# Patient Record
Sex: MALE | Race: WHITE | NOT HISPANIC OR LATINO | Employment: PART TIME | URBAN - METROPOLITAN AREA
[De-identification: names, ages, dates, MRNs, and addresses within clinical notes are randomized per-mention and may not be internally consistent; named-entity substitution may affect disease eponyms.]

---

## 2017-08-14 ENCOUNTER — OFFICE VISIT (OUTPATIENT)
Dept: FAMILY MEDICINE | Facility: CLINIC | Age: 62
End: 2017-08-14

## 2017-08-14 VITALS
DIASTOLIC BLOOD PRESSURE: 90 MMHG | SYSTOLIC BLOOD PRESSURE: 130 MMHG | TEMPERATURE: 98.2 F | HEART RATE: 90 BPM | BODY MASS INDEX: 27.79 KG/M2 | OXYGEN SATURATION: 97 % | WEIGHT: 182.8 LBS

## 2017-08-14 DIAGNOSIS — H92.22 BLOOD IN LEFT EAR CANAL: Primary | ICD-10-CM

## 2017-08-14 PROCEDURE — 99213 OFFICE O/P EST LOW 20 MIN: CPT | Performed by: PHYSICIAN ASSISTANT

## 2017-08-14 NOTE — PROGRESS NOTES
CC: Left ear symptoms    History:  Lennox is here today with complaints of left ear symptoms. This has been ongoing happening once every 1-2 months for the past 1-2 years. Symptoms resolve within 2 days. Woke up this morning and felt liquid in left ear. Used q-tips to get bloody substance out. Slight pain in left ear as well. History of surgery x 2 with inner implants as recently as 2 years ago.  No tinnitus, dizziness. Hearing still normal. Has not found for sure trigger, but did notice that when it happened back in March/April he was doing a lot of lifting.     Sees Dr. Garcia at [facility]. 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    Lennox feels well otherwise. No fevers, sweats, chills.    PMH, MEDICATIONS, ALLERGIES, SOCIAL AND FAMILY HISTORY in [facility] and reviewed by me personally.    ROS negative other than the symptoms noted above in the HPI.        Examination   /90 (BP Location: Right arm, Patient Position: Chair, Cuff Size: Adult Large)  Pulse 90  Temp 98.2  F (36.8  C) (Oral)  Wt 82.9 kg (182 lb 12.8 oz)  SpO2 97%  BMI 27.79 kg/m2       Constitutional: Sitting comfortably, in no acute distress. Vital signs noted  Eyes: pupils equal round reactive to light and accomodation, extra ocular movements intact  Ears: right: external canal and TM free of abnormalities left: TM appears normal for history of surgeries- no sign of infection. Blood pooled in mid external canal. No clear skin lesion or foreign body.  Cardiovascular:  regular rate and rhythm, no murmurs, clicks, or gallops  Respiratory:  normal respiratory rate and rhythm, lungs clear to auscultation  Neuro: hearing intact bilaterally.  Psychiatric: mentation appears normal and affect normal/bright      A/P    ICD-10-CM    1. Blood in left ear canal H92.22        DISCUSSION: Discussed this case with Dr. Crawford, who also examined Lennox's ear. Given his complicated history, and no  obvious abnormality of left TM other than the pooled blood in canal, we both recommended Lennox contact his ENT, Dr. Garcia. Given the cyclical nature, this is something that should be evaluated more thoroughly in case his TM is leaking. Decided not to do a flush today to better visualize blood pooling area, as it may damage possibly fragile TM/inner ear.    Asked Lennox to contact me if his insurance requires an ENT referral to be seen by Dr. Garcia. Should be okay thought since he is already a patient there. He should keep a journal of his symptoms and any potential triggers.    follow up visit: As needed    Maria Luisa Peralta PA-C  [facility]

## 2017-08-14 NOTE — NURSING NOTE
Lennox is here for ear pain and bleeding    Pre-Visit Screening :  Immunizations : up to date  Colon Screening : is up to date  Asthma Action Test/Plan : NA  PHQ9/GAD7 :  NA  Pulse - regular  My Chart - declines    CLASSIFICATION OF OVERWEIGHT AND OBESITY BY BMI                         Obesity Class           BMI(kg/m2)  Underweight                                    < 18.5  Normal                                         18.5-24.9  Overweight                                     25.0-29.9  OBESITY                     I                  30.0-34.9                              II                 35.0-39.9  EXTREME OBESITY             III                >40                             Patient's  BMI Body mass index is 27.79 kg/(m^2).  http://hin.nhlbi.nih.gov/menuplanner/menu.cgi  Questioned patient about current smoking habits.  Pt. has never smoked.

## 2017-08-14 NOTE — MR AVS SNAPSHOT
"              After Visit Summary   2017    Lennox Acuna    MRN: [id number]           Patient Information     Date Of Birth          1955        Visit Information        Provider Department      2017 4:00 PM Maria Luisa Peralta PA-C [facility]        Today's Diagnoses     Blood in left ear canal    -  1       Follow-ups after your visit        Follow-up notes from your care team     Return if symptoms worsen or fail to improve.      Who to contact     If you have questions or need follow up information about today's clinic visit or your schedule please contact [facility] directly at [phone].  Normal or non-critical lab and imaging results will be communicated to you by MyChart, letter or phone within 4 business days after the clinic has received the results. If you do not hear from us within 7 days, please contact the clinic through MyChart or phone. If you have a critical or abnormal lab result, we will notify you by phone as soon as possible.  Submit refill requests through [employer] or call your pharmacy and they will forward the refill request to us. Please allow 3 business days for your refill to be completed.          Additional Information About Your Visit        MyChart Information     [employer] lets you send messages to your doctor, view your test results, renew your prescriptions, schedule appointments and more. To sign up, go to [email or web address] . Click on \"Log in\" on the left side of the screen, which will take you to the Welcome page. Then click on \"Sign up Now\" on the right side of the page.     You will be asked to enter the access code listed below, as well as some personal information. Please follow the directions to create your username and password.     Your access code is: [email or web address]  Expires: 2017  5:03 PM     Your access code will  in 90 days. If you need help or a new code, please call your [place] " clinic or [phone].        Care EveryWhere ID     This is your Care EveryWhere ID. This could be used by other organizations to access your [place] medical records  [id number]        Your Vitals Were     Pulse Temperature Pulse Oximetry BMI (Body Mass Index)          90 98.2  F (36.8  C) (Oral) 97% 27.79 kg/m2         Blood Pressure from Last 3 Encounters:   08/14/17 130/90   10/02/16 152/82   09/21/15 136/74    Weight from Last 3 Encounters:   08/14/17 82.9 kg (182 lb 12.8 oz)   10/02/16 86.7 kg (191 lb 2.2 oz)   09/21/15 79.8 kg (176 lb)              Today, you had the following     No orders found for display       Primary Care Provider Office Phone # Fax #    Maria Luisa Peralta PA-C [phone]       [facility] [address] [facility] [address]  [facility] [address]        Equal Access to Services     CESAR REYES : Hadii aad ku hadasho Soomaali, waaxda luqadaha, qaybta kaalmada adeegyada, waxay idiin hayaan philleg harisharamame gilliam . So [facility] [phone].    ATENCIÓN: Si habla español, tiene a gee disposición servicios gratuitos de asistencia lingüística. [facility] [phone].    We comply with applicable federal civil rights laws and [place] laws. We do not discriminate on the basis of race, color, national origin, age, disability sex, sexual orientation or gender identity.            Thank you!     Thank you for choosing [facility]  for your care. Our goal is always to provide you with excellent care. Hearing back from our patients is one way we can continue to improve our services. Please take a few minutes to complete the written survey that you may receive in the mail after your visit with us. Thank you!             Your Updated Medication List - Protect others around you: Learn how to safely use, store and throw away your medicines at www.disposemymeds.org.          This list is accurate as of: 8/14/17  5:03 PM.  Always use your most recent med  list.                   Brand Name Dispense Instructions for use Diagnosis    ACETAMINOPHEN PO      Take 500 mg by mouth once        IBUPROFEN PO      Take by mouth daily

## 2017-10-03 ENCOUNTER — OFFICE VISIT (OUTPATIENT)
Dept: FAMILY MEDICINE | Facility: CLINIC | Age: 62
End: 2017-10-03

## 2017-10-03 VITALS
HEART RATE: 73 BPM | TEMPERATURE: 98.5 F | HEIGHT: 66 IN | SYSTOLIC BLOOD PRESSURE: 138 MMHG | BODY MASS INDEX: 29.77 KG/M2 | WEIGHT: 185.2 LBS | DIASTOLIC BLOOD PRESSURE: 80 MMHG | OXYGEN SATURATION: 95 %

## 2017-10-03 DIAGNOSIS — Z01.818 PRE-OP EXAM: Primary | ICD-10-CM

## 2017-10-03 LAB
ERYTHROCYTE [DISTWIDTH] IN BLOOD BY AUTOMATED COUNT: 12.1 %
HBA1C MFR BLD: 5.8 % (ref 4–7)
HCT VFR BLD AUTO: 46 % (ref 40–53)
HEMOGLOBIN: 14.6 G/DL (ref 13.3–17.7)
MCH RBC QN AUTO: 30.1 PG (ref 26–33)
MCHC RBC AUTO-ENTMCNC: 31.7 G/DL (ref 31–36)
MCV RBC AUTO: 94.9 FL (ref 78–100)
PLATELET COUNT - QUEST: 305 10^9/L (ref 150–375)
RBC # BLD AUTO: 4.85 10*12/L (ref 4.4–5.9)
WBC # BLD AUTO: 9.4 10*9/L (ref 4–11)

## 2017-10-03 PROCEDURE — 99214 OFFICE O/P EST MOD 30 MIN: CPT | Performed by: PHYSICIAN ASSISTANT

## 2017-10-03 PROCEDURE — 85027 COMPLETE CBC AUTOMATED: CPT | Performed by: PHYSICIAN ASSISTANT

## 2017-10-03 PROCEDURE — 93000 ELECTROCARDIOGRAM COMPLETE: CPT | Performed by: PHYSICIAN ASSISTANT

## 2017-10-03 PROCEDURE — 36415 COLL VENOUS BLD VENIPUNCTURE: CPT | Performed by: PHYSICIAN ASSISTANT

## 2017-10-03 PROCEDURE — 83036 HEMOGLOBIN GLYCOSYLATED A1C: CPT | Performed by: PHYSICIAN ASSISTANT

## 2017-10-03 NOTE — PROGRESS NOTES
[facility]  [address]  [address]  [facility] [address]  [phone]  Dept: [phone]    PRE-OP EVALUATION:  Today's date: 10/3/2017    Lennox Acuna (: 1955) presents for pre-operative evaluation assessment as requested by Dr. Christianson requires evaluation and anesthesia risk assessment prior to undergoing surgery/procedure for treatment of Rotator  cuff surgery on Right side .  Proposed procedure: Rotator cuff surgery on right side    Date of Surgery/ Procedure: 10/10/17  Time of Surgery/ Procedure: [facility]/Surgical Facility: [facility]  Fax number for surgical facility: [phone]  Primary Physician: Maria Luisa Peralta  Type of Anesthesia Anticipated: to be determined    Patient has a Health Care Directive or Living Will:  NO    1. NO - Do you have a history of heart attack, stroke, stent, bypass or surgery on an artery in the head, neck, heart or legs?  2. NO - Do you ever have any pain or discomfort in your chest?  3. NO - Do you have a history of  Heart Failure?  4. NO - Are you troubled by shortness of breath when: walking on the level, up a slight hill or at night?  5. NO - Do you currently have a cold, bronchitis or other respiratory infection?  6. NO - Do you have a cough, shortness of breath or wheezing?  7. NO - Do you sometimes get pains in the calves of your legs when you walk?  8. NO - Do you or anyone in your family have previous history of blood clots?  9. NO - Do you or does anyone in your family have a serious bleeding problem such as prolonged bleeding following surgeries or cuts?  10. NO - Have you ever had problems with anemia or been told to take iron pills?  11. NO - Have you had any abnormal blood loss such as black, tarry or bloody stools, or abnormal vaginal bleeding?  12. NO - Have you ever had a blood transfusion?  13. YES - Have you or any of your relatives ever had problems with anesthesia? PATIENT  HAS HARD TIME WAKING UP AFTER ANESTHESIA  14. NO - Do you have sleep apnea, excessive snoring or daytime drowsiness?  15. NO - Do you have any prosthetic heart valves?  16. NO - Do you have prosthetic joints?  17. NO - Is there any chance that you may be pregnant?        HPI:                                                      Brief HPI related to upcoming procedure: Lennox started having symptoms in right shoulder 1 year ago. No initial injury, but thought be due repetitive lifting, or degenerative changes    See problem list for active medical problems.  Problems all longstanding and stable, except as noted/documented.  See ROS for pertinent symptoms related to these conditions.                                                                                                  .    MEDICAL HISTORY:                                                    Patient Active Problem List    Diagnosis Date Noted     Wrist sprain, right, initial encounter 08/24/2015     Priority: Medium     Health Care Home (V65.8) 07/16/2014     Priority: Medium     ACP (advance care planning) 07/16/2014     Priority: Medium     Advance Care Planning 8/24/2015: ACP Review and Resources Provided:  Reviewed chart for advance care plan.  Lennox Acuna has no plan or code status on file. Discussed available resources and provided with information. Confirmed code status reflects current choices pending further ACP discussions.  Confirmed/documented legally designated decision maker(s). Added by Erica Ta               Internal derangement of knee 07/16/2014     Priority: Medium     History of gastric ulcer 04/15/2013     Priority: Medium     Conductive hearing loss in left ear 10/07/2011     Priority: Medium     Cholesteatoma 10/07/2011     Priority: Medium      Past Medical History:   Diagnosis Date     HL (hearing loss)      Past Surgical History:   Procedure Laterality Date     COLONOSCOPY  2010    every 5 years/ DR Daugherty     HAND SURGERY   "2/2012    thumb     HERNIA REPAIR  1990's    left     LASER DIODE STAPEDOTOMY Left 1/12/2015    Procedure: LASER DIODE STAPEDOTOMY;  Surgeon: Tasha Garcia MD;  Location: [facility] OR     STRESS TEST  age 48    WNL     TONSILLECTOMY  age 8     TYMPANOMASTOIDECTOMY  2010/2011    facial nerve tumors     TYMPANOMASTOIDECTOMY WITH FACIAL MONITORING  12/19/2011    Procedure:TYMPANOMASTOIDECTOMY WITH FACIAL MONITORING; Left Revision Tympanomastoidectomy with Partial  Ossicular Chain Reconstruction, using Facial Nerve Monitoring **latex safe room; Surgeon:TASHA GARCIA; Location:[facility] OR     Current Outpatient Prescriptions   Medication Sig Dispense Refill     ACETAMINOPHEN PO Take 500 mg by mouth once       IBUPROFEN PO Take by mouth daily       OTC products: Took ibuprofen and Tylenol this morning. Surgical team told him no ASA, but unsure about ibuprofen. Will stop today unless the surgical team tells him otherwise.     Allergies   Allergen Reactions     Nkda [No Known Drug Allergies]       Latex Allergy: NO    Social History   Substance Use Topics     Smoking status: Never Smoker     Smokeless tobacco: Never Used     Alcohol use No      Comment: 2-3 drinks per week     History   Drug Use No       REVIEW OF SYSTEMS:                                                    Constitutional, neuro, ENT, endocrine, pulmonary, cardiac, gastrointestinal, genitourinary, musculoskeletal, integument and psychiatric systems are negative, except as otherwise noted.      EXAM:                                                    /80 (BP Location: Left arm, Patient Position: Chair, Cuff Size: Adult Regular)  Pulse 73  Temp 98.5  F (36.9  C) (Oral)  Ht 1.676 m (5' 6\")  Wt 84 kg (185 lb 3.2 oz)  SpO2 95%  BMI 29.89 kg/m2    GENERAL APPEARANCE: healthy, alert and no distress     EYES: EOMI,  PERRL     HENT: ear canals and TM's normal and nose and mouth without ulcers or lesions     NECK: no adenopathy, no asymmetry, masses, or scars " and thyroid normal to palpation     RESP: lungs clear to auscultation - no rales, rhonchi or wheezes     CV: regular rates and rhythm, normal S1 S2, no S3 or S4 and no murmur, click or rub     ABDOMEN:  soft, nontender, no HSM or masses and bowel sounds normal     MS: extremities normal- no gross deformities noted, no evidence of inflammation in joints, FROM in all extremities.     SKIN: no suspicious lesions or rashes     NEURO: Normal strength and tone, sensory exam grossly normal, mentation intact and speech normal     PSYCH: mentation appears normal. and affect normal/bright     LYMPHATICS: No axillary, cervical, or supraclavicular nodes    DIAGNOSTICS:                                                    EKG: appears normal, NSR, normal axis, normal intervals, no acute ST/T changes c/w ischemia, no LVH by voltage criteria, unchanged from previous tracings  Hemoglobin (indicated for history of anemia or procedure with significant blood loss such as tonsillectomy, major intraperitoneal surgery, vascular surgery, major spine surgery, total joint replacement)      Office Visit on 10/03/2017   Component Date Value Ref Range Status     WBC 10/03/2017 9.4  4.0 - 11 10*9/L Final     RBC Count 10/03/2017 4.85  4.4 - 5.9 10*12/L Final     Hemoglobin 10/03/2017 14.6  13.3 - 17.7 g/dL Final     Hematocrit 10/03/2017 46.0  40.0 - 53.0 % Final     MCV 10/03/2017 94.9  78 - 100 fL Final     MCH 10/03/2017 30.1  26 - 33 pg Final     MCHC 10/03/2017 31.7  31 - 36 g/dL Final     RDW 10/03/2017 12.1  % Final     Platelet Count 10/03/2017 305  150 - 375 10^9/L Final     Hemoglobin A1C 10/03/2017 5.8  4.0 - 7.0 % Final     No concerning findings on labs. BMP/CMP/INR not indicated based on problem list or medications, and no paperwork supplied from surgical team requiring.    IMPRESSION:                                                    Reason for surgery/procedure: Right rotator cuff injury   Diagnosis/reason for consult:  Pre-operative    The proposed surgical procedure is considered INTERMEDIATE risk.    REVISED CARDIAC RISK INDEX  The patient has the following serious cardiovascular risks for perioperative complications such as (MI, PE, VFib and 3  AV Block):  No serious cardiac risks  INTERPRETATION: 1 risks: Class II (low risk - 0.9% complication rate)    The patient has the following additional risks for perioperative complications:  No identified additional risks      ICD-10-CM    1. Pre-op exam Z01.818 VENOUS COLLECTION     HEMOGRAM/PLATELET (BFP)     Hemoglobin A1c (BFP)     EKG 12-lead complete w/read - Clinics       RECOMMENDATIONS:                                                      --Patient is to take all scheduled medications on the day of surgery EXCEPT for modifications listed below.    APPROVAL GIVEN to proceed with proposed procedure, without further diagnostic evaluation    1. Seven days before surgery do not take Aspirin or any over-the-counter pain medications other than Tylenol.  TYLENOL is the safest pain pill to use before surgery because it does not affect your bleeding time. If tylenol is not sufficient for pain control talk to me or the surgeon and we will decide what is safe to use.    2. Do not eat anything after midnight  (hubert of the surgery) and nothing the morning of the surgery.    3. Medications: Does not take any prescription medications. Will take exclusively Tylenol (stop ibuprofen) until day prior to surgery and hold on day of surgery.     4. Follow all instructions given by the surgery team. They usually give out a packet. Read it and please follow it precisely. This helps surgical experience and outcomes.    5. If you have any questions do not hesitate to call me or the surgeon/surgical team.      Maria Luisa Peralta PA-C  [facility]           Signed Electronically by: Maria Luisa Peralta PA-C    Copy of this evaluation report is provided to requesting physician.    Eric  Preop Guidelines

## 2017-10-03 NOTE — MR AVS SNAPSHOT
"              After Visit Summary   10/3/2017    Lennox Acuna    MRN: [id number]           Patient Information     Date Of Birth          1955        Visit Information        Provider Department      10/3/2017 5:00 PM Maria Luisa Peralta PA-C [facility]        Today's Diagnoses     Pre-op exam    -  1       Follow-ups after your visit        Follow-up notes from your care team     Return if symptoms worsen or fail to improve.      Who to contact     If you have questions or need follow up information about today's clinic visit or your schedule please contact [facility] directly at [phone].  Normal or non-critical lab and imaging results will be communicated to you by MyChart, letter or phone within 4 business days after the clinic has received the results. If you do not hear from us within 7 days, please contact the clinic through MyChart or phone. If you have a critical or abnormal lab result, we will notify you by phone as soon as possible.  Submit refill requests through [employer] or call your pharmacy and they will forward the refill request to us. Please allow 3 business days for your refill to be completed.          Additional Information About Your Visit        MyChart Information     [employer] lets you send messages to your doctor, view your test results, renew your prescriptions, schedule appointments and more. To sign up, go to [email or web address] . Click on \"Log in\" on the left side of the screen, which will take you to the Welcome page. Then click on \"Sign up Now\" on the right side of the page.     You will be asked to enter the access code listed below, as well as some personal information. Please follow the directions to create your username and password.     Your access code is: [email or web address]  Expires: 2017  5:03 PM     Your access code will  in 90 days. If you need help or a new code, please call your [place] clinic or " "[phone].        Care EveryWhere ID     This is your Care EveryWhere ID. This could be used by other organizations to access your [place] medical records  [id number]        Your Vitals Were     Pulse Temperature Height Pulse Oximetry BMI (Body Mass Index)       73 98.5  F (36.9  C) (Oral) 1.676 m (5' 6\") 95% 29.89 kg/m2        Blood Pressure from Last 3 Encounters:   10/03/17 138/80   08/14/17 130/90   10/02/16 152/82    Weight from Last 3 Encounters:   10/03/17 84 kg (185 lb 3.2 oz)   08/14/17 82.9 kg (182 lb 12.8 oz)   10/02/16 86.7 kg (191 lb 2.2 oz)              We Performed the Following     EKG 12-lead complete w/read - Clinics     Hemoglobin A1c (BFP)     HEMOGRAM/PLATELET (BFP)     VENOUS COLLECTION        Primary Care Provider Office Phone # Fax #    Maria Luisa Erika Peralta PA-C [phone]       [facility] [address]  [facility] [address]        Equal Access to Services     [facility] : Hadii aad ku hadasho Soomaali, waaxda luqadaha, qaybta kaalmada adeegyada, nitesh powers haygabinon meenu gilliam . So [facility] [phone].    ATENCIÓN: Si habla español, tiene a gee disposición servicios gratuitos de asistencia lingüística. Ll[facility] [phone].    We comply with applicable federal civil rights laws and [place] laws. We do not discriminate on the basis of race, color, national origin, age, disability, sex, sexual orientation, or gender identity.            Thank you!     Thank you for choosing [facility]  for your care. Our goal is always to provide you with excellent care. Hearing back from our patients is one way we can continue to improve our services. Please take a few minutes to complete the written survey that you may receive in the mail after your visit with us. Thank you!             Your Updated Medication List - Protect others around you: Learn how to safely use, store and throw away your medicines at " www.disposemymeds.org.          This list is accurate as of: 10/3/17  5:45 PM.  Always use your most recent med list.                   Brand Name Dispense Instructions for use Diagnosis    ACETAMINOPHEN PO      Take 500 mg by mouth once        IBUPROFEN PO      Take by mouth daily

## 2017-10-03 NOTE — LETTER
[facility]  [address]  [address]  [facility] [address]  [phone]  Dept: [phone]    PRE-OP EVALUATION:  Today's date: 10/3/2017    Lennox Acuna (: 1955) presents for pre-operative evaluation assessment as requested by Dr. Christianson requires evaluation and anesthesia risk assessment prior to undergoing surgery/procedure for treatment of Rotator  cuff surgery on Right side .  Proposed procedure: Rotator cuff surgery on right side    Date of Surgery/ Procedure: 10/10/17  Time of Surgery/ Procedure: [facility]/Surgical Facility: [facility]  Fax number for surgical facility: [phone]  Primary Physician: Maria Luisa Peralta  Type of Anesthesia Anticipated: to be determined    Patient has a Health Care Directive or Living Will:  NO    1. NO - Do you have a history of heart attack, stroke, stent, bypass or surgery on an artery in the head, neck, heart or legs?  2. NO - Do you ever have any pain or discomfort in your chest?  3. NO - Do you have a history of  Heart Failure?  4. NO - Are you troubled by shortness of breath when: walking on the level, up a slight hill or at night?  5. NO - Do you currently have a cold, bronchitis or other respiratory infection?  6. NO - Do you have a cough, shortness of breath or wheezing?  7. NO - Do you sometimes get pains in the calves of your legs when you walk?  8. NO - Do you or anyone in your family have previous history of blood clots?  9. NO - Do you or does anyone in your family have a serious bleeding problem such as prolonged bleeding following surgeries or cuts?  10. NO - Have you ever had problems with anemia or been told to take iron pills?  11. NO - Have you had any abnormal blood loss such as black, tarry or bloody stools, or abnormal vaginal bleeding?  12. NO - Have you ever had a blood transfusion?  13. YES - Have you or any of your relatives ever had problems with anesthesia? PATIENT  HAS HARD TIME WAKING UP AFTER ANESTHESIA  14. NO - Do you have sleep apnea, excessive snoring or daytime drowsiness?  15. NO - Do you have any prosthetic heart valves?  16. NO - Do you have prosthetic joints?  17. NO - Is there any chance that you may be pregnant?        HPI:                                                      Brief HPI related to upcoming procedure: Lennox started having symptoms in right shoulder 1 year ago. No initial injury, but thought be due repetitive lifting, or degenerative changes    See problem list for active medical problems.  Problems all longstanding and stable, except as noted/documented.  See ROS for pertinent symptoms related to these conditions.                                                                                                  .    MEDICAL HISTORY:                                                    Patient Active Problem List    Diagnosis Date Noted     Wrist sprain, right, initial encounter 08/24/2015     Priority: Medium     Health Care Home (V65.8) 07/16/2014     Priority: Medium     ACP (advance care planning) 07/16/2014     Priority: Medium     Advance Care Planning 8/24/2015: ACP Review and Resources Provided:  Reviewed chart for advance care plan.  Lennox Acuna has no plan or code status on file. Discussed available resources and provided with information. Confirmed code status reflects current choices pending further ACP discussions.  Confirmed/documented legally designated decision maker(s). Added by Erica Ta               Internal derangement of knee 07/16/2014     Priority: Medium     History of gastric ulcer 04/15/2013     Priority: Medium     Conductive hearing loss in left ear 10/07/2011     Priority: Medium     Cholesteatoma 10/07/2011     Priority: Medium      Past Medical History:   Diagnosis Date     HL (hearing loss)      Past Surgical History:   Procedure Laterality Date     COLONOSCOPY  2010    every 5 years/ DR Daugherty     HAND SURGERY   "2/2012    thumb     HERNIA REPAIR  1990's    left     LASER DIODE STAPEDOTOMY Left 1/12/2015    Procedure: LASER DIODE STAPEDOTOMY;  Surgeon: Tasha Garcia MD;  Location: [facility] OR     STRESS TEST  age 48    WNL     TONSILLECTOMY  age 8     TYMPANOMASTOIDECTOMY  2010/2011    facial nerve tumors     TYMPANOMASTOIDECTOMY WITH FACIAL MONITORING  12/19/2011    Procedure:TYMPANOMASTOIDECTOMY WITH FACIAL MONITORING; Left Revision Tympanomastoidectomy with Partial  Ossicular Chain Reconstruction, using Facial Nerve Monitoring **latex safe room; Surgeon:TASHA GARCIA; Location:[facility] OR     Current Outpatient Prescriptions   Medication Sig Dispense Refill     ACETAMINOPHEN PO Take 500 mg by mouth once       IBUPROFEN PO Take by mouth daily       OTC products: Took ibuprofen and Tylenol this morning. Surgical team told him no ASA, but unsure about ibuprofen. Will stop today unless the surgical team tells him otherwise.     Allergies   Allergen Reactions     Nkda [No Known Drug Allergies]       Latex Allergy: NO    Social History   Substance Use Topics     Smoking status: Never Smoker     Smokeless tobacco: Never Used     Alcohol use No      Comment: 2-3 drinks per week     History   Drug Use No       REVIEW OF SYSTEMS:                                                    Constitutional, neuro, ENT, endocrine, pulmonary, cardiac, gastrointestinal, genitourinary, musculoskeletal, integument and psychiatric systems are negative, except as otherwise noted.      EXAM:                                                    /80 (BP Location: Left arm, Patient Position: Chair, Cuff Size: Adult Regular)  Pulse 73  Temp 98.5  F (36.9  C) (Oral)  Ht 1.676 m (5' 6\")  Wt 84 kg (185 lb 3.2 oz)  SpO2 95%  BMI 29.89 kg/m2    GENERAL APPEARANCE: healthy, alert and no distress     EYES: EOMI,  PERRL     HENT: ear canals and TM's normal and nose and mouth without ulcers or lesions     NECK: no adenopathy, no asymmetry, masses, or scars " and thyroid normal to palpation     RESP: lungs clear to auscultation - no rales, rhonchi or wheezes     CV: regular rates and rhythm, normal S1 S2, no S3 or S4 and no murmur, click or rub     ABDOMEN:  soft, nontender, no HSM or masses and bowel sounds normal     MS: extremities normal- no gross deformities noted, no evidence of inflammation in joints, FROM in all extremities.     SKIN: no suspicious lesions or rashes     NEURO: Normal strength and tone, sensory exam grossly normal, mentation intact and speech normal     PSYCH: mentation appears normal. and affect normal/bright     LYMPHATICS: No axillary, cervical, or supraclavicular nodes    DIAGNOSTICS:                                                    EKG: appears normal, NSR, normal axis, normal intervals, no acute ST/T changes c/w ischemia, no LVH by voltage criteria, unchanged from previous tracings  Hemoglobin (indicated for history of anemia or procedure with significant blood loss such as tonsillectomy, major intraperitoneal surgery, vascular surgery, major spine surgery, total joint replacement)      Office Visit on 10/03/2017   Component Date Value Ref Range Status     WBC 10/03/2017 9.4  4.0 - 11 10*9/L Final     RBC Count 10/03/2017 4.85  4.4 - 5.9 10*12/L Final     Hemoglobin 10/03/2017 14.6  13.3 - 17.7 g/dL Final     Hematocrit 10/03/2017 46.0  40.0 - 53.0 % Final     MCV 10/03/2017 94.9  78 - 100 fL Final     MCH 10/03/2017 30.1  26 - 33 pg Final     MCHC 10/03/2017 31.7  31 - 36 g/dL Final     RDW 10/03/2017 12.1  % Final     Platelet Count 10/03/2017 305  150 - 375 10^9/L Final     Hemoglobin A1C 10/03/2017 5.8  4.0 - 7.0 % Final     No concerning findings on labs. BMP/CMP/INR not indicated based on problem list or medications, and no paperwork supplied from surgical team requiring.    IMPRESSION:                                                    Reason for surgery/procedure: Right rotator cuff injury   Diagnosis/reason for consult:  Pre-operative    The proposed surgical procedure is considered INTERMEDIATE risk.    REVISED CARDIAC RISK INDEX  The patient has the following serious cardiovascular risks for perioperative complications such as (MI, PE, VFib and 3  AV Block):  No serious cardiac risks  INTERPRETATION: 1 risks: Class II (low risk - 0.9% complication rate)    The patient has the following additional risks for perioperative complications:  No identified additional risks      ICD-10-CM    1. Pre-op exam Z01.818 VENOUS COLLECTION     HEMOGRAM/PLATELET (BFP)     Hemoglobin A1c (BFP)     EKG 12-lead complete w/read - Clinics       RECOMMENDATIONS:                                                      --Patient is to take all scheduled medications on the day of surgery EXCEPT for modifications listed below.    APPROVAL GIVEN to proceed with proposed procedure, without further diagnostic evaluation    1. Seven days before surgery do not take Aspirin or any over-the-counter pain medications other than Tylenol.  TYLENOL is the safest pain pill to use before surgery because it does not affect your bleeding time. If tylenol is not sufficient for pain control talk to me or the surgeon and we will decide what is safe to use.    2. Do not eat anything after midnight  (hubert of the surgery) and nothing the morning of the surgery.    3. Medications: Does not take any prescription medications. Will take exclusively Tylenol (stop ibuprofen) until day prior to surgery and hold on day of surgery.     4. Follow all instructions given by the surgery team. They usually give out a packet. Read it and please follow it precisely. This helps surgical experience and outcomes.    5. If you have any questions do not hesitate to call me or the surgeon/surgical team.      Maria Luisa Peralta PA-C  [facility]           Signed Electronically by: Maria Luisa Peralta PA-C    Copy of this evaluation report is provided to requesting physician.    Eric  Preop Guidelines

## 2017-10-17 ENCOUNTER — TRANSFERRED RECORDS (OUTPATIENT)
Dept: FAMILY MEDICINE | Facility: CLINIC | Age: 62
End: 2017-10-17

## 2017-11-30 ENCOUNTER — TRANSFERRED RECORDS (OUTPATIENT)
Dept: FAMILY MEDICINE | Facility: CLINIC | Age: 62
End: 2017-11-30

## 2018-01-08 ENCOUNTER — TRANSFERRED RECORDS (OUTPATIENT)
Dept: FAMILY MEDICINE | Facility: CLINIC | Age: 63
End: 2018-01-08

## 2018-01-11 ENCOUNTER — TRANSFERRED RECORDS (OUTPATIENT)
Dept: FAMILY MEDICINE | Facility: CLINIC | Age: 63
End: 2018-01-11

## 2018-02-16 ENCOUNTER — OFFICE VISIT (OUTPATIENT)
Dept: FAMILY MEDICINE | Facility: CLINIC | Age: 63
End: 2018-02-16

## 2018-02-16 VITALS
WEIGHT: 190.8 LBS | TEMPERATURE: 98.3 F | HEART RATE: 92 BPM | HEIGHT: 67 IN | SYSTOLIC BLOOD PRESSURE: 152 MMHG | BODY MASS INDEX: 29.95 KG/M2 | DIASTOLIC BLOOD PRESSURE: 94 MMHG | RESPIRATION RATE: 20 BRPM

## 2018-02-16 DIAGNOSIS — K59.00 CONSTIPATION, UNSPECIFIED CONSTIPATION TYPE: Primary | ICD-10-CM

## 2018-02-16 DIAGNOSIS — R10.32 ABDOMINAL PAIN, LEFT LOWER QUADRANT: ICD-10-CM

## 2018-02-16 PROCEDURE — 74018 RADEX ABDOMEN 1 VIEW: CPT | Performed by: PHYSICIAN ASSISTANT

## 2018-02-16 PROCEDURE — 99213 OFFICE O/P EST LOW 20 MIN: CPT | Performed by: PHYSICIAN ASSISTANT

## 2018-02-16 NOTE — PROGRESS NOTES
"CC: Constipated    History:  6 days ago, Lennox had a normal soft BM. Since that time, has had intermittent small firm stool, and 1 episode of larger amount of stool after suppository. Stool is brown or yellow. No blood. Abdominal pain started shortly after constipation was noted. Pain is across lower abdomen, and up to LUQ.  No epigastric, RUQ pain. Abdominal pain has been consistent since that time, but does improve somewhat when able to pass gas, urinate, or defacate.     Appetite has very low, and he feels somewhat weak/tired. Has not eaten much in the past 2-3 days. Has been getting some fluids. Has been belching. Is getting some nausea. Has tried dulcolax, suppositories, senakot, enema. May have thrush as well as he had malodorous breath and dry mouth.     He had colonoscopy in 2011 recommending a 10 year follow up.     Has been under a lot of stress lately. Both him and wife are currently unemployed. He is recovering from rotator cuff surgery, and she needs TKR that has to this point been denied.     PMH, MEDICATIONS, ALLERGIES, SOCIAL AND FAMILY HISTORY in [facility] and reviewed by me personally.      ROS negative other than the symptoms noted above in the HPI.        Examination   BP (!) 152/94 (BP Location: Left arm, Patient Position: Chair, Cuff Size: Adult Regular)  Pulse 92  Temp 98.3  F (36.8  C) (Oral)  Resp 20  Ht 1.689 m (5' 6.5\")  Wt 86.5 kg (190 lb 12.8 oz)  BMI 30.33 kg/m2       Constitutional: Sitting comfortably, in no acute distress. Vital signs noted. BP elevated  Mouth and throat: without erythema or lesions of the mucosa  Neck:  no adenopathy, trachea midline and normal to palpation  Cardiovascular:  regular rate and rhythm, no murmurs, clicks, or gallops  Respiratory:  normal respiratory rate and rhythm, lungs clear to auscultation  Abdomen: Abdomen soft, but tender to palpation over left abdomen, left pelvis. BS normal. No masses, organomegaly  : Testicular exam without abnormalities. No " hernias.   SKIN: No jaundice/pallor/rash.   Psychiatric: mentation appears normal and affect normal/bright        A/P    ICD-10-CM    1. Constipation, unspecified constipation type K59.00 XR Abdomen 1 View       DISCUSSION: Abdominal x-ray today shows moderate amount of stool, but no acute findings- confirmed by radiology report. I suspect this constipation is a stress reaction, similar to IBS. Recommended trial of Milk of Magnesium, and if unsuccessful after 24 hours, doing a course of magnesium citrate. Following improvement, he should try having small dose of Miralax daily to prevent another flare of this. Should try to a clear fluid, low residue diet until this is resolved, but once back to general diet should try to increase fiber intake and fluids.     If symptoms worsen like abdominal pain, fever, sweats,chills, weakness, should proceed to ER for disimpaction.     follow up visit: As needed    Maria Luisa Peralta PA-C  [facility]

## 2018-02-16 NOTE — MR AVS SNAPSHOT
"              After Visit Summary   2018    Lennox Acuna    MRN: [id number]           Patient Information     Date Of Birth          1955        Visit Information        Provider Department      2018 10:30 AM Maria Luisa Peralta PA-C [facility]        Today's Diagnoses     Constipation, unspecified constipation type    -  1    Abdominal pain, left lower quadrant           Follow-ups after your visit        Follow-up notes from your care team     Return if symptoms worsen or fail to improve.      Who to contact     If you have questions or need follow up information about today's clinic visit or your schedule please contact [facility] directly at [phone].  Normal or non-critical lab and imaging results will be communicated to you by MyChart, letter or phone within 4 business days after the clinic has received the results. If you do not hear from us within 7 days, please contact the clinic through [employer] or phone. If you have a critical or abnormal lab result, we will notify you by phone as soon as possible.  Submit refill requests through [employer] or call your pharmacy and they will forward the refill request to us. Please allow 3 business days for your refill to be completed.          Additional Information About Your Visit        MyChart Information     [employer] lets you send messages to your doctor, view your test results, renew your prescriptions, schedule appointments and more. To sign up, go to [email or web address] . Click on \"Log in\" on the left side of the screen, which will take you to the Welcome page. Then click on \"Sign up Now\" on the right side of the page.     You will be asked to enter the access code listed below, as well as some personal information. Please follow the directions to create your username and password.     Your access code is: [email or web address]  Expires: 2018  1:02 PM     Your access code will  in 90 " "days. If you need help or a new code, please call your [facility] clinic or [phone].        Care EveryWhere ID     This is your Care EveryWhere ID. This could be used by other organizations to access your [facility] medical records  [id number]        Your Vitals Were     Pulse Temperature Respirations Height BMI (Body Mass Index)       92 98.3  F (36.8  C) (Oral) 20 1.689 m (5' 6.5\") 30.33 kg/m2        Blood Pressure from Last 3 Encounters:   02/16/18 (!) 152/94   10/03/17 138/80   08/14/17 130/90    Weight from Last 3 Encounters:   02/16/18 86.5 kg (190 lb 12.8 oz)   10/03/17 84 kg (185 lb 3.2 oz)   08/14/17 82.9 kg (182 lb 12.8 oz)              We Performed the Following     XR Abdomen 1 View        Primary Care Provider Office Phone # Fax #    Maria Luisa Erika Peralta PA-C [phone]       [address] [place] [id number]        Equal Access to Services     [facility]: Hadii aad ku hadasho Soomaali, waaxda luqadaha, qaybta kaalmada adeegyada, nitesh gilliam . So [facility] [phone].    ATENCIÓN: Si habla español, tiene a gee disposición servicios gratuitos de asistencia lingüística. [facility] [phone].    We comply with applicable federal civil rights laws and [place] laws. We do not discriminate on the basis of race, color, national origin, age, disability, sex, sexual orientation, or gender identity.            Thank you!     Thank you for choosing [facility]  for your care. Our goal is always to provide you with excellent care. Hearing back from our patients is one way we can continue to improve our services. Please take a few minutes to complete the written survey that you may receive in the mail after your visit with us. Thank you!             Your Updated Medication List - Protect others around you: Learn how to safely use, store and throw away your medicines at www.[employer].org.          This list is accurate as of " 2/16/18  1:02 PM.  Always use your most recent med list.                   Brand Name Dispense Instructions for use Diagnosis    ACETAMINOPHEN PO      Take 500 mg by mouth once        IBUPROFEN PO      Take by mouth daily

## 2018-02-16 NOTE — NURSING NOTE
Lennox Acuna is here for constipation since Sunday. Has tried oral OTC and suppositories without help. Has pain, nausea and chills as well.    Questioned patient about current smoking habits.  Pt. has never smoked.  PULSE regular  My Chart:   CLASSIFICATION OF OVERWEIGHT AND OBESITY BY BMI                        Obesity Class           BMI(kg/m2)  Underweight                                    < 18.5  Normal                                         18.5-24.9  Overweight                                     25.0-29.9  OBESITY                     I                  30.0-34.9                             II                 35.0-39.9  EXTREME OBESITY             III                >40                            Patient's  BMI Body mass index is 30.33 kg/(m^2).  http://hin.nhlbi.nih.gov/menuplanner/menu.cgi  Pre-visit planning  Immunizations - up to date  Colonoscopy - is up to date  Mammogram -   Asthma -   PHQ9 -    LIZZY-7 -

## 2018-02-19 ENCOUNTER — TRANSFERRED RECORDS (OUTPATIENT)
Dept: FAMILY MEDICINE | Facility: CLINIC | Age: 63
End: 2018-02-19

## 2018-02-21 ENCOUNTER — OFFICE VISIT (OUTPATIENT)
Dept: FAMILY MEDICINE | Facility: CLINIC | Age: 63
End: 2018-02-21

## 2018-02-21 VITALS
BODY MASS INDEX: 29.79 KG/M2 | HEIGHT: 67 IN | OXYGEN SATURATION: 97 % | TEMPERATURE: 97.9 F | RESPIRATION RATE: 12 BRPM | SYSTOLIC BLOOD PRESSURE: 132 MMHG | WEIGHT: 189.8 LBS | DIASTOLIC BLOOD PRESSURE: 94 MMHG | HEART RATE: 82 BPM

## 2018-02-21 DIAGNOSIS — N45.1 EPIDIDYMITIS, LEFT: Primary | ICD-10-CM

## 2018-02-21 LAB
ALBUMIN (URINE): >=300 MG/DL
APPEARANCE UR: ABNORMAL
BACTERIA, UR: ABNORMAL
BILIRUB UR QL: ABNORMAL
CASTS/LPF: ABNORMAL
COLOR UR: YELLOW
EP/HPF: ABNORMAL
GLUCOSE URINE: ABNORMAL MG/DL
HGB UR QL: ABNORMAL
KETONES UR QL: ABNORMAL MG/DL
LEUKOCYTE ESTERASE - QUEST: ABNORMAL
MISC.: ABNORMAL
NITRITE UR QL STRIP: ABNORMAL
PH UR STRIP: 5.5 PH (ref 5–7)
RBC, UR MICRO: ABNORMAL (ref ?–2)
SP. GRAVITY: >=1.03
UROBILINOGEN UR QL STRIP: 0.2 EU/DL (ref 0.2–1)
WBC, UR MICRO: ABNORMAL (ref ?–2)

## 2018-02-21 PROCEDURE — 87086 URINE CULTURE/COLONY COUNT: CPT | Mod: 90 | Performed by: FAMILY MEDICINE

## 2018-02-21 PROCEDURE — 81001 URINALYSIS AUTO W/SCOPE: CPT | Performed by: FAMILY MEDICINE

## 2018-02-21 PROCEDURE — 99213 OFFICE O/P EST LOW 20 MIN: CPT | Performed by: FAMILY MEDICINE

## 2018-02-21 RX ORDER — DOXYCYCLINE 100 MG/1
100 TABLET ORAL 2 TIMES DAILY
Qty: 28 TABLET | Refills: 0 | Status: ON HOLD | OUTPATIENT
Start: 2018-02-21 | End: 2018-02-25

## 2018-02-21 NOTE — NURSING NOTE
Lennox is here because pt states he has had groin pain that has become worse since his last visit and cant sleep because of discomfort, no appetite. He was seen Friday with constipation issues as well but pt states that has cleared up some    Pre-Visit Screening :  Immunizations : up to date  Colon Screening : is up to date  Asthma Action Test/Plan : rigoberto  PHQ9/GAD7 :  Na    Pulse - regular  My Chart - declines    CLASSIFICATION OF OVERWEIGHT AND OBESITY BY BMI                         Obesity Class           BMI(kg/m2)  Underweight                                    < 18.5  Normal                                         18.5-24.9  Overweight                                     25.0-29.9  OBESITY                     I                  30.0-34.9                              II                 35.0-39.9  EXTREME OBESITY             III                >40                             Patient's  BMI Body mass index is 22.15 kg/(m^2).  http://hin.nhlbi.nih.gov/menuplanner/menu.cgi  Questioned patient about current smoking habits.  Pt. has never smoked.  The patient has verbalized that it is ok to leave a detailed voice message on the patient's home voicemail with results/recommendations from this visit.       Verified [phone] (and can give wife information Nicole) phone number:

## 2018-02-21 NOTE — PROGRESS NOTES
SUBJECTIVE: 62 year old male complaining of left scrotal and groin pain for 3 day(s). Last night kept him awake  The patient describes last week had 6 days of no BM resolved with magnesium citrate/ reviewed last visit. Normal stool noted without melena  The patient denies a history of fever, bowel changes or urinary symptoms.   Smoking history: No.   Relevant past medical history: positive for hearing loss.    OBJECTIVE: The patient appears healthy, alert, no distress, cooperative, smiling and over weight.   CHEST: Regular rate and  rhythm. S1 and S2 normal, no murmurs, clicks, gallops or rubs. No edema or JVD. Chest is clear; no wheezes or rales.  ABD: The abdomen is soft without tenderness, guarding, mass or organomegaly. Bowel sounds are normal. No CVA tenderness or inguinal adenopathy noted.  GENITALS: Penis is normal with no skin lesions. Glans penis is normal as is urethral meatus in its position and size of opening. Epididymis on both sides is normal but exquisitely tender on the left and testicular volume and consistency seem normal. There is no evidence of inguinal hernia.      UA:negative    ASSESSMENT:(N45.1) Epididymitis, left  (primary encounter diagnosis)  Comment: read handout  Plan: HCL  Urinalysis, Routine (BFP), Urine Culture         Aerobic Bacterial, doxycycline Monohydrate 100         MG TABS        Potential medication side effects were discussed with the patient; let me know if any occur.  Monitor.

## 2018-02-21 NOTE — MR AVS SNAPSHOT
"              After Visit Summary   2/21/2018    Lennox Acuna    MRN: [id number]           Patient Information     Date Of Birth          1955        Visit Information        Provider Department      2/21/2018 12:15 PM Tracy Hilliard MD [facility]        Today's Diagnoses     Epididymitis, left    -  1      Care Instructions    Read handout    Epididymitis, left  (primary encounter diagnosis)  Comment: read handout  Plan: HCL  Urinalysis, Routine (BFP), Urine Culture         Aerobic Bacterial, doxycycline Monohydrate 100         MG TABS        Potential medication side effects were discussed with the patient; let me know if any occur.  Monitor.            Follow-ups after your visit        Who to contact     If you have questions or need follow up information about today's clinic visit or your schedule please contact [facility] directly at [phone].  Normal or non-critical lab and imaging results will be communicated to you by [employer], letter or phone within 4 business days after the clinic has received the results. If you do not hear from us within 7 days, please contact the clinic through [employer] or phone. If you have a critical or abnormal lab result, we will notify you by phone as soon as possible.  Submit refill requests through [employer] or call your pharmacy and they will forward the refill request to us. Please allow 3 business days for your refill to be completed.          Additional Information About Your Visit        MyChart Information     [employer] lets you send messages to your doctor, view your test results, renew your prescriptions, schedule appointments and more. To sign up, go to [email or web address] . Click on \"Log in\" on the left side of the screen, which will take you to the Welcome page. Then click on \"Sign up Now\" on the right side of the page.     You will be asked to enter the access code listed below, as well as some personal " "information. Please follow the directions to create your username and password.     Your access code is: [email or web address]  Expires: 2018  1:02 PM     Your access code will  in 90 days. If you need help or a new code, please call your [place] clinic or [phone].        Care EveryWhere ID     This is your Care EveryWhere ID. This could be used by other organizations to access your [place] medical records  [id number]        Your Vitals Were     Pulse Temperature Respirations Height Pulse Oximetry BMI (Body Mass Index)    82 97.9  F (36.6  C) (Oral) 12 1.695 m (5' 6.75\") 97% 29.95 kg/m2       Blood Pressure from Last 3 Encounters:   18 (!) 132/94   18 (!) 152/94   10/03/17 138/80    Weight from Last 3 Encounters:   18 86.1 kg (189 lb 12.8 oz)   18 86.5 kg (190 lb 12.8 oz)   10/03/17 84 kg (185 lb 3.2 oz)              We Performed the Following     HCL  Urinalysis, Routine (BFP)     Urine Culture Aerobic Bacterial          Today's Medication Changes          These changes are accurate as of 18 11:59 PM.  If you have any questions, ask your nurse or doctor.               Start taking these medicines.        Dose/Directions    doxycycline Monohydrate 100 MG Tabs   Used for:  Epididymitis, left   Started by:  Tracy Hilliard MD        Dose:  100 mg   Take 100 mg by mouth 2 times daily for 14 days   Quantity:  28 tablet   Refills:  0            Where to get your medicines      These medications were sent to [facility] [place], [place] - [address] AT [place] [address]  [address], [facility] [address]     Phone:  [phone]     doxycycline Monohydrate 100 MG Tabs                Primary Care Provider Office Phone # Fax #    Maria Luisa Peralta PA-C [phone] [address] [facility] [address]  [facility] [address]        Equal Access to Services     CESAR REYES AH: Glenna Hamilton jess pabon, [facility] " nitesh rosariosuzan henry. So [facility] [phone].    ATENCIÓN: Si yon lee, tiene a gee disposición servicios gratuitos de asistencia lingüística. Llame al [phone].    We comply with applicable federal civil rights laws and [place] laws. We do not discriminate on the basis of race, color, national origin, age, disability, sex, sexual orientation, or gender identity.            Thank you!     Thank you for choosing [facility]  for your care. Our goal is always to provide you with excellent care. Hearing back from our patients is one way we can continue to improve our services. Please take a few minutes to complete the written survey that you may receive in the mail after your visit with us. Thank you!             Your Updated Medication List - Protect others around you: Learn how to safely use, store and throw away your medicines at www.disposemymeds.org.          This list is accurate as of 2/21/18 11:59 PM.  Always use your most recent med list.                   Brand Name Dispense Instructions for use Diagnosis    ACETAMINOPHEN PO      Take 500 mg by mouth once        doxycycline Monohydrate 100 MG Tabs     28 tablet    Take 100 mg by mouth 2 times daily for 14 days    Epididymitis, left       IBUPROFEN PO      Take by mouth daily

## 2018-02-21 NOTE — PATIENT INSTRUCTIONS
Read handout    Epididymitis, left  (primary encounter diagnosis)  Comment: read handout  Plan: HCL  Urinalysis, Routine (BFP), Urine Culture         Aerobic Bacterial, doxycycline Monohydrate 100         MG TABS        Potential medication side effects were discussed with the patient; let me know if any occur.  Monitor.

## 2018-02-23 ENCOUNTER — TELEPHONE (OUTPATIENT)
Dept: FAMILY MEDICINE | Facility: CLINIC | Age: 63
End: 2018-02-23

## 2018-02-23 LAB — URINE VOIDED CULTURE: NORMAL

## 2018-02-24 ENCOUNTER — HOSPITAL ENCOUNTER (INPATIENT)
Facility: CLINIC | Age: 63
LOS: 3 days | Discharge: HOME | DRG: 392 | End: 2018-02-28
Attending: EMERGENCY MEDICINE | Admitting: INTERNAL MEDICINE
Payer: COMMERCIAL

## 2018-02-24 DIAGNOSIS — K57.92 ACUTE DIVERTICULITIS: ICD-10-CM

## 2018-02-24 PROCEDURE — 96375 TX/PRO/DX INJ NEW DRUG ADDON: CPT

## 2018-02-24 PROCEDURE — 99285 EMERGENCY DEPT VISIT HI MDM: CPT | Mod: 25

## 2018-02-24 PROCEDURE — 83690 ASSAY OF LIPASE: CPT | Performed by: EMERGENCY MEDICINE

## 2018-02-24 PROCEDURE — 99223 1ST HOSP IP/OBS HIGH 75: CPT | Mod: AI | Performed by: INTERNAL MEDICINE

## 2018-02-24 PROCEDURE — 83605 ASSAY OF LACTIC ACID: CPT | Performed by: EMERGENCY MEDICINE

## 2018-02-24 PROCEDURE — 85025 COMPLETE CBC W/AUTO DIFF WBC: CPT | Performed by: EMERGENCY MEDICINE

## 2018-02-24 PROCEDURE — 25000128 H RX IP 250 OP 636: Performed by: EMERGENCY MEDICINE

## 2018-02-24 PROCEDURE — 96376 TX/PRO/DX INJ SAME DRUG ADON: CPT

## 2018-02-24 PROCEDURE — 80053 COMPREHEN METABOLIC PANEL: CPT | Performed by: EMERGENCY MEDICINE

## 2018-02-24 RX ORDER — ONDANSETRON 2 MG/ML
4 INJECTION INTRAMUSCULAR; INTRAVENOUS ONCE
Status: COMPLETED | OUTPATIENT
Start: 2018-02-24 | End: 2018-02-24

## 2018-02-24 RX ORDER — HYDROMORPHONE HYDROCHLORIDE 1 MG/ML
0.5 INJECTION, SOLUTION INTRAMUSCULAR; INTRAVENOUS; SUBCUTANEOUS
Status: DISCONTINUED | OUTPATIENT
Start: 2018-02-24 | End: 2018-02-25

## 2018-02-24 RX ADMIN — Medication 0.5 MG: at 23:52

## 2018-02-24 RX ADMIN — ONDANSETRON 4 MG: 2 INJECTION INTRAMUSCULAR; INTRAVENOUS at 23:51

## 2018-02-24 ASSESSMENT — ENCOUNTER SYMPTOMS
CONSTIPATION: 0
DYSURIA: 0
DIARRHEA: 0
VOMITING: 0
ABDOMINAL PAIN: 1
NAUSEA: 1

## 2018-02-24 NOTE — IP AVS SNAPSHOT
MRN:[id number]                      After Visit Summary   2/24/2018    Lennox Acuna    MRN: [id number]           Thank you!     Thank you for choosing [facility] for your care. Our goal is always to provide you with excellent care. Hearing back from our patients is one way we can continue to improve our services. Please take a few minutes to complete the written survey that you may receive in the mail after you visit. If you would like to speak to someone directly about your visit please contact Patient Relations at [phone]. Thank you!          Patient Information     Date Of Birth          1955        Designated Caregiver       Most Recent Value    Caregiver    Will someone help with your care after discharge? yes    Name of designated caregiver Nicole, wife    Phone number of caregiver [phone]    Caregiver address Same as pt      About your hospital stay     You were admitted on:  February 25, 2018 You last received care in the:  [place] [address] [facility]    You were discharged on:  February 28, 2018        Reason for your hospital stay       Acute diverticultis                  Who to Call     For medical emergencies, please call 911.  For non-urgent questions about your medical care, please call your primary care provider or clinic, None          Attending Provider     Provider Specialty    Tomy Velasquez DO Emergency Medicine    Rolando Urias MD Emergency Medicine    Carlos Tee MD Internal Medicine       Primary Care Provider Fax #    [facility] [phone]      After Care Instructions     Activity       Your activity upon discharge: activity as tolerated            Diet       Follow this diet upon discharge: Orders Placed This Encounter      Low Fiber Diet            Discharge Instructions       Please call the clinic if:  - fever greater than 101 degrees  - any signs of infection (increasing redness,  swelling, tenderness, warmth, change in appearance, increased drainage)  - blood in urine or stool  - severe pain that is not relieved by medicine, rest, or ice    Or as questions or concerns arise. Contact clinic at [phone]    Call 911 if you feel you are having a medical emergency                  Follow-up Appointments     Follow-up and recommended labs and tests        Follow up with primary care provider, [facility], within 7 days   Follow up with colorectal surgery as scheduled            Follow-up and recommended labs and tests        [facility] will arrange for CT sinogram in 1-2 weeks as well as colonoscopy in 6-8 weeks.   If you have further questions regarding scheduling, please call [phone].                  Further instructions from your care team         Discharge Instructions: Caring for Your Hitesh-Bennett Drainage Tube  Your doctor discharges you with a Hitesh-Bennett drainage tube. Doctors commonly leave this drain within the abdominal cavity after surgery. It helps drain and collect blood and body fluid after surgery. This can prevent swelling and reduces the risk for infection. The tube is held in place by a few stitches. It is covered with a bandage. Your doctor will remove the drain when he or she determines you no longer need it.  Home care    Don t sleep on the same side as the tube.    Secure the tube and bag inside your clothing with a safety pin. This helps keep the tube from being pulled out.    Empty your drain at least twice a day. Empty it more often if the drain is full. Wash  and dry your hands before emptying the drain.    Lift the opening on the drain.    Drain the fluid into a measuring cup.    Record the amount of fluid each time you empty the drain. Include the date and time it was emptied. Share this information with your doctor on your next visit.    Squeeze the bulb with your hands until you hear air coming out of the  bulb if your doctor has instructed you to do so (sometimes the bulb is used as a reservoir without suction). Check with your doctor about specific drain instructions.    Close the opening.    Change the dressing around the tube every day.    Wash your hands.    Remove the old bandage.    Wash your hands again.    Wet a cotton swab and clean the skin around the incision and tube site. Use normal saline solution (salt and water). Or, you can use warm, soapy water.    Put a new bandage on the incision and tube site. Make the bandage large enough to cover the whole incision area.    Tape the bandage in place.    Keep the bandage and tube site dry when you shower. Ask your healthcare provider about the best way to do this.     Stripping  the tube helps keep blood clots from blocking the tube. Ask your nurse how often you should strip the tube. Stripping may not be needed, depending on where and why your doctor placed the tube. It may even be dangerous in some cases.     Hold the tubing where it leaves the skin, with one hand. This keeps it from pulling on the skin.    Pinch the tubing with the thumb and first finger of your other hand.    Slowly and firmly pull your thumb and first finger down the tubing. You may find it helpful to hold an alcohol swab between your fingers and the tube to lubricate the tubing.    If the pulling hurts or feels like it s coming out of the skin, stop. Begin again more gently.  Follow-up care  Make a follow-up appointment as directed by our staff.     When to seek medical care  Call your healthcare provider right away if you have any of the following:    New or increased pain around the tube    Redness, swelling, or warmth around the incision or tube    Drainage that is foul-smelling    Vomiting    Fever of 100.4 F (38 C)    Fluid leaking around the tube    Incision seems not to be healing    Stitches become loose    Tube falls out or breaks    Drainage that changes from light pink to dark  "red    Blood clots in the drainage bulb    A sudden increase or decrease in the amount of drainage (over 30 mL)   Date Last Reviewed: 2017-2017 [employer]. [address] [place] [address], [place] [address]. All rights reserved. This information is not intended as a substitute for professional medical care. Always follow your healthcare professional's instructions.          Pending Results     Date and Time Order Name Status Description    2018 1157 Abscess Culture Aerobic Bacterial Preliminary     2018 1157 Anaerobic bacterial culture Preliminary             Statement of Approval     Ordered          18 8668  I have reviewed and agree with all the recommendations and orders detailed in this document.  EFFECTIVE NOW     Approved and electronically signed by:  Marixa Trujillo MD             Admission Information     Date & Time Provider Department Dept. Phone    2018 Carlos Tee MD [place] [address] [facility] [phone]      Your Vitals Were     Blood Pressure Pulse Temperature Respirations Weight Pulse Oximetry    136/81 (BP Location: Right arm) 72 98.2  F (36.8  C) (Oral) 20 85.1 kg (187 lb 9.6 oz) 95%    BMI (Body Mass Index)                   29.6 kg/m2           MyChart Information     [employer] lets you send messages to your doctor, view your test results, renew your prescriptions, schedule appointments and more. To sign up, go to [email or web address] . Click on \"Log in\" on the left side of the screen, which will take you to the Welcome page. Then click on \"Sign up Now\" on the right side of the page.     You will be asked to enter the access code listed below, as well as some personal information. Please follow the directions to create your username and password.     Your access code is: [email or web address]  Expires: 2018  1:02 PM     Your access code will  in 90 days. If you need help or a new code, please call your [place] clinic or " [phone].        Care EveryWhere ID     This is your Care EveryWhere ID. This could be used by other organizations to access your [place] medical records  [id number]        Equal Access to Services     CESAR REYES : Hadii aad ku hadmargaretjohana Alfonso, waaxda luqadaha, qaybta kaalmada tristin, nitesh marshall tawana henry. So [facility] [phone].    ATENCIÓN: Si habla español, tiene a gee disposición servicios gratuitos de asistencia lingüística. Llame al [phone].    We comply with applicable federal civil rights laws and [place] laws. We do not discriminate on the basis of race, color, national origin, age, disability, sex, sexual orientation, or gender identity.               Review of your medicines      START taking        Dose / Directions    amoxicillin-clavulanate 1000-62.5 MG per 12 hr tablet   Commonly known as:  AUGMENTIN XR   Indication:  Abscess        Dose:  2 tablet   Take 2 tablets by mouth every 12 hours for 10 days   Quantity:  20 tablet   Refills:  0         CONTINUE these medicines which have NOT CHANGED        Dose / Directions    ACETAMINOPHEN PO        Dose:  1000 mg   Take 1,000 mg by mouth every 6 hours as needed   Refills:  0            Where to get your medicines      Some of these will need a paper prescription and others can be bought over the counter. Ask your nurse if you have questions.     Bring a paper prescription for each of these medications     amoxicillin-clavulanate 1000-62.5 MG per 12 hr tablet                Protect others around you: Learn how to safely use, store and throw away your medicines at www.disposemymeds.org.        ANTIBIOTIC INSTRUCTION     You've Been Prescribed an Antibiotic - Now What?  Your healthcare team thinks that you or your loved one might have an infection. Some infections can be treated with antibiotics, which are powerful, life-saving drugs. Like all medications, antibiotics have side effects and should only be used when  necessary. There are some important things you should know about your antibiotic treatment.      Your healthcare team may run tests before you start taking an antibiotic.    Your team may take samples (e.g., from your blood, urine or other areas) to run tests to look for bacteria. These test can be important to determine if you need an antibiotic at all and, if you do, which antibiotic will work best.      Within a few days, your healthcare team might change or even stop your antibiotic.    Your team may start you on an antibiotic while they are working to find out what is making you sick.    Your team might change your antibiotic because test results show that a different antibiotic would be better to treat your infection.    In some cases, once your team has more information, they learn that you do not need an antibiotic at all. They may find out that you don't have an infection, or that the antibiotic you're taking won't work against your infection. For example, an infection caused by a virus can't be treated with antibiotics. Staying on an antibiotic when you don't need it is more likely to be harmful than helpful.      You may experience side effects from your antibiotic.    Like all medications, antibiotics have side effects. Some of these can be serious.    Let you healthcare team know if you have any known allergies when you are admitted to the hospital.    One significant side effect of nearly all antibiotics is the risk of severe and sometimes deadly diarrhea caused by Clostridium difficile (C. Difficile). This occurs when a person takes antibiotics because some good germs are destroyed. Antibiotic use allows C. diificile to take over, putting patients at high risk for this serious infection.    As a patient or caregiver, it is important to understand your or your loved one's antibiotic treatment. It is especially important for caregivers to speak up when patients can't speak for themselves. Here are some  important questions to ask your healthcare team.    What infection is this antibiotic treating and how do you know I have that infection?    What side effects might occur from this antibiotic?    How long will I need to take this antibiotic?    Is it safe to take this antibiotic with other medications or supplements (e.g., vitamins) that I am taking?     Are there any special directions I need to know about taking this antibiotic? For example, should I take it with food?    How will I be monitored to know whether my infection is responding to the antibiotic?    What tests may help to make sure the right antibiotic is prescribed for me?      Information provided by:  www.cdc.gov/getsmart  U.S. Department of Health and Human Services  Centers for disease Control and Prevention  National Center for Emerging and Zoonotic Infectious Diseases  Division of Healthcare Quality Promotion             Medication List: This is a list of all your medications and when to take them. Check marks below indicate your daily home schedule. Keep this list as a reference.      Medications           Morning Afternoon Evening Bedtime As Needed    ACETAMINOPHEN PO   Take 1,000 mg by mouth every 6 hours as needed                                amoxicillin-clavulanate 1000-62.5 MG per 12 hr tablet   Commonly known as:  AUGMENTIN XR   Take 2 tablets by mouth every 12 hours for 10 days   Last time this was given:  2 tablets on 2/28/2018  7:46 AM   Next Dose Due:  This evening

## 2018-02-24 NOTE — IP AVS SNAPSHOT
[place] [address] [facility]    [address]    [facility] [address]    Phone:  [phone]    Fax:  [phone]                                       After Visit Summary   2/24/2018    Lennox Acuna    MRN: [id number]           After Visit Summary Signature Page     I have received my discharge instructions, and my questions have been answered. I have discussed any challenges I see with this plan with the nurse or doctor.    ..........................................................................................................................................  Patient/Patient Representative Signature      ..........................................................................................................................................  Patient Representative Print Name and Relationship to Patient    ..................................................               ................................................  Date                                            Time    ..........................................................................................................................................  Reviewed by Signature/Title    ...................................................              ..............................................  Date                                                            Time

## 2018-02-25 ENCOUNTER — APPOINTMENT (OUTPATIENT)
Dept: CT IMAGING | Facility: CLINIC | Age: 63
DRG: 392 | End: 2018-02-25
Attending: EMERGENCY MEDICINE
Payer: COMMERCIAL

## 2018-02-25 PROBLEM — K57.92 ACUTE DIVERTICULITIS: Status: ACTIVE | Noted: 2018-02-25

## 2018-02-25 LAB
ALBUMIN SERPL-MCNC: 2.8 G/DL (ref 3.4–5)
ALBUMIN UR-MCNC: 30 MG/DL
ALP SERPL-CCNC: 169 U/L (ref 40–150)
ALT SERPL W P-5'-P-CCNC: 23 U/L (ref 0–70)
ANION GAP SERPL CALCULATED.3IONS-SCNC: 8 MMOL/L (ref 3–14)
APPEARANCE UR: CLEAR
AST SERPL W P-5'-P-CCNC: 16 U/L (ref 0–45)
BACTERIA #/AREA URNS HPF: ABNORMAL /HPF
BASOPHILS # BLD AUTO: 0 10E9/L (ref 0–0.2)
BASOPHILS NFR BLD AUTO: 0.2 %
BILIRUB SERPL-MCNC: 0.2 MG/DL (ref 0.2–1.3)
BILIRUB UR QL STRIP: NEGATIVE
BUN SERPL-MCNC: 18 MG/DL (ref 7–30)
CALCIUM SERPL-MCNC: 9.1 MG/DL (ref 8.5–10.1)
CHLORIDE SERPL-SCNC: 105 MMOL/L (ref 94–109)
CO2 SERPL-SCNC: 25 MMOL/L (ref 20–32)
COLOR UR AUTO: YELLOW
CREAT SERPL-MCNC: 0.96 MG/DL (ref 0.66–1.25)
DIFFERENTIAL METHOD BLD: ABNORMAL
EOSINOPHIL # BLD AUTO: 0.1 10E9/L (ref 0–0.7)
EOSINOPHIL NFR BLD AUTO: 0.5 %
ERYTHROCYTE [DISTWIDTH] IN BLOOD BY AUTOMATED COUNT: 12.3 % (ref 10–15)
GFR SERPL CREATININE-BSD FRML MDRD: 79 ML/MIN/1.7M2
GLUCOSE SERPL-MCNC: 122 MG/DL (ref 70–99)
GLUCOSE UR STRIP-MCNC: NEGATIVE MG/DL
HCT VFR BLD AUTO: 42.2 % (ref 40–53)
HGB BLD-MCNC: 14.1 G/DL (ref 13.3–17.7)
HGB UR QL STRIP: ABNORMAL
HYALINE CASTS #/AREA URNS LPF: 1 /LPF (ref 0–2)
IMM GRANULOCYTES # BLD: 0.1 10E9/L (ref 0–0.4)
IMM GRANULOCYTES NFR BLD: 0.4 %
KETONES UR STRIP-MCNC: NEGATIVE MG/DL
LACTATE BLD-SCNC: 1.2 MMOL/L (ref 0.7–2)
LEUKOCYTE ESTERASE UR QL STRIP: NEGATIVE
LIPASE SERPL-CCNC: 154 U/L (ref 73–393)
LYMPHOCYTES # BLD AUTO: 1.5 10E9/L (ref 0.8–5.3)
LYMPHOCYTES NFR BLD AUTO: 8.5 %
MCH RBC QN AUTO: 30.3 PG (ref 26.5–33)
MCHC RBC AUTO-ENTMCNC: 33.4 G/DL (ref 31.5–36.5)
MCV RBC AUTO: 91 FL (ref 78–100)
MONOCYTES # BLD AUTO: 1.4 10E9/L (ref 0–1.3)
MONOCYTES NFR BLD AUTO: 7.8 %
MUCOUS THREADS #/AREA URNS LPF: PRESENT /LPF
NEUTROPHILS # BLD AUTO: 14.5 10E9/L (ref 1.6–8.3)
NEUTROPHILS NFR BLD AUTO: 82.6 %
NITRATE UR QL: NEGATIVE
NRBC # BLD AUTO: 0 10*3/UL
NRBC BLD AUTO-RTO: 0 /100
PH UR STRIP: 5 PH (ref 5–7)
PLATELET # BLD AUTO: 443 10E9/L (ref 150–450)
POTASSIUM SERPL-SCNC: 3.9 MMOL/L (ref 3.4–5.3)
PROT SERPL-MCNC: 7.5 G/DL (ref 6.8–8.8)
RBC # BLD AUTO: 4.66 10E12/L (ref 4.4–5.9)
RBC #/AREA URNS AUTO: 2 /HPF (ref 0–2)
SODIUM SERPL-SCNC: 138 MMOL/L (ref 133–144)
SOURCE: ABNORMAL
SP GR UR STRIP: 1.03 (ref 1–1.03)
UROBILINOGEN UR STRIP-MCNC: 0 MG/DL (ref 0–2)
WBC # BLD AUTO: 17.6 10E9/L (ref 4–11)
WBC #/AREA URNS AUTO: 1 /HPF (ref 0–2)

## 2018-02-25 PROCEDURE — 25800025 ZZH RX 258: Performed by: INTERNAL MEDICINE

## 2018-02-25 PROCEDURE — 25000128 H RX IP 250 OP 636: Performed by: INTERNAL MEDICINE

## 2018-02-25 PROCEDURE — 96361 HYDRATE IV INFUSION ADD-ON: CPT

## 2018-02-25 PROCEDURE — 81001 URINALYSIS AUTO W/SCOPE: CPT | Performed by: EMERGENCY MEDICINE

## 2018-02-25 PROCEDURE — 96376 TX/PRO/DX INJ SAME DRUG ADON: CPT

## 2018-02-25 PROCEDURE — 25000128 H RX IP 250 OP 636: Performed by: EMERGENCY MEDICINE

## 2018-02-25 PROCEDURE — 96375 TX/PRO/DX INJ NEW DRUG ADDON: CPT

## 2018-02-25 PROCEDURE — 74177 CT ABD & PELVIS W/CONTRAST: CPT

## 2018-02-25 PROCEDURE — 12000000 ZZH R&B MED SURG/OB

## 2018-02-25 PROCEDURE — 96365 THER/PROPH/DIAG IV INF INIT: CPT

## 2018-02-25 RX ORDER — NALOXONE HYDROCHLORIDE 0.4 MG/ML
.1-.4 INJECTION, SOLUTION INTRAMUSCULAR; INTRAVENOUS; SUBCUTANEOUS
Status: DISCONTINUED | OUTPATIENT
Start: 2018-02-25 | End: 2018-02-28 | Stop reason: HOSPADM

## 2018-02-25 RX ORDER — POTASSIUM CHLORIDE 29.8 MG/ML
20 INJECTION INTRAVENOUS
Status: DISCONTINUED | OUTPATIENT
Start: 2018-02-25 | End: 2018-02-28 | Stop reason: HOSPADM

## 2018-02-25 RX ORDER — MAGNESIUM SULFATE HEPTAHYDRATE 40 MG/ML
4 INJECTION, SOLUTION INTRAVENOUS EVERY 4 HOURS PRN
Status: DISCONTINUED | OUTPATIENT
Start: 2018-02-25 | End: 2018-02-28 | Stop reason: HOSPADM

## 2018-02-25 RX ORDER — HYDROMORPHONE HYDROCHLORIDE 1 MG/ML
.3-.5 INJECTION, SOLUTION INTRAMUSCULAR; INTRAVENOUS; SUBCUTANEOUS
Status: DISCONTINUED | OUTPATIENT
Start: 2018-02-25 | End: 2018-02-28 | Stop reason: HOSPADM

## 2018-02-25 RX ORDER — METOCLOPRAMIDE HYDROCHLORIDE 5 MG/ML
10 INJECTION INTRAMUSCULAR; INTRAVENOUS EVERY 6 HOURS PRN
Status: DISCONTINUED | OUTPATIENT
Start: 2018-02-25 | End: 2018-02-28 | Stop reason: HOSPADM

## 2018-02-25 RX ORDER — DEXTROSE MONOHYDRATE, SODIUM CHLORIDE, AND POTASSIUM CHLORIDE 50; 1.49; 4.5 G/1000ML; G/1000ML; G/1000ML
INJECTION, SOLUTION INTRAVENOUS CONTINUOUS
Status: DISCONTINUED | OUTPATIENT
Start: 2018-02-25 | End: 2018-02-28 | Stop reason: HOSPADM

## 2018-02-25 RX ORDER — METOCLOPRAMIDE 10 MG/1
10 TABLET ORAL EVERY 6 HOURS PRN
Status: DISCONTINUED | OUTPATIENT
Start: 2018-02-25 | End: 2018-02-28 | Stop reason: HOSPADM

## 2018-02-25 RX ORDER — POTASSIUM CHLORIDE 7.45 MG/ML
10 INJECTION INTRAVENOUS
Status: DISCONTINUED | OUTPATIENT
Start: 2018-02-25 | End: 2018-02-28 | Stop reason: HOSPADM

## 2018-02-25 RX ORDER — LABETALOL HYDROCHLORIDE 5 MG/ML
10 INJECTION, SOLUTION INTRAVENOUS ONCE
Status: COMPLETED | OUTPATIENT
Start: 2018-02-25 | End: 2018-02-25

## 2018-02-25 RX ORDER — IOPAMIDOL 755 MG/ML
500 INJECTION, SOLUTION INTRAVASCULAR ONCE
Status: COMPLETED | OUTPATIENT
Start: 2018-02-25 | End: 2018-02-25

## 2018-02-25 RX ORDER — PROCHLORPERAZINE MALEATE 5 MG
10 TABLET ORAL EVERY 6 HOURS PRN
Status: DISCONTINUED | OUTPATIENT
Start: 2018-02-25 | End: 2018-02-28 | Stop reason: HOSPADM

## 2018-02-25 RX ORDER — POTASSIUM CHLORIDE 1500 MG/1
20-40 TABLET, EXTENDED RELEASE ORAL
Status: DISCONTINUED | OUTPATIENT
Start: 2018-02-25 | End: 2018-02-28 | Stop reason: HOSPADM

## 2018-02-25 RX ORDER — ONDANSETRON 4 MG/1
4 TABLET, ORALLY DISINTEGRATING ORAL EVERY 6 HOURS PRN
Status: DISCONTINUED | OUTPATIENT
Start: 2018-02-25 | End: 2018-02-28 | Stop reason: HOSPADM

## 2018-02-25 RX ORDER — LABETALOL HYDROCHLORIDE 5 MG/ML
10 INJECTION, SOLUTION INTRAVENOUS
Status: DISCONTINUED | OUTPATIENT
Start: 2018-02-25 | End: 2018-02-28 | Stop reason: HOSPADM

## 2018-02-25 RX ORDER — PROCHLORPERAZINE 25 MG
25 SUPPOSITORY, RECTAL RECTAL EVERY 12 HOURS PRN
Status: DISCONTINUED | OUTPATIENT
Start: 2018-02-25 | End: 2018-02-28 | Stop reason: HOSPADM

## 2018-02-25 RX ORDER — POTASSIUM CL/LIDO/0.9 % NACL 10MEQ/0.1L
10 INTRAVENOUS SOLUTION, PIGGYBACK (ML) INTRAVENOUS
Status: DISCONTINUED | OUTPATIENT
Start: 2018-02-25 | End: 2018-02-28 | Stop reason: HOSPADM

## 2018-02-25 RX ORDER — POTASSIUM CHLORIDE 1.5 G/1.58G
20-40 POWDER, FOR SOLUTION ORAL
Status: DISCONTINUED | OUTPATIENT
Start: 2018-02-25 | End: 2018-02-28 | Stop reason: HOSPADM

## 2018-02-25 RX ORDER — ONDANSETRON 2 MG/ML
4 INJECTION INTRAMUSCULAR; INTRAVENOUS EVERY 6 HOURS PRN
Status: DISCONTINUED | OUTPATIENT
Start: 2018-02-25 | End: 2018-02-28 | Stop reason: HOSPADM

## 2018-02-25 RX ORDER — ACETAMINOPHEN 10 MG/ML
1000 INJECTION, SOLUTION INTRAVENOUS EVERY 8 HOURS PRN
Status: DISCONTINUED | OUTPATIENT
Start: 2018-02-25 | End: 2018-02-27

## 2018-02-25 RX ADMIN — Medication 0.5 MG: at 03:19

## 2018-02-25 RX ADMIN — TAZOBACTAM SODIUM AND PIPERACILLIN SODIUM 3.38 G: 375; 3 INJECTION, SOLUTION INTRAVENOUS at 07:58

## 2018-02-25 RX ADMIN — Medication 0.5 MG: at 10:24

## 2018-02-25 RX ADMIN — Medication 0.5 MG: at 19:58

## 2018-02-25 RX ADMIN — Medication 0.5 MG: at 23:50

## 2018-02-25 RX ADMIN — Medication 0.5 MG: at 17:27

## 2018-02-25 RX ADMIN — POTASSIUM CHLORIDE, DEXTROSE MONOHYDRATE AND SODIUM CHLORIDE: 150; 5; 450 INJECTION, SOLUTION INTRAVENOUS at 19:42

## 2018-02-25 RX ADMIN — TAZOBACTAM SODIUM AND PIPERACILLIN SODIUM 3.38 G: 375; 3 INJECTION, SOLUTION INTRAVENOUS at 14:14

## 2018-02-25 RX ADMIN — TAZOBACTAM SODIUM AND PIPERACILLIN SODIUM 4.5 G: 500; 4 INJECTION, SOLUTION INTRAVENOUS at 01:45

## 2018-02-25 RX ADMIN — Medication 0.5 MG: at 01:20

## 2018-02-25 RX ADMIN — SODIUM CHLORIDE 64 ML: 9 INJECTION, SOLUTION INTRAVENOUS at 00:49

## 2018-02-25 RX ADMIN — Medication 0.5 MG: at 06:26

## 2018-02-25 RX ADMIN — Medication 0.5 MG: at 08:21

## 2018-02-25 RX ADMIN — Medication 0.5 MG: at 13:20

## 2018-02-25 RX ADMIN — TAZOBACTAM SODIUM AND PIPERACILLIN SODIUM 3.38 G: 375; 3 INJECTION, SOLUTION INTRAVENOUS at 19:42

## 2018-02-25 RX ADMIN — LABETALOL HYDROCHLORIDE 10 MG: 5 INJECTION, SOLUTION INTRAVENOUS at 01:43

## 2018-02-25 RX ADMIN — IOPAMIDOL 95 ML: 755 INJECTION, SOLUTION INTRAVENOUS at 00:49

## 2018-02-25 RX ADMIN — POTASSIUM CHLORIDE, DEXTROSE MONOHYDRATE AND SODIUM CHLORIDE: 150; 5; 450 INJECTION, SOLUTION INTRAVENOUS at 03:20

## 2018-02-25 RX ADMIN — SODIUM CHLORIDE 1000 ML: 9 INJECTION, SOLUTION INTRAVENOUS at 00:01

## 2018-02-25 ASSESSMENT — ACTIVITIES OF DAILY LIVING (ADL)
ADLS_ACUITY_SCORE: 9
ADLS_ACUITY_SCORE: 9
ADLS_ACUITY_SCORE: 15
ADLS_ACUITY_SCORE: 9
ADLS_ACUITY_SCORE: 15

## 2018-02-25 NOTE — PROGRESS NOTES
Patient seen and examined. H&P reviewed.  62-year-old male patient with no significant past medical history was admitted this morning by Carlos Tee MD, for acute sigmoid diverticulitis with possible abscess formation.  He was put on IV fluid and IV Zosyn.  Colorectal surgery consulted and recommended to keep him n.p.o. and continue IV Zosyn and IV fluid.  Also recommended interventional radiology consult on Monday for possible percutaneous drainage.

## 2018-02-25 NOTE — CONSULTS
Colon and Rectal Surgery Consult Note  Name: Lennox Acuna    MRN: [id number]  YOB: 1955    Age: 62 year old  Date of admission: 2/24/2018  Primary care provider: [facility]     Requesting Physician:  Dr. Tee  Reason for consult:  Diverticulitis           History of Present Illness:   Lennox Acuna is a 62 year old male who developed 6 days of constipation approximately 2 weeks ago. He was evaluated by his PCP and given a medication that resolved his symptoms. He  then developed abdominal pain and testicular pain approximately 2 days after being evaluated. The patient followed up with his PCP and was diagnosed with epididymitis. He was prescribed doxycycline without  resolution of his symptoms, prompting a visit to the [facility] Emergency room. His abdominal and testicular pain had been intermittent but became constant on day of admission. He has had some nausea and difficulty with urination, but denies vomiting, fever, or chills. CT scan on admission shows diverticulitis of mid-sigmoid with an irregular area of fluid and fat stranding measuring 8 x 5.7 cm adjacent to sigmoid suspicious for developing abscess. Pt had a colonoscopy 5-6 years ago, which only showed diverticulosis.              Past Medical History:     Past Medical History:   Diagnosis Date     HL (hearing loss)              Past Surgical History:     Past Surgical History:   Procedure Laterality Date     COLONOSCOPY  2010    every 5 years/ DR Daugherty     HAND SURGERY  2/2012    thumb     HERNIA REPAIR  1990's    left     LASER DIODE STAPEDOTOMY Left 1/12/2015    Procedure: LASER DIODE STAPEDOTOMY;  Surgeon: Layton Garcia MD;  Location: [facility] OR     STRESS TEST  age 48    WNL     TONSILLECTOMY  age 8     TYMPANOMASTOIDECTOMY  2010/2011    facial nerve tumors     TYMPANOMASTOIDECTOMY WITH FACIAL MONITORING  12/19/2011    Procedure:TYMPANOMASTOIDECTOMY WITH FACIAL MONITORING; Left Revision Tympanomastoidectomy  with Partial  Ossicular Chain Reconstruction, using Facial Nerve Monitoring **latex safe room; Surgeon:TASHA CARRILLO; Location:[facility] OR               Social History:     Social History   Substance Use Topics     Smoking status: Never Smoker     Smokeless tobacco: Never Used     Alcohol use No      Comment: 2-3 drinks per week             Family History:     Family History   Problem Relation Age of Onset     Respiratory Father 81     copd       Hypertension Father      Breast Cancer Mother 60      67     Hypertension Mother      Hypertension Sister      Hypertension Brother      DIABETES No family hx of              Allergies:     Allergies   Allergen Reactions     Nkda [No Known Drug Allergies]              Medications:       piperacillin-tazobactam  3.375 g Intravenous Q6H             Review of Systems:   A comprehensive greater than 10 system review of systems was carried out.  Pertinent positives and negatives are noted above.  Otherwise negative for contributory info.            Physical Exam:   Blood pressure 135/78, pulse 71, temperature 97.4  F (36.3  C), temperature source Oral, resp. rate 16, weight 86.3 kg (190 lb 3.2 oz), SpO2 96 %.  No intake or output data in the 24 hours ending 18 0917  Exam:  General - Awake alert and oriented  Pulm - Non labored breathing  CVS - reg rate and rhythm  Abd - Soft, ND. Mild tenderness suprapubically. No rebound or guarding.  Ext - warm without edema         Data:     Recent Results (from the past 24 hour(s))   CT Abdomen Pelvis w Contrast    Narrative    CT ABDOMEN PELVIS W CONTRAST   2018 12:52 AM     HISTORY: Right lower quadrant pain.    TECHNIQUE: 95mL Isovue-370 IV were administered. After contrast  administration, volumetric helical sections were acquired from the  lung bases to the ischial tuberosities. Coronal images were also  reconstructed. Radiation dose for this scan was reduced using  automated exposure control, adjustment of the mA and/or  "kV according  to patient size, or iterative reconstruction technique.    COMPARISON: None.     FINDINGS: Scattered colonic diverticulosis. Focal bowel wall  thickening in the mid sigmoid colon is consistent with acute  diverticulitis. There is a small peripherally enhancing fluid  collection within the bowel wall of the sigmoid colon (series 2, image  67) measuring up to 2.3 cm likely representing a small intramural  abscess. Adjacent to the thickened sigmoid colon, there is an  irregular area of fluid and fat stranding measuring approximately 8 x  5.7 cm, suspicious for a developing abscess. No air bubbles are seen  within these areas of perisigmoid fluid.    No bowel obstruction. The appendix is well seen and is unremarkable.  Small fat-containing paraumbilical hernia. Mild atherosclerotic  aortoiliac calcification. The liver, gallbladder, spleen, adrenal  glands, pancreas, and kidneys are unremarkable. No hydronephrosis.  Small hiatal hernia. The visualized lung bases are clear.      Impression    IMPRESSION:   1. Acute diverticulitis.  2. Ill-defined fluid and stranding adjacent to the thickened sigmoid  colon is suspicious for a developing abscess.  3. A smaller area of fluid density within the bowel wall of the  thickened sigmoid colon likely represents a small intramural abscess.    JHON ALBARRAN MD         Recent Labs  Lab 02/24/18  2353   WBC 17.6*   HGB 14.1   HCT 42.2   MCV 91          Recent Labs  Lab 02/24/18  2353      POTASSIUM 3.9   CHLORIDE 105   CO2 25   ANIONGAP 8   *   BUN 18   CR 0.96   GFRESTIMATED 79   GFRESTBLACK >90   LEYDI 9.1         Assessment and Plan:   62 year old male with 1st attack of diverticulitis. WBC elevated at 17, but abd exam benign. No need for emergent surgery. I personally reviewed the images of the CT scan, and it is somewhat unclear whether the \"ill-defined fluid and stranding\" next to sigmoid colon is a drainable abscess vs. Inflammation. "     Plan: NPO   IV abx (Zosyn)   Consult interventional radiology on Mon for possible percutaneous drainage   Recheck WBC in am   Will follow        Maxx Vázquez MD  [facility]  Pager:  [phone]  Phone: [phone]\

## 2018-02-25 NOTE — PLAN OF CARE
Problem: Patient Care Overview  Goal: Plan of Care/Patient Progress Review  Outcome: No Change  VSS continues to have abdominal pain PRN IV dilaudid given with some relief.Alert/Oriented X 4 ambulates independently IV fluids running NPO except for ice chips for colorectal surgery consult.

## 2018-02-25 NOTE — ED NOTES
Lower abdominal pain and left groin pain. Patient states he was started on doxycyline for epididymitis. ABC intact alert and no distress.

## 2018-02-25 NOTE — H&P
Hospitalist Admission Note([facility]/Critical access hospital)    Name: Lennox Acuna    MRN: [id number]          YOB: 1955    Age: 62 year old  Date of admission: 2/24/2018  Primary care provider: [facility]              Assessment:       Brief summary of admission assessment:Lennox Acuna is a 62 year old  male with a significant past medical history of no significant medical problems  who presents with abdominal pain.    ED evaluation revealed CT evidence of Acute diverticulitis    Patient's presentation is consistent with acute complicated diverticulitis and will be admitted for further inpatient management to care of Hospitalist service.      Admission diagnoses:      #1.    Acute mid sigmoid diverticulitis with possible abscess formation without perforation    #2.  Abdominal pain secondary to #1    #3.  Hypertensive urgency with blood pressure over 200 when he first presented    #4.  Leukocytosis with left shift, normal lactic acid             Plan/MDM:       > Admission Status: Will admit patient to hospitalist service as inpatient as patient likely need over two mid night stays in the hospital.     >Care plan:    --Admission to the hospital for intravenous antibiotic, pain control, bowel rest and close monitoring as well as colorectal surgery consultation for definitive management  --Pain control with intravenous narcotic as needed  --Intravenous IV fluid hydration  --Intravenous blood pressure medication as needed to control blood pressure to acceptable range, consider starting an oral antihypertensive medication and discharge since patient may have underlying undiagnosed hypertension  --We will continue intravenous Zosyn started in the emergency department    >Supportive care:IV fluids increased  Pain management: anxiolytics and IV narcotics  Nausea and vomiting control measures    >Diet:Keep NPO for now    >Activity:Advance activity as tolerated    >Education/Counseling  :Discussed treatment plan with the patient    >Consults:Inpatient consult with surgery    >VTE prophylactic measures:prophylaxis against venous thromboembolism    >Therapies: None for now      >Additional orders:      --Care plan discussed with the patient/family and agreed to care plan   --Patient will be transferred to care of hospitalist attending for further evaluation and management as appropriate   --Old medical orders reviewed   --imaging result independently reviewed by me     (See orders placed for this visit by me )     - Home medication reviewed and will be continued as appropriate once pharmacy reconciliation is completed         Code Status/Disposition:     >Code Status:Full Code      >Disposition:anticipate discharge to home and Anticipate discharge in 3-4 days        Disclaimer: This note consists of symbols derived from keyboarding, dictation and/or voice recognition software. As a result, there may be errors in the script that have gone undetected. Please consider this when interpreting information found in this chart.             Chief Complaint:     Abdominal pain      History is obtained from the patient          History of Present Illness:      This patient is a 62 year old  male with a significant past medical history of hernia repair  who presents with the following condition requiring a hospital admission:        Abdominal Pain  Patient complains of abdominal pain. The pain is described as sharp, and is 9/10 in intensity. The patient is experiencing LLQ and suprapubic pain without radiation. Onset was a few days ago. Symptoms have been gradually worsening. Aggravating factors: nothing.  Alleviating factors: nothing. Associated symptoms: nausea and testicular pain . The patient denies fever.  Patient was told to have epididymitis by PCP but he did not get better with doxycycline.           Past Medical History:     Past Medical History:   Diagnosis Date     HL (hearing loss)             Past  Surgical History:     Past Surgical History:   Procedure Laterality Date     COLONOSCOPY      every 5 years/ DR Daugherty     HAND SURGERY  2012    thumb     HERNIA REPAIR      left     LASER DIODE STAPEDOTOMY Left 2015    Procedure: LASER DIODE STAPEDOTOMY;  Surgeon: Tasha Garcia MD;  Location: [facility] OR     STRESS TEST  age 48    WNL     TONSILLECTOMY  age 8     TYMPANOMASTOIDECTOMY      facial nerve tumors     TYMPANOMASTOIDECTOMY WITH FACIAL MONITORING  2011    Procedure:TYMPANOMASTOIDECTOMY WITH FACIAL MONITORING; Left Revision Tympanomastoidectomy with Partial  Ossicular Chain Reconstruction, using Facial Nerve Monitoring **latex safe room; Surgeon:TASHA GARCIA; Location:[facility] OR             Social History:     Social History   Substance Use Topics     Smoking status: Never Smoker     Smokeless tobacco: Never Used     Alcohol use No      Comment: 2-3 drinks per week             Family History:     Family History   Problem Relation Age of Onset     Respiratory Father 81     copd       Hypertension Father      Breast Cancer Mother 60      67     Hypertension Mother      Hypertension Sister      Hypertension Brother      DIABETES No family hx of             Allergies:     Allergies   Allergen Reactions     Nkda [No Known Drug Allergies]              Medications:        Prior to Admission medications    Medication Sig Last Dose Taking? Auth Provider   doxycycline Monohydrate 100 MG TABS Take 100 mg by mouth 2 times daily for 14 days  Yes Tracy Hilliard MD   ACETAMINOPHEN PO Take 500 mg by mouth once   Reported, Patient   IBUPROFEN PO Take by mouth daily   Reported, Patient          Review of Systems:     A Comprehensive greater than 10 system review of systems was carried out.  Pertinent positives and negatives are noted above in HPI.  Otherwise negative for contributory information.           Physical Exam:     Vital signs were reviewed    Temp:  [98.8  F (37.1  C)] 98.8   F (37.1  C)  Pulse:  [100] 100  Resp:  [20] 20  BP: (142-202)/() 193/123  SpO2:  [93 %-97 %] 93 %        GEN: awake, alert, cooperative, no apparent distress, oriented x 3    NECK:Supple ,no mass or thyromegaly     HEENT:  Normocephalic/atraumatic, no scleral icterus, no nasal discharge, mouth moist.    CV:  Regular rate and rhythm, no murmur or JVD.  S1 + S2 noted, no S3 or S4.    LUNGS:  Clear to auscultation bilaterally without rales/rhonchi/wheezing/retractions.  Symmetric chest rise on inhalation noted.    ABD:  Active bowel sounds, soft, mild tenderness in LLQ and suprapubic area  No rebound/guarding/rigidity.    EXT:  No edema.  No cyanosis.  No joint synovitis noted.Lower extremity pulses are normal bilaterally and     LGS: No cervical or axillary lymphadenopathy     SKIN:  Dry to touch, warm ,no exanthems noted in the visualized areas.    Neurologic:Grossly intact,non focal . No acute focal neurologic deficit     Psychaitric exam: Mood and affect normal              Data:       All laboratory and imaging data in the past 24 hours reviewed     Results for orders placed or performed during the hospital encounter of 02/24/18   CT Abdomen Pelvis w Contrast    Narrative    CT ABDOMEN PELVIS W CONTRAST   2/25/2018 12:52 AM     HISTORY: Right lower quadrant pain.    TECHNIQUE: 95mL Isovue-370 IV were administered. After contrast  administration, volumetric helical sections were acquired from the  lung bases to the ischial tuberosities. Coronal images were also  reconstructed. Radiation dose for this scan was reduced using  automated exposure control, adjustment of the mA and/or kV according  to patient size, or iterative reconstruction technique.    COMPARISON: None.     FINDINGS: Scattered colonic diverticulosis. Focal bowel wall  thickening in the mid sigmoid colon is consistent with acute  diverticulitis. There is a small peripherally enhancing fluid  collection within the bowel wall of the sigmoid colon  (series 2, image  67) measuring up to 2.3 cm likely representing a small intramural  abscess. Adjacent to the thickened sigmoid colon, there is an  irregular area of fluid and fat stranding measuring approximately 8 x  5.7 cm, suspicious for a developing abscess. No air bubbles are seen  within these areas of perisigmoid fluid.    No bowel obstruction. The appendix is well seen and is unremarkable.  Small fat-containing paraumbilical hernia. Mild atherosclerotic  aortoiliac calcification. The liver, gallbladder, spleen, adrenal  glands, pancreas, and kidneys are unremarkable. No hydronephrosis.  Small hiatal hernia. The visualized lung bases are clear.      Impression    IMPRESSION:   1. Acute diverticulitis.  2. Ill-defined fluid and stranding adjacent to the thickened sigmoid  colon is suspicious for a developing abscess.  3. A smaller area of fluid density within the bowel wall of the  thickened sigmoid colon likely represents a small intramural abscess.     CBC with platelets differential   Result Value Ref Range    WBC 17.6 (H) 4.0 - 11.0 10e9/L    RBC Count 4.66 4.4 - 5.9 10e12/L    Hemoglobin 14.1 13.3 - 17.7 g/dL    Hematocrit 42.2 40.0 - 53.0 %    MCV 91 78 - 100 fl    MCH 30.3 26.5 - 33.0 pg    MCHC 33.4 31.5 - 36.5 g/dL    RDW 12.3 10.0 - 15.0 %    Platelet Count 443 150 - 450 10e9/L    Diff Method Automated Method     % Neutrophils 82.6 %    % Lymphocytes 8.5 %    % Monocytes 7.8 %    % Eosinophils 0.5 %    % Basophils 0.2 %    % Immature Granulocytes 0.4 %    Nucleated RBCs 0 0 /100    Absolute Neutrophil 14.5 (H) 1.6 - 8.3 10e9/L    Absolute Lymphocytes 1.5 0.8 - 5.3 10e9/L    Absolute Monocytes 1.4 (H) 0.0 - 1.3 10e9/L    Absolute Eosinophils 0.1 0.0 - 0.7 10e9/L    Absolute Basophils 0.0 0.0 - 0.2 10e9/L    Abs Immature Granulocytes 0.1 0 - 0.4 10e9/L    Absolute Nucleated RBC 0.0    Comprehensive metabolic panel   Result Value Ref Range    Sodium 138 133 - 144 mmol/L    Potassium 3.9 3.4 - 5.3  mmol/L    Chloride 105 94 - 109 mmol/L    Carbon Dioxide 25 20 - 32 mmol/L    Anion Gap 8 3 - 14 mmol/L    Glucose 122 (H) 70 - 99 mg/dL    Urea Nitrogen 18 7 - 30 mg/dL    Creatinine 0.96 0.66 - 1.25 mg/dL    GFR Estimate 79 >60 mL/min/1.7m2    GFR Estimate If Black >90 >60 mL/min/1.7m2    Calcium 9.1 8.5 - 10.1 mg/dL    Bilirubin Total 0.2 0.2 - 1.3 mg/dL    Albumin 2.8 (L) 3.4 - 5.0 g/dL    Protein Total 7.5 6.8 - 8.8 g/dL    Alkaline Phosphatase 169 (H) 40 - 150 U/L    ALT 23 0 - 70 U/L    AST 16 0 - 45 U/L   Lipase   Result Value Ref Range    Lipase 154 73 - 393 U/L   UA with Microscopic   Result Value Ref Range    Color Urine Yellow     Appearance Urine Clear     Glucose Urine Negative NEG^Negative mg/dL    Bilirubin Urine Negative NEG^Negative    Ketones Urine Negative NEG^Negative mg/dL    Specific Gravity Urine 1.028 1.003 - 1.035    Blood Urine Small (A) NEG^Negative    pH Urine 5.0 5.0 - 7.0 pH    Protein Albumin Urine 30 (A) NEG^Negative mg/dL    Urobilinogen mg/dL 0.0 0.0 - 2.0 mg/dL    Nitrite Urine Negative NEG^Negative    Leukocyte Esterase Urine Negative NEG^Negative    Source Midstream Urine     WBC Urine 1 0 - 2 /HPF    RBC Urine 2 0 - 2 /HPF    Bacteria Urine Few (A) NEG^Negative /HPF    Mucous Urine Present (A) NEG^Negative /LPF    Hyaline Casts 1 0 - 2 /LPF   Lactic acid whole blood   Result Value Ref Range    Lactic Acid 1.2 0.7 - 2.0 mmol/L            Recent Results (from the past 48 hour(s))   CT Abdomen Pelvis w Contrast    [facility]    CT ABDOMEN PELVIS W CONTRAST   2/25/2018 12:52 AM     HISTORY: Right lower quadrant pain.    TECHNIQUE: 95mL Isovue-370 IV were administered. After contrast  administration, volumetric helical sections were acquired from the  lung bases to the ischial tuberosities. Coronal images were also  reconstructed. Radiation dose for this scan was reduced using  automated exposure control, adjustment of the mA and/or kV according  to patient size, or iterative  reconstruction technique.    COMPARISON: None.     FINDINGS: Scattered colonic diverticulosis. Focal bowel wall  thickening in the mid sigmoid colon is consistent with acute  diverticulitis. There is a small peripherally enhancing fluid  collection within the bowel wall of the sigmoid colon (series 2, image  67) measuring up to 2.3 cm likely representing a small intramural  abscess. Adjacent to the thickened sigmoid colon, there is an  irregular area of fluid and fat stranding measuring approximately 8 x  5.7 cm, suspicious for a developing abscess. No air bubbles are seen  within these areas of perisigmoid fluid.    No bowel obstruction. The appendix is well seen and is unremarkable.  Small fat-containing paraumbilical hernia. Mild atherosclerotic  aortoiliac calcification. The liver, gallbladder, spleen, adrenal  glands, pancreas, and kidneys are unremarkable. No hydronephrosis.  Small hiatal hernia. The visualized lung bases are clear.      Impression    IMPRESSION:   1. Acute diverticulitis.  2. Ill-defined fluid and stranding adjacent to the thickened sigmoid  colon is suspicious for a developing abscess.  3. A smaller area of fluid density within the bowel wall of the  thickened sigmoid colon likely represents a small intramural abscess.            All imaging studies reviewed by me.         Patient`s old medical records reviewed and case discussed with the ED physician.    ED course-Reviewed

## 2018-02-25 NOTE — ED PROVIDER NOTES
History     Chief Complaint:  Abdominal Pain      HPI   Lennox Acuna is a generally healthy 62 year old male who presents with abdominal and testicular pain. The patient states that he had developed 6 days of constipation approximately 2 weeks ago and was evaluated by his PCP and given a medication that had resolved his symptoms. He notes he then developed abdominal pain and testicular pain approximately 2 days after being evaluated. The patient had followed up once again with his PCP and diagnosed him with epididymis and was prescribed an antibiotic. He notes that he has had no resolution in symptoms, prompting his ED visit. Here, the patient states that the abdominal and testicular pain has been intermittent in nature, but became constant today.   Pain is aching, severe, with no alleviating or specific worsening factors.  He additionally complains of nausea and slight difficulty with initiating urination, but denies vomiting, changes in urination or bowel movement abnormalities.       Allergies:  NKDA    Medications:    Doxycycline    Past Medical History:    Hearing Loss    Past Surgical History:    Hand surgery  Left hernia repair  Laser diode stapedectomy  Stress test  Tonsillectomy  Tympanomastoidectomy with facial monitoring    Family History:    COPD  Hypertension  Breast Cancer    Social History:  Presents with his wife.   Positive for alcohol use.    Negative for tobacco use.   Marital Status:   [2]     Review of Systems   Gastrointestinal: Positive for abdominal pain and nausea. Negative for constipation, diarrhea and vomiting.   Genitourinary: Positive for testicular pain. Negative for dysuria and enuresis.   All other systems reviewed and are negative.      Physical Exam   First Vitals:  BP: (!) 202/111  Pulse: 100  Temp: 98.8  F (37.1  C)  Resp: 20  SpO2: 97 %    Physical Exam  Constitutional: Well developed, nontox appearance, appears uncomfortable  Head: Atraumatic.   Mouth/Throat:  Oropharynx is clear and moist.   Neck:  no stridor  Eyes: no scleral icterus  Cardiovascular: RRR, 2+ bilat radial pulses, DP, PT  Pulmonary/Chest: nml resp effort, Clear BS bilat  Abdominal: ND, +BS, soft, no rebound, right lower quadrant tenderness with voluntary guarding   : no CVA tenderness bilat, no scrotal swelling or erythema. Minimal right testicular tenderness without swelling. Left testicle nontender, without swelling.  No hernias palpated.   Ext: Warm, well perfused, no edema  Neurological: A&O, symmetric facies, moves ext x4  Skin: Skin is warm and dry.   Psychiatric: Behavior is normal. Thought content normal.   Nursing note and vitals reviewed.    Emergency Department Course     Imaging:  Radiographic findings were communicated with the patient who voiced understanding of the findings.  CT Abdomen/Pelvis with IV contrast:     CT Abdomen Pelvis w Contrast   Preliminary Result   IMPRESSION:    1. Acute diverticulitis.   2. Ill-defined fluid and stranding adjacent to the thickened sigmoid   colon is suspicious for a developing abscess.   3. A smaller area of fluid density within the bowel wall of the   thickened sigmoid colon likely represents a small intramural abscess.          As per radiology.    Laboratory:  CBC: WBC: 17.6 (H), HGB: 14.1, PLT: 43  CMP: Glucose 122 (H), Albumin: 2.8 (L), Alkphos: 169 (H), o/w WNL (Creatinine: 0.96)    Lipase: 154  Lactic acid: 1.2  UA with micro: pending    Interventions:  Medications   HYDROmorphone (PF) (DILAUDID) injection 0.5 mg (0.5 mg Intravenous Given 2/25/18 0120)   piperacillin-tazobactam (ZOSYN) intermittent infusion 4.5 g (not administered)   labetalol (NORMODYNE/TRANDATE) injection 10 mg (not administered)   0.9% sodium chloride BOLUS (1,000 mLs Intravenous New Bag 2/25/18 0001)   ondansetron (ZOFRAN) injection 4 mg (4 mg Intravenous Given 2/24/18 2351)   0.9% sodium chloride BOLUS (0 mLs Intravenous Stopped 2/25/18 0052)   iopamidol (ISOVUE-370)  solution 500 mL (95 mLs Intravenous Given 18 0049)     2351 Zofran, 4 mg, IV  2352 Dilaudid, 0.5 mg, IV  0001 0.9% Sodium Chloride Bolus, 1L, IV      Emergency Department Course:  Nursing notes and vitals reviewed.     2310  I performed an exam of the patient as documented above.     IV inserted. Medicine administered as documented above. Blood drawn. This was sent to the lab for further testing, results above. The patient provided a urine sample here in the emergency department. This was sent for laboratory testing, findings above.      The patient was sent for an abdominal CT while in the emergency department, findings above.     0132  I consulted with Dr. Tee of the hospitalist services. They are in agreement to accept the patient for admission.    Findings and plan explained to the Patient who consents to admission. Discussed the patient with Dr. Tee, who will admit the patient to a medical bed for further monitoring, evaluation, and treatment.       Impression & Plan      Medical Decision Makin-year-old male presenting with abdominal pain, testicular pain    Differential diagnosis includes diverticulitis, appendicitis, dissection, UTI, epididymal orchitis.  His exam is more concerning for an intra-abdominal etiology and testicular or scrotal etiology.  Labs ordered as noted above and significant for leukocytosis, hypoalbuminemia, UA inconsistent with infection, normal lactate level.  CT abdomen pelvis ordered with results noted above and significant for acute diverticulitis with findings of possible early abscess.  IV Zosyn ordered in the emergency department the patient was subsequently admitted to a medical inpatient bed.  He was counseled on results, diagnosis and disposition.  He is understanding and agreeable to plan.  Patient subsequently admitted in stable condition.  Patient has been hypertensive during his ED stay initially thought more likely to be secondary to pain.  There is  minimal change after receiving multiple doses of pain medications.  He may have underlying hypertension that has not been diagnosed.  He was subsequently given labetalol in the emergency department for further management of his hypertension.    Diagnosis:    ICD-10-CM    1. Acute diverticulitis K57.92        Disposition:  Admitted to inpatient    Discharge Medications:  New Prescriptions    No medications on file     IRadha am serving as a scribe on 2/24/2018 at 11:10 PM to personally document services performed by Rolando Urias MD based on my observations and the provider's statements to me.      Radha Montenegro  2/24/2018   [facility] EMERGENCY DEPARTMENT       Rolando Urias MD  02/25/18 0137

## 2018-02-25 NOTE — ED NOTES
[facility]  ED Nurse Handoff Report    Lennox Acuna is a 62 year old male   ED Chief complaint: Abdominal Pain  . ED Diagnosis:   Final diagnoses:   Acute diverticulitis     Allergies:   Allergies   Allergen Reactions     Nkda [No Known Drug Allergies]        Code Status: Full Code  Activity level - Baseline/Home:  Independent. Activity Level - Current:   Independent. Lift room needed: No. Bariatric: No   Needed: No   Isolation: No. Infection: Not Applicable.     Vital Signs:   Vitals:    02/25/18 0000 02/25/18 0015 02/25/18 0030 02/25/18 0121   BP: (!) 198/114 (!) 193/102 (!) 188/109 (!) 193/123   Pulse:       Resp:       Temp:       SpO2:  93% 93%        Cardiac Rhythm:  ,      Pain level: 0-10 Pain Scale: 8  Patient confused: No. Patient Falls Risk: No.   Elimination Status: Has voided   Patient Report - Initial Complaint: abd pain. Focused Assessment: had been being treated for epididymytis for 2 weeks, diffuse abd pain has become worse last few days with constipation  Tests Performed: labs abd CT Abnormal Results: diverticulitis with early possible abcess   Treatments provided: fluids, zofran dilaudid  Family Comments: wife here  OBS brochure/video discussed/provided to patient:  N/A  ED Medications:   Medications   HYDROmorphone (PF) (DILAUDID) injection 0.5 mg (0.5 mg Intravenous Given 2/25/18 0120)   piperacillin-tazobactam (ZOSYN) intermittent infusion 4.5 g (not administered)   0.9% sodium chloride BOLUS (1,000 mLs Intravenous New Bag 2/25/18 0001)   ondansetron (ZOFRAN) injection 4 mg (4 mg Intravenous Given 2/24/18 2351)   0.9% sodium chloride BOLUS (0 mLs Intravenous Stopped 2/25/18 0052)   iopamidol (ISOVUE-370) solution 500 mL (95 mLs Intravenous Given 2/25/18 0049)     Drips infusing:  Yes  For the majority of the shift, the patient's behavior Green. Interventions performed were pain meds, B/Pmeds.     Severe Sepsis OR Septic Shock Diagnosis Present: No      ED Nurse  Name/Phone Number: Margie Valverde,   1:27 AM  RECEIVING UNIT ED HANDOFF REVIEW    Above ED Nurse Handoff Report was reviewed: Yes  Reviewed by: Lyudmila Mao on February 25, 2018 at 2:38 AM

## 2018-02-25 NOTE — PHARMACY-ADMISSION MEDICATION HISTORY
Admission medication history interview status for this patient is complete. See [facility] admission navigator for allergy information, prior to admission medications and immunization status.     Medication history interview source(s):Patient  Medication history resources (including written lists, pill bottles, clinic record): [facility] list  Primary pharmacy:[facility]    Changes made to PTA medication list:  Added: Tylenol directions.  Deleted: Doxycycline (Started 2/21/18, stopped as not working)  Changed: none    Actions taken by pharmacist (provider contacted, etc):None     Additional medication history information:None    Medication reconciliation/reorder completed by provider prior to medication history? Yes      Prior to Admission medications    Medication Sig Last Dose Taking? Auth Provider   ACETAMINOPHEN PO Take 1,000 mg by mouth every 6 hours as needed  2/24/2018 at Unknown time Yes Reported, Patient

## 2018-02-26 ENCOUNTER — TELEPHONE (OUTPATIENT)
Dept: FAMILY MEDICINE | Facility: CLINIC | Age: 63
End: 2018-02-26

## 2018-02-26 ENCOUNTER — APPOINTMENT (OUTPATIENT)
Dept: CT IMAGING | Facility: CLINIC | Age: 63
DRG: 392 | End: 2018-02-26
Attending: PHYSICIAN ASSISTANT
Payer: COMMERCIAL

## 2018-02-26 LAB
ANION GAP SERPL CALCULATED.3IONS-SCNC: 4 MMOL/L (ref 3–14)
BUN SERPL-MCNC: 8 MG/DL (ref 7–30)
CALCIUM SERPL-MCNC: 8.9 MG/DL (ref 8.5–10.1)
CHLORIDE SERPL-SCNC: 101 MMOL/L (ref 94–109)
CO2 SERPL-SCNC: 29 MMOL/L (ref 20–32)
CREAT SERPL-MCNC: 1.06 MG/DL (ref 0.66–1.25)
ERYTHROCYTE [DISTWIDTH] IN BLOOD BY AUTOMATED COUNT: 12.2 % (ref 10–15)
GFR SERPL CREATININE-BSD FRML MDRD: 70 ML/MIN/1.7M2
GLUCOSE SERPL-MCNC: 119 MG/DL (ref 70–99)
GRAM STN SPEC: ABNORMAL
HCT VFR BLD AUTO: 38.6 % (ref 40–53)
HGB BLD-MCNC: 12.7 G/DL (ref 13.3–17.7)
INR PPP: 1.03 (ref 0.86–1.14)
MCH RBC QN AUTO: 30 PG (ref 26.5–33)
MCHC RBC AUTO-ENTMCNC: 32.9 G/DL (ref 31.5–36.5)
MCV RBC AUTO: 91 FL (ref 78–100)
PLATELET # BLD AUTO: 380 10E9/L (ref 150–450)
POTASSIUM SERPL-SCNC: 4.4 MMOL/L (ref 3.4–5.3)
RBC # BLD AUTO: 4.23 10E12/L (ref 4.4–5.9)
SODIUM SERPL-SCNC: 134 MMOL/L (ref 133–144)
SPECIMEN SOURCE: ABNORMAL
WBC # BLD AUTO: 13.1 10E9/L (ref 4–11)

## 2018-02-26 PROCEDURE — 99233 SBSQ HOSP IP/OBS HIGH 50: CPT | Performed by: INTERNAL MEDICINE

## 2018-02-26 PROCEDURE — 36415 COLL VENOUS BLD VENIPUNCTURE: CPT | Performed by: INTERNAL MEDICINE

## 2018-02-26 PROCEDURE — 12000007 ZZH R&B INTERMEDIATE

## 2018-02-26 PROCEDURE — 87077 CULTURE AEROBIC IDENTIFY: CPT | Performed by: RADIOLOGY

## 2018-02-26 PROCEDURE — 25000128 H RX IP 250 OP 636: Performed by: RADIOLOGY

## 2018-02-26 PROCEDURE — 87076 CULTURE ANAEROBE IDENT EACH: CPT | Performed by: RADIOLOGY

## 2018-02-26 PROCEDURE — 87205 SMEAR GRAM STAIN: CPT | Performed by: RADIOLOGY

## 2018-02-26 PROCEDURE — 80048 BASIC METABOLIC PNL TOTAL CA: CPT | Performed by: INTERNAL MEDICINE

## 2018-02-26 PROCEDURE — 87075 CULTR BACTERIA EXCEPT BLOOD: CPT | Performed by: RADIOLOGY

## 2018-02-26 PROCEDURE — 85027 COMPLETE CBC AUTOMATED: CPT | Performed by: INTERNAL MEDICINE

## 2018-02-26 PROCEDURE — 25000128 H RX IP 250 OP 636

## 2018-02-26 PROCEDURE — 87070 CULTURE OTHR SPECIMN AEROBIC: CPT | Performed by: RADIOLOGY

## 2018-02-26 PROCEDURE — 85610 PROTHROMBIN TIME: CPT | Performed by: RADIOLOGY

## 2018-02-26 PROCEDURE — C1729 CATH, DRAINAGE: HCPCS

## 2018-02-26 PROCEDURE — 87186 SC STD MICRODIL/AGAR DIL: CPT | Performed by: RADIOLOGY

## 2018-02-26 PROCEDURE — 0W9G30Z DRAINAGE OF PERITONEAL CAVITY WITH DRAINAGE DEVICE, PERCUTANEOUS APPROACH: ICD-10-PCS | Performed by: RADIOLOGY

## 2018-02-26 PROCEDURE — 25000125 ZZHC RX 250: Performed by: RADIOLOGY

## 2018-02-26 PROCEDURE — 25000128 H RX IP 250 OP 636: Performed by: INTERNAL MEDICINE

## 2018-02-26 PROCEDURE — 25800025 ZZH RX 258: Performed by: INTERNAL MEDICINE

## 2018-02-26 PROCEDURE — 36415 COLL VENOUS BLD VENIPUNCTURE: CPT | Performed by: RADIOLOGY

## 2018-02-26 RX ORDER — FENTANYL CITRATE 50 UG/ML
50 INJECTION, SOLUTION INTRAMUSCULAR; INTRAVENOUS
Status: DISPENSED | OUTPATIENT
Start: 2018-02-26 | End: 2018-02-27

## 2018-02-26 RX ORDER — FENTANYL CITRATE 50 UG/ML
25 INJECTION, SOLUTION INTRAMUSCULAR; INTRAVENOUS EVERY 5 MIN PRN
Status: ACTIVE | OUTPATIENT
Start: 2018-02-26 | End: 2018-02-27

## 2018-02-26 RX ORDER — NALOXONE HYDROCHLORIDE 0.4 MG/ML
.1-.4 INJECTION, SOLUTION INTRAMUSCULAR; INTRAVENOUS; SUBCUTANEOUS
Status: DISCONTINUED | OUTPATIENT
Start: 2018-02-26 | End: 2018-02-28 | Stop reason: HOSPADM

## 2018-02-26 RX ORDER — FLUMAZENIL 0.1 MG/ML
0.2 INJECTION, SOLUTION INTRAVENOUS
Status: ACTIVE | OUTPATIENT
Start: 2018-02-26 | End: 2018-02-28

## 2018-02-26 RX ORDER — NALOXONE HYDROCHLORIDE 0.4 MG/ML
.1-.4 INJECTION, SOLUTION INTRAMUSCULAR; INTRAVENOUS; SUBCUTANEOUS
Status: ACTIVE | OUTPATIENT
Start: 2018-02-26 | End: 2018-02-28

## 2018-02-26 RX ORDER — FLUMAZENIL 0.1 MG/ML
0.2 INJECTION, SOLUTION INTRAVENOUS
Status: DISCONTINUED | OUTPATIENT
Start: 2018-02-26 | End: 2018-02-28 | Stop reason: HOSPADM

## 2018-02-26 RX ORDER — FENTANYL CITRATE 50 UG/ML
25-50 INJECTION, SOLUTION INTRAMUSCULAR; INTRAVENOUS EVERY 5 MIN PRN
Status: DISCONTINUED | OUTPATIENT
Start: 2018-02-26 | End: 2018-02-28 | Stop reason: HOSPADM

## 2018-02-26 RX ORDER — FENTANYL CITRATE 50 UG/ML
INJECTION, SOLUTION INTRAMUSCULAR; INTRAVENOUS
Status: COMPLETED
Start: 2018-02-26 | End: 2018-02-26

## 2018-02-26 RX ORDER — LIDOCAINE HYDROCHLORIDE 10 MG/ML
1-30 INJECTION, SOLUTION EPIDURAL; INFILTRATION; INTRACAUDAL; PERINEURAL
Status: COMPLETED | OUTPATIENT
Start: 2018-02-26 | End: 2018-02-26

## 2018-02-26 RX ADMIN — MIDAZOLAM 1 MG: 1 INJECTION INTRAMUSCULAR; INTRAVENOUS at 11:35

## 2018-02-26 RX ADMIN — POTASSIUM CHLORIDE, DEXTROSE MONOHYDRATE AND SODIUM CHLORIDE: 150; 5; 450 INJECTION, SOLUTION INTRAVENOUS at 05:19

## 2018-02-26 RX ADMIN — Medication 0.5 MG: at 08:23

## 2018-02-26 RX ADMIN — TAZOBACTAM SODIUM AND PIPERACILLIN SODIUM 3.38 G: 375; 3 INJECTION, SOLUTION INTRAVENOUS at 08:25

## 2018-02-26 RX ADMIN — Medication 0.5 MG: at 02:22

## 2018-02-26 RX ADMIN — Medication 0.5 MG: at 12:59

## 2018-02-26 RX ADMIN — FENTANYL CITRATE 50 MCG: 50 INJECTION INTRAMUSCULAR; INTRAVENOUS at 11:35

## 2018-02-26 RX ADMIN — LIDOCAINE HYDROCHLORIDE 20 MG: 10 INJECTION, SOLUTION EPIDURAL; INFILTRATION; INTRACAUDAL; PERINEURAL at 11:35

## 2018-02-26 RX ADMIN — MIDAZOLAM 1 MG: 1 INJECTION INTRAMUSCULAR; INTRAVENOUS at 11:41

## 2018-02-26 RX ADMIN — TAZOBACTAM SODIUM AND PIPERACILLIN SODIUM 3.38 G: 375; 3 INJECTION, SOLUTION INTRAVENOUS at 20:36

## 2018-02-26 RX ADMIN — Medication 0.5 MG: at 19:14

## 2018-02-26 RX ADMIN — TAZOBACTAM SODIUM AND PIPERACILLIN SODIUM 3.38 G: 375; 3 INJECTION, SOLUTION INTRAVENOUS at 14:54

## 2018-02-26 RX ADMIN — Medication 0.5 MG: at 15:49

## 2018-02-26 RX ADMIN — FENTANYL CITRATE 50 MCG: 50 INJECTION INTRAMUSCULAR; INTRAVENOUS at 11:40

## 2018-02-26 RX ADMIN — Medication 0.5 MG: at 05:25

## 2018-02-26 RX ADMIN — Medication 0.5 MG: at 22:35

## 2018-02-26 RX ADMIN — TAZOBACTAM SODIUM AND PIPERACILLIN SODIUM 3.38 G: 375; 3 INJECTION, SOLUTION INTRAVENOUS at 02:18

## 2018-02-26 ASSESSMENT — ACTIVITIES OF DAILY LIVING (ADL)
ADLS_ACUITY_SCORE: 9

## 2018-02-26 ASSESSMENT — PAIN DESCRIPTION - DESCRIPTORS: DESCRIPTORS: CONSTANT;ACHING

## 2018-02-26 NOTE — PROGRESS NOTES
"COLON & RECTAL SURGERY  PROGRESS NOTE    February 26, 2018    SUBJECTIVE:  Pt reports pain is slightly improving and he has been able to extend dilaudid dose to every 3-4 hours, though he has worsened pain after palpation. He denies nausea. He is urinating well. He continues to pass gas, but denies passing stool.     OBJECTIVE:  Temp:  [97.4  F (36.3  C)-98.7  F (37.1  C)] 98.3  F (36.8  C)  Pulse:  [67-71] 67  Heart Rate:  [68-72] 72  Resp:  [16] 16  BP: (124-137)/(73-80) 127/73  SpO2:  [93 %-97 %] 93 %    Intake/Output Summary (Last 24 hours) at 02/26/18 0757  Last data filed at 02/26/18 0639   Gross per 24 hour   Intake             1053 ml   Output                0 ml   Net             1053 ml       GENERAL:  Awake, alert, no acute distress, lying in bed.   HEAD: Nomocephalic atraumatic  SCLERA: anicteric  EXTREMITIES: warm and well perfused  ABDOMEN:  Soft, minimal LLQ tenderness to palpation, non-distended, no rebound or guarding, no peritoneal signs    LABS:  Lab Results   Component Value Date    WBC 13.1 02/26/2018     Lab Results   Component Value Date    HGB 12.7 02/26/2018     Lab Results   Component Value Date    HCT 38.6 02/26/2018     Lab Results   Component Value Date     02/26/2018     Last Basic Metabolic Panel:  Lab Results   Component Value Date     02/26/2018      Lab Results   Component Value Date    POTASSIUM 4.4 02/26/2018     Lab Results   Component Value Date    CHLORIDE 101 02/26/2018     Lab Results   Component Value Date    LEYDI 8.9 02/26/2018     Lab Results   Component Value Date    CO2 29 02/26/2018     Lab Results   Component Value Date    BUN 8 02/26/2018     Lab Results   Component Value Date    CR 1.06 02/26/2018     Lab Results   Component Value Date     02/26/2018       ASSESSMENT/PLAN: 61 yo man with 1st attack of diverticulitis with CT scan showing \"ill defined fluid and stranding\" next to sigmoid colon of drainable abscess vs inflammation.     Pt remains " afebrile, VSS. Luekocytosis improving 17.6 to 13.1.     1. Continue NPO, discussed with IR whom will attempt drainage this morning.   2. Continue antibiotics  3. Continue IVF  4. Encourage OOB, ambulate    Lissette Ibanez PA-C  [facility]  Phone: [phone]     Colon and Rectal Surgery Attending Note    Patient seen and examined independently.  Agree with above assessment and plan.  Seen at 1250  Just returned from IR drainage  abd soft, Drain with pus  Plan   Clears  IV abx  Drain for now.  Hopeful to avoid urgent surgery but will monitor closely.    Babs Shabazz MD  [facility]  Office Phone # [phone]

## 2018-02-26 NOTE — PROGRESS NOTES
[facility]    Hospitalist Progress Note    Date of Service (when I saw the patient): 02/26/2018    Assessment & Plan   Brief summary of admission assessment: Lennox Acuna is a 62 year old  male with a significant past medical history of no significant medical problems  who presents with abdominal pain. ED evaluation revealed CT evidence of acute diverticulitis. Patient's presentation is consistent with acute complicated diverticulitis and will be admitted for further inpatient management to care of Hospitalist service.       Admission diagnoses:    1.   Acute mid sigmoid diverticulitis with possible abscess formation without perforation: Will continue IV Zosyn. Colorectal surgery discussed with IR to attempt drainage.will keep NPO     2.  Abdominal pain secondary to #1: improving. Continue pain medications as needed     3.  Hypertensive urgency with blood pressure over 200 when he first presented: BP improved.      4.  Leukocytosis with left shift, normal lactic acid: wbc trending down.      Pain Assessment:   Current Pain Score 2/26/2018 2/26/2018 2/26/2018   Patient currently in pain? yes sleeping: patient not able to self report yes   Pain score (0-10) 8 - 7   Pain location Abdomen - Abdomen   Pain descriptors Constant;Aching - -   Lennox henry pain level was assessed and he currently denies pain.      DVT Prophylaxis: Pneumatic Compression Devices    Code Status: Full Code    Disposition: Expected discharge: anticipate at least two nights of hospital course.     Marixa Trujillo MD    Interval History   Patient seen and examined. He stated that he is feeling better pain improving. He has no fever    -Data reviewed today: I reviewed all new labs and imaging results over the last 24 hours. I personally reviewed     Physical Exam   Temp: 99.4  F (37.4  C) Temp src: Oral BP: 132/82 Pulse: 67 Heart Rate: 77 Resp: 16 SpO2: 96 % O2 Device: None (Room air)    Vitals:    02/25/18 0325   Weight: 86.3  kg (190 lb 3.2 oz)     Vital Signs with Ranges  Temp:  [98.3  F (36.8  C)-99.4  F (37.4  C)] 99.4  F (37.4  C)  Pulse:  [67] 67  Heart Rate:  [68-77] 77  Resp:  [16] 16  BP: (124-137)/(73-82) 132/82  SpO2:  [93 %-97 %] 96 %  I/O last 3 completed shifts:  In: 1053 [I.V.:1053]  Out: -     GEN:  Alert, oriented x 3, appears comfortable, NAD.  HEENT:  Normocephalic/atraumatic, no scleral icterus, no nasal discharge, mouth moist.  CV:  Regular rate and rhythm, no murmur or JVD.  S1 + S2 noted, no S3 or S4.  LUNGS:  Clear to auscultation bilaterally without rales/rhonchi/wheezing/retractions.  Symmetric chest rise on inhalation noted.  ABD:  Active bowel sounds, soft, non-tender/non-distended.  No rebound/guarding/rigidity.  EXT:  No edema or cyanosis.  Hands/feet warm to touch with good signs of peripheral perfusion.  No joint synovitis noted.  SKIN:  Dry to touch, no exanthems noted in the visualized areas.  NEURO:  Symmetric muscle strength, sensation to touch grossly intact.  No new focal deficits appreciated.    Medications     dextrose 5% and 0.45% NaCl + KCl 20 mEq/L 125 mL/hr at 02/26/18 0519       piperacillin-tazobactam  3.375 g Intravenous Q6H       Data     Recent Labs  Lab 02/26/18  1002 02/26/18  0659 02/24/18  2353   WBC  --  13.1* 17.6*   HGB  --  12.7* 14.1   MCV  --  91 91   PLT  --  380 443   INR 1.03  --   --    NA  --  134 138   POTASSIUM  --  4.4 3.9   CHLORIDE  --  101 105   CO2  --  29 25   BUN  --  8 18   CR  --  1.06 0.96   ANIONGAP  --  4 8   LEYDI  --  8.9 9.1   GLC  --  119* 122*   ALBUMIN  --   --  2.8*   PROTTOTAL  --   --  7.5   BILITOTAL  --   --  0.2   ALKPHOS  --   --  169*   ALT  --   --  23   AST  --   --  16   LIPASE  --   --  154       No results found for this or any previous visit (from the past 24 hour(s)).

## 2018-02-26 NOTE — PROCEDURES
RADIOLOGY PROCEDURE NOTE  Patient name: Lennox Acuna  MRN: [id number]  : 1955    Pre-procedure diagnosis: abdominal abscess.   Post-procedure diagnosis: Same    Procedure Date/Time: 2018  11:43 AM  Procedure: ct guided abdominal abscess drain placement.   Estimated blood loss: None  Specimen(s) collected with description: 5 mL purulent fluid sent to lab.   The patient tolerated the procedure well with no immediate complications.  Significant findings:none    See imaging dictation for procedural details.    Provider name: Stephanie Pate  Assistant(s):None

## 2018-02-26 NOTE — PLAN OF CARE
Assumed care at 1530.  No change.  Dilaudid for pain, up independently, NPO. Colorectal following.

## 2018-02-26 NOTE — PLAN OF CARE
Problem: Patient Care Overview  Goal: Plan of Care/Patient Progress Review  Outcome: No Change    Dx: Diverticulitis   Hx: No Significant PMH  Tele: NA  Assessment: VSS. A/Ox4. Denies SOB/CP. Pain w/ rating as high as 8/10, reduced w/ IV Dilaudid x 2. PIV infusing D5 1/2 NS w/ 20K @ 125 mL/hr. Independent. NPO.   Plan: Continue POC. D/C after further evaluation/treatment. Interventional Radiology seeing pt in AM for possible drain placement.

## 2018-02-26 NOTE — PLAN OF CARE
Problem: Infection, Risk/Actual (Adult)  Goal: Identify Related Risk Factors and Signs and Symptoms  Related risk factors and signs and symptoms are identified upon initiation of [employer] Clinical Practice Guideline (CPG).  Outcome: No Change  VSS on RA. Pt resting in bed between cares, up ad efren in room. C/O abd pain 6/10, prn dilaudid given with rest after administration. PIV infusing IVF at 125 ml/hr. IV abx given as scheduled. NPO with ice, denies n/v. Voiding, denies difficulty. Alert and oriented, able to make needs known. Continue to monitor.

## 2018-02-26 NOTE — TELEPHONE ENCOUNTER
Wife called to say that Lennox went to the ER and is in the hospital for diverticulitis with abscess. He should be out in a couple days.    Can close once noted

## 2018-02-26 NOTE — PROGRESS NOTES
Abscess drain 10 Danish placed per Dr. Pate without difficulty, patient tolerated well. Fentanyl 100 mcg and versed 2 mg total given. 20 cc pus aspirated out and sent to lab for cultures/gram stain per Dr. Pate. See also post procedure orders and imaging dictation.

## 2018-02-26 NOTE — PLAN OF CARE
Problem: Patient Care Overview  Goal: Plan of Care/Patient Progress Review  Outcome: No Change  /82 (BP Location: Left arm)  Pulse 67  Temp 99.4  F (37.4  C) (Oral)  Resp 16  Wt 86.3 kg (190 lb 3.2 oz)  SpO2 96%  BMI 30.01 kg/m2  Neuro: A&O x4  Pain: c/o moderate pain in lower abdomen  Resp: LS clear, on RA, denies SOB  Cardiac: WDL  GI/: bowel sounds positive, passing gas, voiding appropriately   Diet: tolerating clear liquid diet  Skin/mobility: intact, JACKSON drain placed in LLQ, up independently in room  Tx/plan:  IV zosyn, JACKSON to drain abscess, colorectal surgery following    Will continue to monitor and provide supportive care.

## 2018-02-26 NOTE — PLAN OF CARE
Problem: Patient Care Overview  Goal: Plan of Care/Patient Progress Review  Orientation: A/O x4, calls as needed  VS stable, afebrile, denies pain for this RN but receiving IIV dilaudid periodically for lower abd pain-goal is 5/10   LS: clear and equal  GI: - Flatus - BM. Denies N/V. BS active  : Adequate urine output.   Diet: NPO for procedure in AM  PIV: 0.45% NS with 20mEq of K+ at 125ml.hr  Antibiotics: IV Zosyn Q6H  Activity: Independent with movbility. Pt slept comfortably throughout shift apart when transferred to different room. Settled comfortably.  Disposition: Expected discharge TBD

## 2018-02-27 LAB
ANION GAP SERPL CALCULATED.3IONS-SCNC: 5 MMOL/L (ref 3–14)
BASOPHILS # BLD AUTO: 0 10E9/L (ref 0–0.2)
BASOPHILS NFR BLD AUTO: 0.4 %
BUN SERPL-MCNC: 8 MG/DL (ref 7–30)
CALCIUM SERPL-MCNC: 9 MG/DL (ref 8.5–10.1)
CHLORIDE SERPL-SCNC: 105 MMOL/L (ref 94–109)
CO2 SERPL-SCNC: 26 MMOL/L (ref 20–32)
CREAT SERPL-MCNC: 1.03 MG/DL (ref 0.66–1.25)
DIFFERENTIAL METHOD BLD: ABNORMAL
EOSINOPHIL # BLD AUTO: 0.3 10E9/L (ref 0–0.7)
EOSINOPHIL NFR BLD AUTO: 2.8 %
ERYTHROCYTE [DISTWIDTH] IN BLOOD BY AUTOMATED COUNT: 12 % (ref 10–15)
GFR SERPL CREATININE-BSD FRML MDRD: 72 ML/MIN/1.7M2
GLUCOSE SERPL-MCNC: 109 MG/DL (ref 70–99)
HCT VFR BLD AUTO: 39.4 % (ref 40–53)
HGB BLD-MCNC: 12.6 G/DL (ref 13.3–17.7)
IMM GRANULOCYTES # BLD: 0 10E9/L (ref 0–0.4)
IMM GRANULOCYTES NFR BLD: 0.3 %
LYMPHOCYTES # BLD AUTO: 1.5 10E9/L (ref 0.8–5.3)
LYMPHOCYTES NFR BLD AUTO: 15.7 %
MCH RBC QN AUTO: 29.9 PG (ref 26.5–33)
MCHC RBC AUTO-ENTMCNC: 32 G/DL (ref 31.5–36.5)
MCV RBC AUTO: 93 FL (ref 78–100)
MONOCYTES # BLD AUTO: 0.8 10E9/L (ref 0–1.3)
MONOCYTES NFR BLD AUTO: 8.4 %
NEUTROPHILS # BLD AUTO: 6.9 10E9/L (ref 1.6–8.3)
NEUTROPHILS NFR BLD AUTO: 72.4 %
NRBC # BLD AUTO: 0 10*3/UL
NRBC BLD AUTO-RTO: 0 /100
PLATELET # BLD AUTO: 398 10E9/L (ref 150–450)
POTASSIUM SERPL-SCNC: 4.4 MMOL/L (ref 3.4–5.3)
RBC # BLD AUTO: 4.22 10E12/L (ref 4.4–5.9)
SODIUM SERPL-SCNC: 136 MMOL/L (ref 133–144)
WBC # BLD AUTO: 9.5 10E9/L (ref 4–11)

## 2018-02-27 PROCEDURE — 25000132 ZZH RX MED GY IP 250 OP 250 PS 637: Performed by: STUDENT IN AN ORGANIZED HEALTH CARE EDUCATION/TRAINING PROGRAM

## 2018-02-27 PROCEDURE — 36415 COLL VENOUS BLD VENIPUNCTURE: CPT | Performed by: INTERNAL MEDICINE

## 2018-02-27 PROCEDURE — 80048 BASIC METABOLIC PNL TOTAL CA: CPT | Performed by: INTERNAL MEDICINE

## 2018-02-27 PROCEDURE — 85025 COMPLETE CBC W/AUTO DIFF WBC: CPT | Performed by: INTERNAL MEDICINE

## 2018-02-27 PROCEDURE — 25800025 ZZH RX 258: Performed by: PHYSICIAN ASSISTANT

## 2018-02-27 PROCEDURE — 25000128 H RX IP 250 OP 636: Performed by: INTERNAL MEDICINE

## 2018-02-27 PROCEDURE — 12000007 ZZH R&B INTERMEDIATE

## 2018-02-27 PROCEDURE — 99233 SBSQ HOSP IP/OBS HIGH 50: CPT | Performed by: INTERNAL MEDICINE

## 2018-02-27 RX ORDER — AMOXICILLIN AND CLAVULANATE POTASSIUM 562.5; 437.5; 62.5 MG/1; MG/1; MG/1
2 TABLET, MULTILAYER, EXTENDED RELEASE ORAL EVERY 12 HOURS SCHEDULED
Status: DISCONTINUED | OUTPATIENT
Start: 2018-02-27 | End: 2018-02-28 | Stop reason: HOSPADM

## 2018-02-27 RX ORDER — ACETAMINOPHEN 325 MG/1
650 TABLET ORAL EVERY 4 HOURS PRN
Status: DISCONTINUED | OUTPATIENT
Start: 2018-02-27 | End: 2018-02-28 | Stop reason: HOSPADM

## 2018-02-27 RX ADMIN — Medication 0.5 MG: at 07:06

## 2018-02-27 RX ADMIN — AMOXICILLIN AND CLAVULANATE POTASSIUM 2 TABLET: 562.5; 437.5; 62.5 TABLET, MULTILAYER, EXTENDED RELEASE ORAL at 20:03

## 2018-02-27 RX ADMIN — TAZOBACTAM SODIUM AND PIPERACILLIN SODIUM 3.38 G: 375; 3 INJECTION, SOLUTION INTRAVENOUS at 08:11

## 2018-02-27 RX ADMIN — POTASSIUM CHLORIDE, DEXTROSE MONOHYDRATE AND SODIUM CHLORIDE: 150; 5; 450 INJECTION, SOLUTION INTRAVENOUS at 05:11

## 2018-02-27 RX ADMIN — TAZOBACTAM SODIUM AND PIPERACILLIN SODIUM 3.38 G: 375; 3 INJECTION, SOLUTION INTRAVENOUS at 01:35

## 2018-02-27 RX ADMIN — AMOXICILLIN AND CLAVULANATE POTASSIUM 2 TABLET: 562.5; 437.5; 62.5 TABLET, MULTILAYER, EXTENDED RELEASE ORAL at 11:33

## 2018-02-27 RX ADMIN — Medication 0.5 MG: at 01:39

## 2018-02-27 ASSESSMENT — ACTIVITIES OF DAILY LIVING (ADL)
ADLS_ACUITY_SCORE: 9

## 2018-02-27 NOTE — PROGRESS NOTES
[facility]    Hospitalist Progress Note    Date of Service (when I saw the patient): 02/27/2018    Assessment & Plan   Brief summary of admission assessment: Lennox Acuna is a 62 year old  male with a significant past medical history of no significant medical problems  who presents with abdominal pain. ED evaluation revealed CT evidence of acute diverticulitis. Patient's presentation is consistent with acute complicated diverticulitis and will be admitted for further inpatient management to care of Hospitalist service.       Admission diagnoses:    1.   Acute mid sigmoid diverticulitis with possible abscess formation without perforation:   --20 cc pus aspirated out in interventional radiology on 2/26.  Culture pending.  --Received IV Zosyn, day #4.  Antibiotic changed to oral Augmentin today.    --Diet and advance it to low fiber diet per colorectal surgery.  Appreciate CRS input.       2.  Abdominal pain secondary to #1: improving. Continue pain medications as needed     3.  Hypertensive urgency with blood pressure over 200 when he first presented: BP improved.      4.  Leukocytosis with left shift, normal lactic acid: Improved with antibiotics.      Pain Assessment:   Current Pain Score 2/27/2018 2/27/2018 2/27/2018   Patient currently in pain? - - sleeping: patient not able to self report   Pain score (0-10) 7 8 -   Pain location - - -   Pain descriptors Aching - -   Lennox henry pain level was assessed and he currently denies pain.      DVT Prophylaxis: Pneumatic Compression Devices    Code Status: Full Code    Disposition: Expected discharge: Anticipate discharge tomorrow if he continues to improve    Marixa Trujillo MD    Interval History   Patient seen and examined.  He stated that abdominal pain is improving.  He denies fever, nausea, vomiting, diarrhea    -Data reviewed today: I reviewed all new labs and imaging results over the last 24 hours. I personally reviewed     Physical Exam    Temp: 97.6  F (36.4  C) Temp src: Oral BP: 133/77   Heart Rate: 69 Resp: 16 SpO2: 96 % O2 Device: None (Room air)    Vitals:    02/25/18 0325 02/27/18 0659   Weight: 86.3 kg (190 lb 3.2 oz) 86.3 kg (190 lb 4.8 oz)     Vital Signs with Ranges  Temp:  [97.6  F (36.4  C)-99.1  F (37.3  C)] 97.6  F (36.4  C)  Heart Rate:  [69-77] 69  Resp:  [16] 16  BP: (133-150)/(77-83) 133/77  SpO2:  [95 %-96 %] 96 %  I/O last 3 completed shifts:  In: 1993 [I.V.:1973; Other:20]  Out: 80 [Drains:80]    GEN:  Alert, oriented x 3, appears comfortable, NAD.  HEENT:  Normocephalic/atraumatic, no scleral icterus, no nasal discharge, mouth moist.  CV:  Regular rate and rhythm, no murmur or JVD.  S1 + S2 noted, no S3 or S4.  LUNGS:  Clear to auscultation bilaterally without rales/rhonchi/wheezing/retractions.  Symmetric chest rise on inhalation noted.  ABD:  Active bowel sounds, soft, mild tenderness in the left abdomen.  No rebound/guarding/rigidity.  EXT:  No edema or cyanosis.  Hands/feet warm to touch with good signs of peripheral perfusion.  No joint synovitis noted.  SKIN:  Dry to touch, no exanthems noted in the visualized areas.  NEURO:  Symmetric muscle strength, sensation to touch grossly intact.  No new focal deficits appreciated.    Medications     dextrose 5% and 0.45% NaCl + KCl 20 mEq/L 50 mL/hr at 02/27/18 1001       amoxicillin-clavulanate  2 tablet Oral Q12H CORA     midazolam  2 mg Intravenous Once within 24 hrs     fentaNYL  50 mcg Intravenous Once within 24 hrs     sodium chloride (PF)  10 mL Irrigation Q8H       Data     Recent Labs  Lab 02/27/18  0807 02/26/18  1002 02/26/18  0659 02/24/18  2353   WBC 9.5  --  13.1* 17.6*   HGB 12.6*  --  12.7* 14.1   MCV 93  --  91 91     --  380 443   INR  --  1.03  --   --      --  134 138   POTASSIUM 4.4  --  4.4 3.9   CHLORIDE 105  --  101 105   CO2 26  --  29 25   BUN 8  --  8 18   CR 1.03  --  1.06 0.96   ANIONGAP 5  --  4 8   LEYDI 9.0  --  8.9 9.1   *  --   119* 122*   ALBUMIN  --   --   --  2.8*   PROTTOTAL  --   --   --  7.5   BILITOTAL  --   --   --  0.2   ALKPHOS  --   --   --  169*   ALT  --   --   --  23   AST  --   --   --  16   LIPASE  --   --   --  154       Recent Results (from the past 24 hour(s))   CT Abdomen Peritonium Abscess Drainage    Narrative    CT GUIDED ABDOMINAL ABSCESS DRAIN  2/26/2018 11:52 AM    MEDICATIONS: 10 mL 1% lidocaine, 2 mg Versed IV, 100 mcg fentanyl IV.  Throughout the procedure, the patient was monitored by a radiology  nurse for cardiac rhythm and oxygen saturation which remained stable.  Total sedation time was 30 minutes.    CONTRAST: None.    COMPLICATIONS: None.    HISTORY: 62-year-old male with abdominal/pelvic abscess.    PROCEDURE AND FINDINGS:  Following a discussion of the risks,  benefits, indications and alternatives to treatment, appropriate  informed consent was obtained.  The patient was brought to the  interventional radiology suite and placed supine on the table. The  left lower quadrant was prepped and draped in a sterile fashion.  A  time out was performed per universal protocol policy to ensure correct  patient, site and procedure to be performed. Radiation dose for this  scan was reduced using automated exposure control, adjustment of the  mA and/or kV according to patient size, or iterative reconstruction  technique.     5 mm unenhanced images were obtained through the region of the  abdomen/pelvis, images showed a fluid collection in the mid  abdomen/pelvis. A suitable route for percutaneous drainage was then  chosen. The overlying skin was prepped and draped in a sterile manner.  1% lidocaine was used for local anesthesia. Under CT guidance, 5  English Yueh needle was advanced to the abscess. The catheter was  advanced off of the needle. 5 mL of purulent fluid was aspirated and  sent to lab for culture. A 0.035 wire was advanced through the  catheter and exchange was made for a 10 English pigtail drain.  The  pigtail was locked and connected to JACKSON bulb. The drain was sutured in  with silk suture. A sterile dressing was applied.     Throughout the procedure, the patient was monitored by a radiology  nurse for cardiac rhythm and oxygen saturation which remained stable.  Total sedation time was 14 minutes. The patient tolerated the  procedure well and left interventional radiology in stable condition.      Impression    IMPRESSION: CT-guided abdominal/pelvic abscess drain placement.    RAFAEL SNELL DO

## 2018-02-27 NOTE — PLAN OF CARE
Problem: Patient Care Overview  Goal: Plan of Care/Patient Progress Review  Outcome: No Change  Pt  A&Ox4, up ind in room.  C/o pain in lower left abdominal, 8-10 requiring PRN IV dilaudid every 2-3 hours.  JACKSON is draining adequate amount serosanguinous fluid.   D51/2 w/20K infusing at 100 an hour.  Pt tolerating clear liquids.  Pt will make needs known, will continue POC.

## 2018-02-27 NOTE — PROGRESS NOTES
COLON & RECTAL SURGERY  PROGRESS NOTE    February 27, 2018      SUBJECTIVE:  Pain has improved quite a bit since IR drainage.  Patient currently feeling comfortable.  He does not have any nausea or vomiting.  Passing flatus.      OBJECTIVE:  Temp:  [97.6  F (36.4  C)-99.1  F (37.3  C)] 97.6  F (36.4  C)  Heart Rate:  [69-77] 69  Resp:  [16] 16  BP: (133-150)/(77-83) 133/77  SpO2:  [95 %-96 %] 96 %    Intake/Output Summary (Last 24 hours) at 02/27/18 0910  Last data filed at 02/27/18 0515   Gross per 24 hour   Intake             1993 ml   Output               80 ml   Net             1913 ml       GENERAL:  Awake, alert, no acute distress  ABDOMEN:  Soft, only mildly tender in the LLQ  INCISION:  C/d/i  Perc drain 80 cc.     LABS:  Lab Results   Component Value Date    WBC 9.5 02/27/2018     Lab Results   Component Value Date    HGB 12.6 02/27/2018     Lab Results   Component Value Date    HCT 39.4 02/27/2018     Lab Results   Component Value Date     02/27/2018     Last Basic Metabolic Panel:  Lab Results   Component Value Date     02/27/2018      Lab Results   Component Value Date    POTASSIUM 4.4 02/27/2018     Lab Results   Component Value Date    CHLORIDE 105 02/27/2018     Lab Results   Component Value Date    LEYDI 9.0 02/27/2018     Lab Results   Component Value Date    CO2 26 02/27/2018     Lab Results   Component Value Date    BUN 8 02/27/2018     Lab Results   Component Value Date    CR 1.03 02/27/2018     Lab Results   Component Value Date     02/27/2018       ASSESSMENT/PLAN: 62M with 1st diverticulitis episode.  Had perc drain inserted by IR yesterday.  Since then copious amount of purulent drianage through the drain.  His WBC has returned to normal today.   1) Advance to solids today  2) Transition to PO abx   3) Dec IVF  4) Encourage OOB, ambulate  5) Plan to dc tmr if continues to do well.     Patient will be discussed with Dr. Shabazz.    Bin Monroe, Colorectal Fellow  [facility]  Phone: [phone]      Colon and Rectal Surgery Attending Note    Patient seen and examined independently.  Agree with above assessment and plan.  Feeling much better with the drain, minimal pain. No nausea, + BM and flatus  abd soft, stoma with pus  Plan as above  Drain care and teaching.  Hopeful for DC tomorrow.  Will plan for out patient CT sinogram in 1-2 weeks depending on the output  Will need a colonoscopy once in 6-8 weeks.    Babs Shabazz MD  [facility]  Office Phone # [phone]

## 2018-02-27 NOTE — PLAN OF CARE
Problem: Patient Care Overview  Goal: Plan of Care/Patient Progress Review  Outcome: Improving  A&Ox4. VSS. IV fluids D5 1/2 NaCl + 20 mEq KCl running at 50 mL/hr. Diet advanced to low fiber. JACKSON drain irrigated 10 ml. Pt denies pain except for when touching wound site - declines intervention. Abx - augmentin PO. Tylenol to be given PO for pain. Up independently. Possible D/C tomorrow.

## 2018-02-27 NOTE — PLAN OF CARE
Problem: Patient Care Overview  Goal: Plan of Care/Patient Progress Review  Outcome: Improving  Pt up in room, clear liquid diet and tolerating well, chicken broth given overnight and water.  Pt c/o pain at nelly site, prn dilaudid given and relief.  Fluids running, new iv started as previous infiltrated.  A/O x4.

## 2018-02-28 VITALS
TEMPERATURE: 98.2 F | OXYGEN SATURATION: 95 % | RESPIRATION RATE: 20 BRPM | BODY MASS INDEX: 29.6 KG/M2 | WEIGHT: 187.6 LBS | HEART RATE: 72 BPM | DIASTOLIC BLOOD PRESSURE: 81 MMHG | SYSTOLIC BLOOD PRESSURE: 136 MMHG

## 2018-02-28 LAB
BASOPHILS # BLD AUTO: 0.1 10E9/L (ref 0–0.2)
BASOPHILS NFR BLD AUTO: 0.5 %
DIFFERENTIAL METHOD BLD: ABNORMAL
EOSINOPHIL # BLD AUTO: 0.3 10E9/L (ref 0–0.7)
EOSINOPHIL NFR BLD AUTO: 3.5 %
ERYTHROCYTE [DISTWIDTH] IN BLOOD BY AUTOMATED COUNT: 11.9 % (ref 10–15)
HCT VFR BLD AUTO: 42.9 % (ref 40–53)
HGB BLD-MCNC: 14.1 G/DL (ref 13.3–17.7)
IMM GRANULOCYTES # BLD: 0.1 10E9/L (ref 0–0.4)
IMM GRANULOCYTES NFR BLD: 1 %
LYMPHOCYTES # BLD AUTO: 1.7 10E9/L (ref 0.8–5.3)
LYMPHOCYTES NFR BLD AUTO: 18.1 %
MCH RBC QN AUTO: 29.8 PG (ref 26.5–33)
MCHC RBC AUTO-ENTMCNC: 32.9 G/DL (ref 31.5–36.5)
MCV RBC AUTO: 91 FL (ref 78–100)
MONOCYTES # BLD AUTO: 0.7 10E9/L (ref 0–1.3)
MONOCYTES NFR BLD AUTO: 7.8 %
NEUTROPHILS # BLD AUTO: 6.5 10E9/L (ref 1.6–8.3)
NEUTROPHILS NFR BLD AUTO: 69.1 %
NRBC # BLD AUTO: 0 10*3/UL
NRBC BLD AUTO-RTO: 0 /100
PLATELET # BLD AUTO: 468 10E9/L (ref 150–450)
RBC # BLD AUTO: 4.73 10E12/L (ref 4.4–5.9)
WBC # BLD AUTO: 9.4 10E9/L (ref 4–11)

## 2018-02-28 PROCEDURE — 25000132 ZZH RX MED GY IP 250 OP 250 PS 637: Performed by: STUDENT IN AN ORGANIZED HEALTH CARE EDUCATION/TRAINING PROGRAM

## 2018-02-28 PROCEDURE — 99239 HOSP IP/OBS DSCHRG MGMT >30: CPT | Performed by: INTERNAL MEDICINE

## 2018-02-28 PROCEDURE — 80048 BASIC METABOLIC PNL TOTAL CA: CPT | Performed by: INTERNAL MEDICINE

## 2018-02-28 RX ORDER — AMOXICILLIN AND CLAVULANATE POTASSIUM 562.5; 437.5; 62.5 MG/1; MG/1; MG/1
2 TABLET, MULTILAYER, EXTENDED RELEASE ORAL EVERY 12 HOURS
Qty: 20 TABLET | Refills: 0 | Status: SHIPPED | OUTPATIENT
Start: 2018-02-28 | End: 2018-03-10

## 2018-02-28 RX ADMIN — AMOXICILLIN AND CLAVULANATE POTASSIUM 2 TABLET: 562.5; 437.5; 62.5 TABLET, MULTILAYER, EXTENDED RELEASE ORAL at 07:46

## 2018-02-28 ASSESSMENT — ACTIVITIES OF DAILY LIVING (ADL)
ADLS_ACUITY_SCORE: 9

## 2018-02-28 NOTE — PROGRESS NOTES
COLON & RECTAL SURGERY  PROGRESS NOTE    February 28, 2018    SUBJECTIVE:  Pt feeling well this morning. He denies abdominal pain or nausea. Urinating without difficulty. Ambulating often.     OBJECTIVE:  Temp:  [97.3  F (36.3  C)-99.8  F (37.7  C)] 98.2  F (36.8  C)  Pulse:  [72] 72  Heart Rate:  [68-78] 72  Resp:  [16-20] 20  BP: (136-150)/(81-92) 136/81  SpO2:  [95 %-97 %] 95 %    Intake/Output Summary (Last 24 hours) at 02/28/18 0750  Last data filed at 02/28/18 0500   Gross per 24 hour   Intake             1893 ml   Output             27.5 ml   Net           1865.5 ml       GENERAL:  Awake, alert, no acute distress, sitting in bed  HEAD: Nomocephalic atraumatic  SCLERA: anicteric  EXTREMITIES: warm and well perfused  ABDOMEN:  Soft, non tender, non-distended, no rebound or guarding, no peritoneal signs. IR drain with purulent and sanguinous output.     LABS:  Lab Results   Component Value Date    WBC 9.5 02/27/2018     Lab Results   Component Value Date    HGB 12.6 02/27/2018     Lab Results   Component Value Date    HCT 39.4 02/27/2018     Lab Results   Component Value Date     02/27/2018     Last Basic Metabolic Panel:  Lab Results   Component Value Date     02/27/2018      Lab Results   Component Value Date    POTASSIUM 4.4 02/27/2018     Lab Results   Component Value Date    CHLORIDE 105 02/27/2018     Lab Results   Component Value Date    LEYDI 9.0 02/27/2018     Lab Results   Component Value Date    CO2 26 02/27/2018     Lab Results   Component Value Date    BUN 8 02/27/2018     Lab Results   Component Value Date    CR 1.03 02/27/2018     Lab Results   Component Value Date     02/27/2018       ASSESSMENT/PLAN: 61 yo man with 1st diverticulitis episode with abscess, s/p IR drainage 2/26 with purulent drainage from drain. Pt remains afebrile, VSS.     1. Continue low fiber diet  2. Continue PO antibiotics  3. Encourage OOB, ambulate  4. Drain care and teaching  5. Dispo: okay for d/c home  today, will plan for outpatient CT sinogram in 1-2 weeks and colonoscopy in 6-8 weeks, our office will work to schedule this.      Lissette Ibanez PA-C  [facility]  Phone: [phone]     Colon and Rectal Surgery Attending Note    Patient seen and examined independently.  Agree with above assessment and plan.  Feeling much better, good UOP, no nausea or vomiting.  abd soft, JACKSON with pus  Plan as above  Okay for DC from surgery point of view.    Babs Shabazz MD  [facility]  Office Phone # [phone]

## 2018-02-28 NOTE — PLAN OF CARE
Problem: Patient Care Overview  Goal: Plan of Care/Patient Progress Review  Outcome: Adequate for Discharge Date Met: 02/28/18  Pt discharge to home, prescription medication handed to patient and wife, questions answered, left facility at 1540.

## 2018-02-28 NOTE — PLAN OF CARE
Problem: Patient Care Overview  Goal: Plan of Care/Patient Progress Review  Outcome: Improving  Pt ready for discharge. Discharge pharmacy to send order for Augmenting and Saline Flushes. Pt understands and agrees to discharge plans.  Pt has all belongings and is waiting in room with spouse.

## 2018-02-28 NOTE — DISCHARGE INSTRUCTIONS
Discharge Instructions: Caring for Your Hitesh-Bennett Drainage Tube  Your doctor discharges you with a Hitehs-Bennett drainage tube. Doctors commonly leave this drain within the abdominal cavity after surgery. It helps drain and collect blood and body fluid after surgery. This can prevent swelling and reduces the risk for infection. The tube is held in place by a few stitches. It is covered with a bandage. Your doctor will remove the drain when he or she determines you no longer need it.  Home care    Don t sleep on the same side as the tube.    Secure the tube and bag inside your clothing with a safety pin. This helps keep the tube from being pulled out.    Empty your drain at least twice a day. Empty it more often if the drain is full. Wash  and dry your hands before emptying the drain.    Lift the opening on the drain.    Drain the fluid into a measuring cup.    Record the amount of fluid each time you empty the drain. Include the date and time it was emptied. Share this information with your doctor on your next visit.    Squeeze the bulb with your hands until you hear air coming out of the bulb if your doctor has instructed you to do so (sometimes the bulb is used as a reservoir without suction). Check with your doctor about specific drain instructions.    Close the opening.    Change the dressing around the tube every day.    Wash your hands.    Remove the old bandage.    Wash your hands again.    Wet a cotton swab and clean the skin around the incision and tube site. Use normal saline solution (salt and water). Or, you can use warm, soapy water.    Put a new bandage on the incision and tube site. Make the bandage large enough to cover the whole incision area.    Tape the bandage in place.    Keep the bandage and tube site dry when you shower. Ask your healthcare provider about the best way to do this.     Stripping  the tube helps keep blood clots from blocking the tube. Ask your nurse how often you should  strip the tube. Stripping may not be needed, depending on where and why your doctor placed the tube. It may even be dangerous in some cases.     Hold the tubing where it leaves the skin, with one hand. This keeps it from pulling on the skin.    Pinch the tubing with the thumb and first finger of your other hand.    Slowly and firmly pull your thumb and first finger down the tubing. You may find it helpful to hold an alcohol swab between your fingers and the tube to lubricate the tubing.    If the pulling hurts or feels like it s coming out of the skin, stop. Begin again more gently.  Follow-up care  Make a follow-up appointment as directed by our staff.     When to seek medical care  Call your healthcare provider right away if you have any of the following:    New or increased pain around the tube    Redness, swelling, or warmth around the incision or tube    Drainage that is foul-smelling    Vomiting    Fever of 100.4 F (38 C)    Fluid leaking around the tube    Incision seems not to be healing    Stitches become loose    Tube falls out or breaks    Drainage that changes from light pink to dark red    Blood clots in the drainage bulb    A sudden increase or decrease in the amount of drainage (over 30 mL)   Date Last Reviewed: 2/1/2017 2000-2017 [employer]. [address] [place] [address], [place] [address]. All rights reserved. This information is not intended as a substitute for professional medical care. Always follow your healthcare professional's instructions.

## 2018-02-28 NOTE — PLAN OF CARE
Problem: Patient Care Overview  Goal: Plan of Care/Patient Progress Review  Outcome: Improving  Pt denied pain, slept well, fluids infusing at 50, water available at bedside, nelly bulb flushed with 10ml and output after flush was 15.

## 2018-02-28 NOTE — PLAN OF CARE
Problem: Patient Care Overview  Goal: Plan of Care/Patient Progress Review  Outcome: Improving  Denies pain, VS stable,independent in room. JACKSON left lower abdomen. Patient on oral ABX, continue IV fluid. Continue current POC.

## 2018-03-03 LAB
BACTERIA SPEC CULT: ABNORMAL
SPECIMEN SOURCE: ABNORMAL

## 2018-03-05 ENCOUNTER — CARE COORDINATION (OUTPATIENT)
Dept: FAMILY MEDICINE | Facility: CLINIC | Age: 63
End: 2018-03-05

## 2018-03-05 LAB
BACTERIA SPEC CULT: ABNORMAL
BACTERIA SPEC CULT: ABNORMAL
Lab: ABNORMAL
SPECIMEN SOURCE: ABNORMAL

## 2018-03-05 NOTE — PROGRESS NOTES
Care Coordination Assessment    PCP: [facility]    Referral Source:  ED/IP List      Clinical Data: Patient discharged from inpatient at [facility] 2/28/2018 with Acute Diverticulitis.  Patient discharged home with instructions to follow up with PCP in 7 days and surgeon in 7-10 days    Plan: I will forward to Stephanie in clinical support to assess and assist with appropriate follow up

## 2018-03-13 ENCOUNTER — HOSPITAL ENCOUNTER (OUTPATIENT)
Dept: CT IMAGING | Facility: CLINIC | Age: 63
Discharge: HOME | End: 2018-03-13
Attending: PHYSICIAN ASSISTANT | Admitting: PHYSICIAN ASSISTANT
Payer: COMMERCIAL

## 2018-03-13 DIAGNOSIS — K57.92 DIVERTICULITIS OF INTESTINE, UNSPECIFIED BLEEDING STATUS, UNSPECIFIED COMPLICATION STATUS, UNSPECIFIED PART OF INTESTINAL TRACT: ICD-10-CM

## 2018-03-13 PROCEDURE — 25000128 H RX IP 250 OP 636: Performed by: RADIOLOGY

## 2018-03-13 PROCEDURE — 72194 CT PELVIS W/O & W/DYE: CPT

## 2018-03-13 RX ORDER — IOPAMIDOL 755 MG/ML
500 INJECTION, SOLUTION INTRAVASCULAR ONCE
Status: COMPLETED | OUTPATIENT
Start: 2018-03-13 | End: 2018-03-13

## 2018-03-13 RX ADMIN — SODIUM CHLORIDE 30 ML: 9 INJECTION, SOLUTION INTRAVENOUS at 10:00

## 2018-03-13 RX ADMIN — IOPAMIDOL 5 ML: 755 INJECTION, SOLUTION INTRAVENOUS at 10:00

## 2018-03-13 NOTE — PROGRESS NOTES
Abscess drain removed per Dr. Frost without difficulty, patient tolerated well. Sterile dressing applied to site. All discharge criteria were met and patient discharged to home ambulatory with family in stable condition.

## 2018-05-14 ENCOUNTER — HOSPITAL ENCOUNTER (OUTPATIENT)
Facility: CLINIC | Age: 63
Discharge: HOME | End: 2018-05-14
Attending: COLON & RECTAL SURGERY | Admitting: COLON & RECTAL SURGERY
Payer: MEDICARE

## 2018-05-14 VITALS
HEIGHT: 67 IN | OXYGEN SATURATION: 95 % | RESPIRATION RATE: 15 BRPM | SYSTOLIC BLOOD PRESSURE: 123 MMHG | WEIGHT: 190 LBS | BODY MASS INDEX: 29.82 KG/M2 | DIASTOLIC BLOOD PRESSURE: 92 MMHG

## 2018-05-14 LAB — COLONOSCOPY: NORMAL

## 2018-05-14 PROCEDURE — 88305 TISSUE EXAM BY PATHOLOGIST: CPT | Performed by: COLON & RECTAL SURGERY

## 2018-05-14 PROCEDURE — 45385 COLONOSCOPY W/LESION REMOVAL: CPT | Performed by: COLON & RECTAL SURGERY

## 2018-05-14 PROCEDURE — 25000128 H RX IP 250 OP 636: Performed by: COLON & RECTAL SURGERY

## 2018-05-14 PROCEDURE — 88305 TISSUE EXAM BY PATHOLOGIST: CPT | Mod: 26 | Performed by: COLON & RECTAL SURGERY

## 2018-05-14 PROCEDURE — G0500 MOD SEDAT ENDO SERVICE >5YRS: HCPCS | Performed by: COLON & RECTAL SURGERY

## 2018-05-14 RX ORDER — FENTANYL CITRATE 50 UG/ML
INJECTION, SOLUTION INTRAMUSCULAR; INTRAVENOUS PRN
Status: DISCONTINUED | OUTPATIENT
Start: 2018-05-14 | End: 2018-05-14 | Stop reason: HOSPADM

## 2018-05-14 RX ORDER — NALOXONE HYDROCHLORIDE 0.4 MG/ML
.1-.4 INJECTION, SOLUTION INTRAMUSCULAR; INTRAVENOUS; SUBCUTANEOUS
Status: CANCELLED | OUTPATIENT
Start: 2018-05-14 | End: 2018-05-15

## 2018-05-14 RX ORDER — ONDANSETRON 4 MG/1
4 TABLET, ORALLY DISINTEGRATING ORAL EVERY 6 HOURS PRN
Status: CANCELLED | OUTPATIENT
Start: 2018-05-14

## 2018-05-14 RX ORDER — FLUMAZENIL 0.1 MG/ML
0.2 INJECTION, SOLUTION INTRAVENOUS
Status: CANCELLED | OUTPATIENT
Start: 2018-05-14 | End: 2018-05-14

## 2018-05-14 RX ORDER — LIDOCAINE 40 MG/G
CREAM TOPICAL
Status: CANCELLED | OUTPATIENT
Start: 2018-05-14

## 2018-05-14 RX ORDER — ONDANSETRON 2 MG/ML
4 INJECTION INTRAMUSCULAR; INTRAVENOUS
Status: CANCELLED | OUTPATIENT
Start: 2018-05-14

## 2018-05-14 RX ORDER — ONDANSETRON 2 MG/ML
4 INJECTION INTRAMUSCULAR; INTRAVENOUS EVERY 6 HOURS PRN
Status: CANCELLED | OUTPATIENT
Start: 2018-05-14

## 2018-05-14 NOTE — H&P
Pre-Endoscopy History and Physical     Lennox Acuna MRN# [id number]   YOB: 1955 Age: 63 year old     Date of Procedure: 5/14/2018  Primary care provider: Maria Luisa Peralta  Type of Endoscopy: colonoscopy  Reason for Procedure: follow up diverticulitis, surveillance  Type of Anesthesia Anticipated: Moderate Sedation    HPI:    Lennox is a 63 year old male who will be undergoing the above procedure.      A history and physical has been performed. The patient's medications and allergies have been reviewed. The risks and benefits of the procedure and the sedation options and risks were discussed with the patient.  All questions were answered and informed consent was obtained.      He denies a personal or family history of anesthesia complications or bleeding disorders.     Allergies   Allergen Reactions     Nkda [No Known Drug Allergies]         Prior to Admission Medications   Prescriptions Last Dose Informant Patient Reported? Taking?   ACETAMINOPHEN PO Unknown  Yes No   Sig: Take 1,000 mg by mouth every 6 hours as needed       Facility-Administered Medications: None       Patient Active Problem List   Diagnosis     Conductive hearing loss in left ear     Cholesteatoma     History of gastric ulcer     Health Care Home (V65.8)     ACP (advance care planning)     Internal derangement of knee     Wrist sprain, right, initial encounter     Acute diverticulitis        Past Medical History:   Diagnosis Date     HL (hearing loss)         Past Surgical History:   Procedure Laterality Date     COLONOSCOPY  2010    every 5 years/ DR Daugherty     HAND SURGERY  2/2012    thumb     HERNIA REPAIR  1990's    left     LASER DIODE STAPEDOTOMY Left 1/12/2015    Procedure: LASER DIODE STAPEDOTOMY;  Surgeon: Layton Garcia MD;  Location: [facility] OR     STRESS TEST  age 48    WNL     TONSILLECTOMY  age 8     TYMPANOMASTOIDECTOMY  2010/2011    facial nerve tumors     TYMPANOMASTOIDECTOMY WITH FACIAL MONITORING   "2011    Procedure:TYMPANOMASTOIDECTOMY WITH FACIAL MONITORING; Left Revision Tympanomastoidectomy with Partial  Ossicular Chain Reconstruction, using Facial Nerve Monitoring **latex safe room; Surgeon:TASHA CARRILLO; Location: OR       Social History   Substance Use Topics     Smoking status: Never Smoker     Smokeless tobacco: Never Used     Alcohol use No      Comment: 2-3 drinks per week       Family History   Problem Relation Age of Onset     Respiratory Father 81     copd       Hypertension Father      Breast Cancer Mother 60      67     Hypertension Mother      Hypertension Sister      Hypertension Brother      DIABETES No family hx of        REVIEW OF SYSTEMS:     5 point ROS negative except as noted above in HPI, including Gen., Resp., CV, GI &  system review.      PHYSICAL EXAM:   Ht 1.702 m (5' 7\")  Wt 86.2 kg (190 lb)  BMI 29.76 kg/m2 Estimated body mass index is 29.76 kg/(m^2) as calculated from the following:    Height as of this encounter: 1.702 m (5' 7\").    Weight as of this encounter: 86.2 kg (190 lb).   GENERAL APPEARANCE: healthy and alert  MENTAL STATUS: alert  AIRWAY EXAM: Mallampatti Class II (visualization of the soft palate, fauces, and uvula)  RESP: lungs clear to auscultation - no rales, rhonchi or wheezes  CV: regular rates and rhythm      DIAGNOSTICS:    Not indicated      IMPRESSION   ASA Class 2 - Mild systemic disease        PLAN:       Plan for colonoscopy. We discussed the risks, benefits and alternatives and the patient wished to proceed.    The above has been forwarded to the consulting provider.      Signed Electronically by: Babs Shabazz MD  May 14, 2018    "

## 2018-05-14 NOTE — DISCHARGE INSTRUCTIONS
Understanding Colon and Rectal Polyps     The colon has a smooth lining composed of millions of cells.     The colon (also called the large intestine) is a muscular tube that forms the last part of the digestive tract. It absorbs water and stores food waste. The colon is about 4 to 6 feet long. The rectum is the last 6 inches of the colon. The colon and rectum have a smooth lining composed of millions of cells. Changes in these cells can lead to growths in the colon that can become cancerous and should be removed.     When the Colon Lining Changes  Changes that occur in the cells that line the colon or rectum can lead to growths called polyps. Over a period of years, polyps can turn cancerous. Removing polyps early may prevent cancer from ever forming.      Polyps  Polyps are fleshy clumps of tissue that form on the lining of the colon or rectum. Small polyps are usually benign (not cancerous). However, over time, cells in a polyp can change and become cancerous. The larger a polyp grows, the more likely this is to happen. Also, certain types of polyps known as adenomatous polyps are considered premalignant. This means that they will almost always become cancerous if they re not removed.          Cancer  Almost all colorectal cancers start when polyp cells begin growing abnormally. As a cancerous tumor grows, it may involve more and more of the colon or rectum. In time, cancer can also grow beyond the colon or rectum and spread to nearby organs or to glands called lymph nodes. The cells can also travel to other parts of the body. This is known as metastasis. The earlier a cancerous tumor is removed, the better the chance of preventing its spread.        9811-6606 [facility], [address] [place] [address], [place] [address]. All rights reserved. This information is not intended as a substitute for professional medical care. Always follow your healthcare professional's instructions.      Understanding Diverticulosis  and Diverticulitis     Pouches or diverticula usually occur in the lower part of the colon called the sigmoid.      Diverticulitis occurs when the pouches become inflamed.     The colon (large intestine) is the last part of the digestive tract. It absorbs water from stool and changes it from a liquid to a solid. In certain cases, small pouches called diverticula can form in the colon wall. This condition is called diverticulosis. The pouches can become infected. If this happens, it becomes a more serious problem called diverticulitis. These problems can be painful. But they can be managed.   Managing Your Condition  Diet changes or taking medications are often tried first. These may be enough to bring relief. If the case is bad, surgery may be done. You and your doctor can discuss the plan that is best for you.  If You Have Diverticulosis  Diet changes are often enough to control symptoms. The main changes are adding fiber (roughage) and drinking more water. Fiber absorbs water as it travels through your colon. This helps your stool stay soft and move smoothly. Water helps this process. If needed, you may be told to take over-the-counter stool softeners. To help relieve pain, antispasmodic medications may be prescribed.  If You Have Diverticulitis  Treatment depends on how bad your symptoms are.  For mild symptoms: You may be put on a liquid diet for a short time. You may also be prescribed antibiotics. If these two steps relieve your symptoms, you may then be prescribed a high-fiber diet. If you still have symptoms, your doctor will discuss further treatment options with you.  For severe symptoms: You may need to be admitted to the hospital. There, you can be given IV antibiotics and fluids. Once symptoms are under control, the above treatments may be tried. If these don t control your condition, your doctor may discuss the option of having surgery with you.  Lexa to Colon Health  Help keep your colon healthy with  a diet that includes plenty of high-fiber fruits, vegetables, and whole grains. Drink plenty of liquids like water and juice. Your doctor may also recommend avoiding seeds and nuts.          1230-2632 [employer], [address] [place] [address], [place] [address]. All rights reserved. This information is not intended as a substitute for professional medical care. Always follow your healthcare professional's instructions.        HIGH FIBER DIET  Fiber is present in all fruits, vegetables, cereals and grains. Fiber passes through the body undigested. A high fiber diet helps food move through the intestinal tract. The added bulk is helpful in preventing constipation. In people with diverticulosis it serves to clean out the pouches along the colon wall while preventing new ones from forming. A high fiber diet also reduces the risk of colon cancer, decreases blood cholesterol and prevents high blood sugar in people with diabetes.    The foods listed below are high in fiber and should be included in your diet. If you are not used to high fiber foods, start with 1 or 2 foods from this list. Every 3-4 days add a new one to your diet until you are eating 4 high fiber foods per day. This should give you 20-35 Gm of fiber/day. It is also important to drink a lot of water when you are on this diet (6-8 glasses a day). Water causes the fiber to swell and increases the benefit.    FOODS HIGH IN DIETARY FIBER:  BREADS: Made with 100% whole wheat flour; imer, wheat or rye crackers; tortillas, bran muffins  CEREALS: Whole grain cereal with bran (Chex, Raisin Bran, Corn Bran), oatmeal, rolled oats, granola, wheat flakes, brown rice  NUTS: Any nuts  FRUITS: All fresh fruits along with edible skins, (bananas, citrus fruit, mangoes, pears, prunes, raisins, apples, pineapple, apricot, melon, jams and marmalades), fruit juices (especially prune juice)  VEGETABLES: All types, preferably raw or lightly cooked: especially, celery, eggplant,  potatoes, spinach, broccoli, brussel sprouts, winter squash, carrots, cauliflower, soybeans, lentils, fresh and dried beans of all kinds  OTHER: Popcorn, any spices      1313-3490 Jeanne [address] [address]. All rights reserved. This information is not intended as a substitute for professional medical care. Always follow your healthcare professional's instructions.

## 2018-05-14 NOTE — IP AVS SNAPSHOT
MRN:[id number]                      After Visit Summary   5/14/2018    Lennox Acuna    MRN: [id number]           Thank you!     Thank you for choosing [facility] for your care. Our goal is always to provide you with excellent care. Hearing back from our patients is one way we can continue to improve our services. Please take a few minutes to complete the written survey that you may receive in the mail after you visit. If you would like to speak to someone directly about your visit please contact Patient Relations at [phone]. Thank you!          Patient Information     Date Of Birth          1955        About your hospital stay     You were admitted on:  May 14, 2018 You last received care in the:  [facility]    You were discharged on:  May 14, 2018       Who to Call     For medical emergencies, please call 911.  For non-urgent questions about your medical care, please call your primary care provider or clinic, [phone]  For questions related to your surgery, please call your surgery clinic        Attending Provider     Provider Specialty    Babs Shabazz MD Colon and Rectal Surgery       Primary Care Provider Office Phone # Fax #    Maria Luisa Peralta PA-C [phone]      Further instructions from your care team         Understanding Colon and Rectal Polyps     The colon has a smooth lining composed of millions of cells.     The colon (also called the large intestine) is a muscular tube that forms the last part of the digestive tract. It absorbs water and stores food waste. The colon is about 4 to 6 feet long. The rectum is the last 6 inches of the colon. The colon and rectum have a smooth lining composed of millions of cells. Changes in these cells can lead to growths in the colon that can become cancerous and should be removed.     When the Colon Lining Changes  Changes that occur in the cells that line the colon or rectum  "can lead to growths called polyps. Over a period of years, polyps can turn cancerous. Removing polyps early may prevent cancer from ever forming.      Polyps  Polyps are fleshy clumps of tissue that form on the lining of the colon or rectum. Small polyps are usually benign (not cancerous). However, over time, cells in a polyp can change and become cancerous. The larger a polyp grows, the more likely this is to happen. Also, certain types of polyps known as adenomatous polyps are considered premalignant. This means that they will almost always become cancerous if they re not removed.          Cancer  Almost all colorectal cancers start when polyp cells begin growing abnormally. As a cancerous tumor grows, it may involve more and more of the colon or rectum. In time, cancer can also grow beyond the colon or rectum and spread to nearby organs or to glands called lymph nodes. The cells can also travel to other parts of the body. This is known as metastasis. The earlier a cancerous tumor is removed, the better the chance of preventing its spread.        2496-0318 [place] [address]. All rights reserved. This information is not intended as a substitute for professional medical care. Always follow your healthcare professional's instructions.    Pending Results     Date and Time Order Name Status Description    5/14/2018 0941 Surgical pathology exam In process             Admission Information     Date & Time Provider Department Dept. Phone    5/14/2018 Babs Shabazz MD [facility] [phone]      Your Vitals Were     Blood Pressure Respirations Height Weight Pulse Oximetry BMI (Body Mass Index)    136/85 19 1.702 m (5' 7\") 86.2 kg (190 lb) 94% 29.76 kg/m2      MyChart Information     [employer] gives you secure access to your electronic health record. If you see a primary care provider, you can also send messages to your care team and make appointments. If you " have questions, please call your primary care clinic.  If you do not have a primary care provider, please call [phone] and they will assist you.        Care EveryWhere ID     This is your Care EveryWhere ID. This could be used by other organizations to access your [facility] medical records  [id number]        Equal Access to Services     CESAR REYES : Glenna nevilleo Somarycarmen, waaxda luqadaha, qaybta kaalmada tristin, nitesh henry. So [facility] [phone].    ATENCIÓN: Si habla español, tiene a gee disposición servicios gratuitos de asistencia lingüística. Ester al [phone].    We comply with applicable federal civil rights laws and [place] laws. We do not discriminate on the basis of race, color, national origin, age, disability, sex, sexual orientation, or gender identity.               Review of your medicines      CONTINUE these medicines which have NOT CHANGED        Dose / Directions    ACETAMINOPHEN PO        Dose:  1000 mg   Take 1,000 mg by mouth every 6 hours as needed   Refills:  0                Protect others around you: Learn how to safely use, store and throw away your medicines at www.disposemymeds.org.             Medication List: This is a list of all your medications and when to take them. Check marks below indicate your daily home schedule. Keep this list as a reference.      Medications           Morning Afternoon Evening Bedtime As Needed    ACETAMINOPHEN PO   Take 1,000 mg by mouth every 6 hours as needed

## 2018-05-14 NOTE — OP NOTE
See Provation Note In Chart    Babs Shabazz MD  [facility]  Office Phone # [phone]

## 2018-05-15 LAB — COPATH REPORT: NORMAL

## 2019-01-21 ENCOUNTER — OFFICE VISIT (OUTPATIENT)
Dept: FAMILY MEDICINE | Facility: CLINIC | Age: 64
End: 2019-01-21

## 2019-01-21 VITALS
HEART RATE: 61 BPM | TEMPERATURE: 98 F | WEIGHT: 189 LBS | SYSTOLIC BLOOD PRESSURE: 138 MMHG | OXYGEN SATURATION: 98 % | DIASTOLIC BLOOD PRESSURE: 94 MMHG | BODY MASS INDEX: 29.66 KG/M2 | HEIGHT: 67 IN

## 2019-01-21 DIAGNOSIS — M19.041 PRIMARY OSTEOARTHRITIS OF RIGHT HAND: Primary | ICD-10-CM

## 2019-01-21 PROCEDURE — 99213 OFFICE O/P EST LOW 20 MIN: CPT | Performed by: PHYSICIAN ASSISTANT

## 2019-01-21 ASSESSMENT — MIFFLIN-ST. JEOR: SCORE: 1596.96

## 2019-01-22 NOTE — NURSING NOTE
Lennox is here because he needs a referral for [facility] for right hand            Pre-visit Screening:  Immunizations:  up to date  Colonoscopy:  is up to date  Mammogram: NA  Asthma Action Test/Plan:  NA  PHQ9:  none  GAD7:  none  Questioned patient about current smoking habits Pt. has never smoked.  Ok to leave detailed message on voice mail for today's visit only Yes, phone # [phone]

## 2019-01-22 NOTE — PROGRESS NOTES
"CC: Hand issue    History:  Lennox is here today with concerns about worsening pain in right hand. Pain is worst in his right thumb and index finger. Hurts particularly in index finger at MCP joint, with any pinching/squeezing muchanism, or bumping something against it. Lennox has had injections in this area through [facility] as recently as 1/2018, which was his 2nd injection, and helped significantly both times. Would like to be referred back to repeat this injection.    He has had x-ray taken both through out clinic and then [facility] showing degenerative changes in that area.     PMH, MEDICATIONS, ALLERGIES, SOCIAL AND FAMILY HISTORY in EPIC and reviewed by me personally.      ROS negative other than the symptoms noted above in the HPI.        Examination   BP (!) 138/94 (BP Location: Left arm, Patient Position: Sitting, Cuff Size: Adult Large)   Pulse 61   Temp 98  F (36.7  C) (Oral)   Ht 1.695 m (5' 6.75\")   Wt 85.7 kg (189 lb)   SpO2 98%   BMI 29.82 kg/m         Constitutional: Sitting comfortably, in no acute distress. Vital signs noted  Neck:  no adenopathy, trachea midline and normal to palpation  Cardiovascular:  regular rate and rhythm, no murmurs, clicks, or gallops  Respiratory:  normal respiratory rate and rhythm, lungs clear to auscultation  M/S: ROM of right hand index finger limited to 80% of normal flexion. Mild edema at MCP joint of index finger, and tender to palpation.   NEURO:  muscle strength normal, sensation to light touch and pinprick normal  SKIN: No jaundice/pallor/rash.   Psychiatric: mentation appears normal and affect normal/bright        A/P    ICD-10-CM    1. Primary osteoarthritis of right hand M19.041 ORTHOPEDICS ADULT REFERRAL    Worst in 2nd digit MCP.        DISCUSSION:  Given success in the past with corticosteroid injections, with known degenerative changes in right hand, agreed it would be reasonable for him to be seen once again by orthopedic specialist. Will complete referral for " this today, and he can call to schedule. Will fax referral as requested. He should contact us if having trouble scheduling.     follow up visit: As needed    Maria Luisa Peralta PA-C  [facility]

## 2019-01-28 ENCOUNTER — TRANSFERRED RECORDS (OUTPATIENT)
Dept: FAMILY MEDICINE | Facility: CLINIC | Age: 64
End: 2019-01-28

## 2019-02-06 ENCOUNTER — TRANSFERRED RECORDS (OUTPATIENT)
Dept: FAMILY MEDICINE | Facility: CLINIC | Age: 64
End: 2019-02-06

## 2019-05-02 ENCOUNTER — APPOINTMENT (OUTPATIENT)
Dept: GENERAL RADIOLOGY | Facility: CLINIC | Age: 64
End: 2019-05-02
Attending: EMERGENCY MEDICINE
Payer: COMMERCIAL

## 2019-05-02 ENCOUNTER — HOSPITAL ENCOUNTER (EMERGENCY)
Facility: CLINIC | Age: 64
Discharge: HOME | End: 2019-05-02
Attending: EMERGENCY MEDICINE | Admitting: EMERGENCY MEDICINE
Payer: COMMERCIAL

## 2019-05-02 VITALS
RESPIRATION RATE: 23 BRPM | HEART RATE: 71 BPM | DIASTOLIC BLOOD PRESSURE: 93 MMHG | TEMPERATURE: 98.3 F | SYSTOLIC BLOOD PRESSURE: 156 MMHG | OXYGEN SATURATION: 94 %

## 2019-05-02 DIAGNOSIS — R07.9 CHEST PAIN, UNSPECIFIED TYPE: ICD-10-CM

## 2019-05-02 DIAGNOSIS — S29.011A PECTORALIS MUSCLE STRAIN, INITIAL ENCOUNTER: ICD-10-CM

## 2019-05-02 LAB
ANION GAP SERPL CALCULATED.3IONS-SCNC: 7 MMOL/L (ref 3–14)
BASOPHILS # BLD AUTO: 0.1 10E9/L (ref 0–0.2)
BASOPHILS NFR BLD AUTO: 0.8 %
BUN SERPL-MCNC: 18 MG/DL (ref 7–30)
CALCIUM SERPL-MCNC: 8.5 MG/DL (ref 8.5–10.1)
CHLORIDE SERPL-SCNC: 110 MMOL/L (ref 94–109)
CO2 SERPL-SCNC: 23 MMOL/L (ref 20–32)
CREAT SERPL-MCNC: 1.01 MG/DL (ref 0.66–1.25)
DIFFERENTIAL METHOD BLD: NORMAL
EOSINOPHIL # BLD AUTO: 0.3 10E9/L (ref 0–0.7)
EOSINOPHIL NFR BLD AUTO: 3.8 %
ERYTHROCYTE [DISTWIDTH] IN BLOOD BY AUTOMATED COUNT: 13.2 % (ref 10–15)
GFR SERPL CREATININE-BSD FRML MDRD: 77 ML/MIN/{1.73_M2}
GLUCOSE SERPL-MCNC: 87 MG/DL (ref 70–99)
HCT VFR BLD AUTO: 46 % (ref 40–53)
HGB BLD-MCNC: 15.2 G/DL (ref 13.3–17.7)
IMM GRANULOCYTES # BLD: 0 10E9/L (ref 0–0.4)
IMM GRANULOCYTES NFR BLD: 0.3 %
INTERPRETATION ECG - MUSE: NORMAL
LYMPHOCYTES # BLD AUTO: 1.9 10E9/L (ref 0.8–5.3)
LYMPHOCYTES NFR BLD AUTO: 23.6 %
MCH RBC QN AUTO: 30.3 PG (ref 26.5–33)
MCHC RBC AUTO-ENTMCNC: 33 G/DL (ref 31.5–36.5)
MCV RBC AUTO: 92 FL (ref 78–100)
MONOCYTES # BLD AUTO: 0.7 10E9/L (ref 0–1.3)
MONOCYTES NFR BLD AUTO: 8.2 %
NEUTROPHILS # BLD AUTO: 5 10E9/L (ref 1.6–8.3)
NEUTROPHILS NFR BLD AUTO: 63.3 %
NRBC # BLD AUTO: 0 10*3/UL
NRBC BLD AUTO-RTO: 0 /100
PLATELET # BLD AUTO: 281 10E9/L (ref 150–450)
POTASSIUM SERPL-SCNC: 4.1 MMOL/L (ref 3.4–5.3)
RBC # BLD AUTO: 5.01 10E12/L (ref 4.4–5.9)
SODIUM SERPL-SCNC: 140 MMOL/L (ref 133–144)
TROPONIN I SERPL-MCNC: <0.015 UG/L (ref 0–0.04)
TROPONIN I SERPL-MCNC: <0.015 UG/L (ref 0–0.04)
WBC # BLD AUTO: 7.9 10E9/L (ref 4–11)

## 2019-05-02 PROCEDURE — 71046 X-RAY EXAM CHEST 2 VIEWS: CPT

## 2019-05-02 PROCEDURE — 36415 COLL VENOUS BLD VENIPUNCTURE: CPT

## 2019-05-02 PROCEDURE — 36415 COLL VENOUS BLD VENIPUNCTURE: CPT | Performed by: EMERGENCY MEDICINE

## 2019-05-02 PROCEDURE — 25000132 ZZH RX MED GY IP 250 OP 250 PS 637: Performed by: EMERGENCY MEDICINE

## 2019-05-02 PROCEDURE — 84484 ASSAY OF TROPONIN QUANT: CPT | Mod: 91 | Performed by: EMERGENCY MEDICINE

## 2019-05-02 PROCEDURE — 85025 COMPLETE CBC W/AUTO DIFF WBC: CPT | Performed by: EMERGENCY MEDICINE

## 2019-05-02 PROCEDURE — 93005 ELECTROCARDIOGRAM TRACING: CPT

## 2019-05-02 PROCEDURE — 99285 EMERGENCY DEPT VISIT HI MDM: CPT | Mod: 25

## 2019-05-02 PROCEDURE — 80048 BASIC METABOLIC PNL TOTAL CA: CPT | Performed by: EMERGENCY MEDICINE

## 2019-05-02 RX ORDER — NITROGLYCERIN 0.4 MG/1
0.4 TABLET SUBLINGUAL EVERY 5 MIN PRN
Status: DISCONTINUED | OUTPATIENT
Start: 2019-05-02 | End: 2019-05-02 | Stop reason: HOSPADM

## 2019-05-02 RX ORDER — ASPIRIN 81 MG/1
324 TABLET, CHEWABLE ORAL ONCE
Status: COMPLETED | OUTPATIENT
Start: 2019-05-02 | End: 2019-05-02

## 2019-05-02 RX ADMIN — ASPIRIN 81 MG 324 MG: 81 TABLET ORAL at 14:36

## 2019-05-02 ASSESSMENT — ENCOUNTER SYMPTOMS
SHORTNESS OF BREATH: 0
LIGHT-HEADEDNESS: 0

## 2019-05-02 NOTE — ED AVS SNAPSHOT
[facility] Emergency Department  [address]  [facility] [address]  Phone:  [phone]  Fax:  [phone]                                    Lennox Acuna   MRN: [id number]    Department:  [facility] Emergency Department   Date of Visit:  5/2/2019           After Visit Summary Signature Page    I have received my discharge instructions, and my questions have been answered. I have discussed any challenges I see with this plan with the nurse or doctor.    ..........................................................................................................................................  Patient/Patient Representative Signature      ..........................................................................................................................................  Patient Representative Print Name and Relationship to Patient    ..................................................               ................................................  Date                                   Time    ..........................................................................................................................................  Reviewed by Signature/Title    ...................................................              ..............................................  Date                                               Time          22EPIC Rev 08/18

## 2019-05-02 NOTE — ED PROVIDER NOTES
History     Chief Complaint:  Chest Pain    The history is provided by the patient and the spouse.      Lennox Acuna is a 64 year old male who presents to the emergency department today with chest pain. Patient states he was having some chest pain earlier today (about an hour ago) while grocery shopping, it was after he picked up a bag and bagged his items. He describes the pain as sharp and radiating to his right arm and on the right side of the chest. The pain did not move. The pain did not go away for 5-10 minutes and patient became concerned and presents to the ER. Currently pain is resolved.  He denies shortness of breath, light-headedness, syncope associated with this pain. Patient does state he is doing physical therapy on his shoulder with heavier weights than usual and using his arms yesterday.    Allergies:  No Known Drug Allergies     Medications:    Acetaminophen    Past Medical History:    Diverticulitis   Hearing loss left ear  Wrist sprain  Internal derangement of knee  Gastric ulcer  Cholesteatoma     Past Surgical History:    Colonoscopy  Hand surgery  Hernia repair  Diode stapedotomy laser  Stress test  Tonsillectomy   Tympanomastoidectomy with facial monitoring     Family History:    Stroke   Respiratory  Breast cancer  Hypertension    Social History:  The patient was accompanied to the ED by wife.  Smoking Status: Never  Smokeless Tobacco: Never  Alcohol Use: Yes, 2-3 drinks per week    Marital Status:       Review of Systems   Respiratory: Negative for shortness of breath.    Cardiovascular: Positive for chest pain (right side).   Neurological: Negative for syncope and light-headedness.   All other systems reviewed and are negative.    Physical Exam     Patient Vitals for the past 24 hrs:   BP Temp Temp src Pulse Heart Rate Resp SpO2   05/02/19 1645 (!) 156/93 -- -- 71 66 23 94 %   05/02/19 1630 (!) 153/93 -- -- 73 66 24 94 %   05/02/19 1600 (!) 139/95 -- -- 69 68 -- 95 %    05/02/19 1530 155/89 -- -- 64 62 21 97 %   05/02/19 1500 143/89 -- -- -- -- -- --   05/02/19 1400 (!) 138/104 -- -- 77 71 15 97 %   05/02/19 1348 (!) 141/106 98.3  F (36.8  C) Temporal 74 -- 20 99 %      Physical Exam  Constitutional: Vital signs reviewed as above.   Head: No external signs of trauma noted.  Eyes: Pupils are equal, round, and reactive to light.   Neck: No JVD noted  Cardiovascular: Normal rate, regular rhythm and normal heart sounds.  No murmur heard. Equal B/L peripheral pulses.  Pulmonary/Chest: Effort normal and breath sounds normal. No respiratory distress. Patient has no wheezes. Patient has no rales.   Gastrointestinal: Soft. There is no tenderness.   Musculoskeletal/Extremities: No edema noted. Normal tone. Normal RUE ROM. Mild reproducible tenderness at the right pectoralis muscle.  Neurological: Patient is alert and oriented to person, place, and time.   Skin: Skin is warm and dry. There is no diaphoresis noted.   Psychiatric: The patient appears calm.    Emergency Department Course     ECG:  Indication: chest pain  Completed at 1357.  Read at 1401.   Normal sinus rhythm   Possible left atrial enlargement   Borderline ECG    No significant change compared to 10/3/2017 and 1/12/15  Rate 61 bpm. TX interval 144. QRS duration 92. QT/QTc 406/408. P-R-T axes 68 51 35.     Imaging:  Radiology findings were communicated with the patient and family who voiced understanding of the findings.  Chest XR,  PA & LAT   Final Result   IMPRESSION: Cardiomediastinal silhouette is normal. Lungs are clear.   Pulmonary vascularity is normal. No pleural fluid. No acute disease.   Incidental note made of evidence of right rotator cuff repair.      JANA CAMACHO MD         Laboratory:  Laboratory findings were communicated with the patient and family who voiced understanding of the findings.  Troponin (Collected 1428): <0.015  Troponin (Collected 1557): <0.015   CBC: AWNL (WBC 7.9, HGB 15.2, )  BMP: 110 Cl  o/w WNL (Creatinine 1.01)     Interventions:  1436: Aspirin 324 mg PO      Emergency Department Course:  Nursing notes and vitals reviewed.  1403: I performed an exam of the patient as documented above.   IV was inserted and blood was drawn for laboratory testing, results above.  The patient was sent for a Chest XR while in the emergency department, results above.   1645: Patient rechecked and updated.   1645: Findings and plan explained to the Patient and spouse. Patient discharged home with instructions regarding supportive care, medications, and reasons to return. The importance of close follow-up was reviewed.  I personally reviewed the laboratory and imaging results with the Patient and spouse and answered all related questions prior to discharge.     Impression & Plan    Medical Decision Making:  This 64-year-old male patient presents the ED due to chest pain.  Please see the HPI and exam for specifics.  The patient notes that his symptoms improved before arriving in the ED.  Based on his description of symptoms I believe he likely irritated his right rotator cuff as he recently had surgery on that and has been undergoing physical therapy and notes that he did a little more strenuous physical therapy yesterday.  The patient's initial troponin and 3-hour troponin but (after symptom onset) are normal.  At this time I have a low suspicion for ACS and I believe he can be discharged.  I will encourage him to follow-up in the outpatient setting and certainly return should his symptoms worsen.  Anticipatory guidance given prior to discharge.     Diagnosis:    ICD-10-CM    1. Chest pain, unspecified type R07.9    2. Pectoralis muscle strain, initial encounter S29.011A        Disposition:  discharged to home    Scribe Disclosure:  Denia PEREZ, am serving as a scribe at 2:10 PM on 5/2/2019 to document services personally performed by Tomy Velasquez DO based on my observations and the provider's statements  to me.   5/2/2019   [facility] EMERGENCY DEPARTMENT       Tomy Velasquez DO  05/02/19 2008

## 2019-05-02 NOTE — ED NOTES
Pt resting Comfortably on bed.  Alert & Oriented  Pt has no complaints at this time.  See follow assessment.

## 2019-05-02 NOTE — ED TRIAGE NOTES
Chest pain started at 1300. Patient states the pain is now gone. ABC intact alert and no distress.

## 2019-05-17 ENCOUNTER — TRANSFERRED RECORDS (OUTPATIENT)
Dept: FAMILY MEDICINE | Facility: CLINIC | Age: 64
End: 2019-05-17

## 2019-07-24 ENCOUNTER — TRANSFERRED RECORDS (OUTPATIENT)
Dept: FAMILY MEDICINE | Facility: CLINIC | Age: 64
End: 2019-07-24

## 2019-12-16 ENCOUNTER — HEALTH MAINTENANCE LETTER (OUTPATIENT)
Age: 64
End: 2019-12-16

## 2019-12-27 ENCOUNTER — OFFICE VISIT (OUTPATIENT)
Dept: FAMILY MEDICINE | Facility: CLINIC | Age: 64
End: 2019-12-27

## 2019-12-27 VITALS
WEIGHT: 188 LBS | BODY MASS INDEX: 29.67 KG/M2 | DIASTOLIC BLOOD PRESSURE: 82 MMHG | OXYGEN SATURATION: 98 % | TEMPERATURE: 98 F | SYSTOLIC BLOOD PRESSURE: 138 MMHG | HEART RATE: 96 BPM

## 2019-12-27 DIAGNOSIS — J06.9 VIRAL URI WITH COUGH: Primary | ICD-10-CM

## 2019-12-27 PROCEDURE — 99213 OFFICE O/P EST LOW 20 MIN: CPT | Performed by: PHYSICIAN ASSISTANT

## 2019-12-27 RX ORDER — BENZONATATE 100 MG/1
100 CAPSULE ORAL 3 TIMES DAILY PRN
Qty: 30 CAPSULE | Refills: 0 | Status: SHIPPED | OUTPATIENT
Start: 2019-12-27 | End: 2020-10-30

## 2019-12-27 NOTE — NURSING NOTE
Lennox is here for cough for 3 weeks and throat pain.        Pre-visit Screening:  Immunizations:  up to date  Colonoscopy:  is up to date  Mammogram: NA  Asthma Action Test/Plan:  NA  PHQ9:  None  GAD7:  None  Questioned patient about current smoking habits Pt. has never smoked.  Ok to leave detailed message on voice mail for today's visit only Ye, phone # cell

## 2019-12-27 NOTE — PROGRESS NOTES
CC: Cough, sore throat    History:  Symptoms started about 3 weeks ago. Was having coughing and sore throat. Seemed to better, was able to travel to [facility]. Then 1 week ago, symptoms came back severely. Was having sweats, chills, coughing up green phlegm, shortness of breath, headache, body aches. No known fever. This morning waking up did feel better. Has been using Nyquil, cough    Lennox does not get his flu shot.    PMH, MEDICATIONS, ALLERGIES, SOCIAL AND FAMILY HISTORY in [facility] and reviewed by me personally.    ROS negative other than the symptoms noted above in the HPI.    Examination   /82 (BP Location: Right arm, Patient Position: Sitting, Cuff Size: Adult Large)   Pulse 96   Temp 98  F (36.7  C) (Oral)   Wt 85.3 kg (188 lb)   SpO2 98%   BMI 29.67 kg/m       Constitutional: Sitting comfortably, in no acute distress. Vital signs noted  Eyes: pupils equal round reactive to light and accomodation, extra ocular movements intact  Ears: external canals and TMs free of abnormalities  Nose: patent, without mucosal abnormalities  Mouth and throat: without erythema or lesions of the mucosa  Neck:  no adenopathy, trachea midline and normal to palpation  Cardiovascular:  regular rate and rhythm, no murmurs, clicks, or gallops  Respiratory:  normal respiratory rate and rhythm, lungs clear to auscultation  SKIN: No jaundice/pallor/rash.   Psychiatric: mentation appears normal and affect normal/bright    A/P    ICD-10-CM    1. Viral URI with cough J06.9 benzonatate (TESSALON) 100 MG capsule    B97.89        DISCUSSION:  Most consistent with an upper respiratory infection- likely still viral given relatively short duration.     Recommended symptomatic therapies including cough suppressant, expectorant, and can consider decongestant for nasal congestion. Also encouraged plenty of sleep, fluids, and good nutrition as well.  Warned pt that cough itself can persist for several weeks, but should gradually feel better  throughout that time.     Tessalon Perles as needed for cough suppresion. Cautioned that this medication can cause drowsiness, so patient should first use this medication at a time where they will not need to do any driving or anything else that is dangerous while drowsy until they knows how their body will react.     Contact me next week if not noticing improvement, or sooner if worsening. Would likely prescribe abx for bronchitis at that time.    follow up visit: As needed    Maria Luisa Peralta PA-C  [facility]

## 2019-12-27 NOTE — LETTER
[facility]  [address], [address]  [place]  [address]    December 27, 2019        Lennox Acuna  [id number] [id number]              To Whom It May Concern:    Due to illness, please excuse Lennox from all work responsibilities 12/23/2019 through 12/27/2019. He can hopefully return to work without restriction 12/30/2019.     If you have any further questions or problems, please contact our office at [phone].          Maria Luisa Peralta PA-C

## 2020-07-30 ENCOUNTER — TRANSFERRED RECORDS (OUTPATIENT)
Dept: FAMILY MEDICINE | Facility: CLINIC | Age: 65
End: 2020-07-30

## 2020-10-06 ENCOUNTER — TELEPHONE (OUTPATIENT)
Dept: OTOLARYNGOLOGY | Facility: CLINIC | Age: 65
End: 2020-10-06

## 2020-10-06 DIAGNOSIS — H90.A12 CONDUCTIVE HEARING LOSS OF LEFT EAR WITH RESTRICTED HEARING OF RIGHT EAR: Primary | ICD-10-CM

## 2020-10-06 NOTE — TELEPHONE ENCOUNTER
[facility] Call Center    Phone Message    May a detailed message be left on voicemail: yes     Reason for Call: Other: Pt was seen had 2 surgeries with Dr Garcia several Years ago last seen in 2015 also seen Dr Hodges and didn't care for her , he has several questions about the tubes in his ear and would like a call back to discuss  Thank you,    Action Taken: Message routed to:  [facility] ([facility] ENT    Travel Screening: Not Applicable

## 2020-10-16 NOTE — TELEPHONE ENCOUNTER
FUTURE VISIT INFORMATION      FUTURE VISIT INFORMATION:    Date: 10/30/2020    Time: 10AM    [place] [facility]  REFERRAL INFORMATION:    Referring provider:      Referring providers clinic:      Reason for visit/diagnosis  Previous Radha patient - Talk to Jinny before rescheduling     RECORDS REQUESTED FROM:       Clinic name Comments Records Status Imaging Status   [facility]  9/11/2015 note from Dr Garcia  9/11/2015 audiogram from Parvin Guzman    1/12/2015 Left Diode Laser Revision Stapedotomy Replacement of PORP    12/19/2011 Left Revision Tympanomastoidectomy with Partial  Ossicular Chain Reconstruction, using Facial Nerve Monitoring     EPIC    Imaging 10/8/2014 CT Temporal Bone Epic PACS

## 2020-10-16 NOTE — TELEPHONE ENCOUNTER
Spoke to patient regarding his concerns. He is concerned the stapes prothesis has shifted as he can no longer hear. Patient requested not to see Dr. Hodges again. Scheduled patient with Dr. Moore with a new hearing test before.

## 2020-10-30 ENCOUNTER — TELEPHONE (OUTPATIENT)
Dept: OTOLARYNGOLOGY | Facility: CLINIC | Age: 65
End: 2020-10-30

## 2020-10-30 ENCOUNTER — OFFICE VISIT (OUTPATIENT)
Dept: OTOLARYNGOLOGY | Facility: CLINIC | Age: 65
End: 2020-10-30
Payer: COMMERCIAL

## 2020-10-30 ENCOUNTER — PRE VISIT (OUTPATIENT)
Dept: OTOLARYNGOLOGY | Facility: CLINIC | Age: 65
End: 2020-10-30

## 2020-10-30 VITALS
SYSTOLIC BLOOD PRESSURE: 173 MMHG | RESPIRATION RATE: 17 BRPM | BODY MASS INDEX: 31.71 KG/M2 | WEIGHT: 202 LBS | TEMPERATURE: 98 F | OXYGEN SATURATION: 98 % | HEART RATE: 73 BPM | DIASTOLIC BLOOD PRESSURE: 96 MMHG | HEIGHT: 67 IN

## 2020-10-30 DIAGNOSIS — H90.A12 CONDUCTIVE HEARING LOSS OF LEFT EAR WITH RESTRICTED HEARING OF RIGHT EAR: Primary | ICD-10-CM

## 2020-10-30 DIAGNOSIS — H72.91 PERFORATION OF TYMPANIC MEMBRANE, RIGHT: ICD-10-CM

## 2020-10-30 DIAGNOSIS — H71.92 CHOLESTEATOMA OF LEFT EAR: ICD-10-CM

## 2020-10-30 DIAGNOSIS — H61.22 IMPACTED CERUMEN OF LEFT EAR: ICD-10-CM

## 2020-10-30 PROCEDURE — 69210 REMOVE IMPACTED EAR WAX UNI: CPT | Mod: LT | Performed by: OTOLARYNGOLOGY

## 2020-10-30 PROCEDURE — 99203 OFFICE O/P NEW LOW 30 MIN: CPT | Mod: 25 | Performed by: OTOLARYNGOLOGY

## 2020-10-30 RX ORDER — PREDNISOLONE ACETATE 10 MG/ML
3 SUSPENSION/ DROPS OPHTHALMIC 2 TIMES DAILY
Qty: 10 ML | Refills: 0 | Status: SHIPPED | OUTPATIENT
Start: 2020-10-30 | End: 2020-11-13

## 2020-10-30 RX ORDER — OMEGA-3 FATTY ACIDS/FISH OIL 300-1000MG
200 CAPSULE ORAL EVERY 4 HOURS PRN
COMMUNITY
End: 2021-11-30

## 2020-10-30 RX ORDER — CIPROFLOXACIN HYDROCHLORIDE 3.5 MG/ML
3 SOLUTION/ DROPS TOPICAL 2 TIMES DAILY
Qty: 10 ML | Refills: 0 | Status: SHIPPED | OUTPATIENT
Start: 2020-10-30 | End: 2020-11-13

## 2020-10-30 RX ORDER — CIPROFLOXACIN AND DEXAMETHASONE 3; 1 MG/ML; MG/ML
SUSPENSION/ DROPS AURICULAR (OTIC)
Qty: 15 ML | Refills: 0 | Status: SHIPPED | OUTPATIENT
Start: 2020-10-30 | End: 2020-11-09

## 2020-10-30 ASSESSMENT — MIFFLIN-ST. JEOR: SCORE: 1649.9

## 2020-10-30 ASSESSMENT — PAIN SCALES - GENERAL: PAINLEVEL: NO PAIN (0)

## 2020-10-30 NOTE — NURSING NOTE
"Chief Complaint   Patient presents with     Consult     fo,masoud garcia patient      Blood pressure (!) 173/96, pulse 73, temperature 98  F (36.7  C), resp. rate 17, height 5' 7\" (170.2 cm), weight 202 lb (91.6 kg), SpO2 98 %.    Robb Wiggins LPN    "

## 2020-10-30 NOTE — TELEPHONE ENCOUNTER
Spoke with Nicole and informed her that alternatives Ciloxin and pred-forte were sent in for the patient instead of Ciprodex. Nicole will call and see if they are covered by insurance.

## 2020-10-30 NOTE — LETTER
10/30/2020       RE: Lennox Acuna  [address] [address] [facility] [address]     Dear Colleague,    Thank you for referring your patient, Lennox Acuna, to the [facility] E[facility] NOSE AND THROAT [facility]IN[facility] [place] [facility]. Please see a copy of my visit note below.    October 30,2020    I had the pleasure of seeing Lennox Acuna today in the Neurotology Clinic.    He is prior patient of Dr. Garcia and Dr. Hodges, last seeing them in 2015. I reviewed his surgical history.  His last surgery with Dr. Garcia in 2015 included revision PORP and removal of a small amount of residual cholesteatoma.  A tube has been in place in that ear since.  The right ear developed an effusion and Dr. Hodgse placed a PE tube, removed it when the patient felt that it negatively impacted his hearing, and then placed a paper patch.    He comes today stating that the hearing in his left ear has worsened over the past year.  For a while he found that he was able to manipulate his auricle and his hearing would improve, but for the last several weeks he has been unable to do this and the hearing has been particularly poor in the left ear.  His right ear hearing seems stable, however he has been unable to pop the ear as he had been able to do in the past.    Past Medical History:   Diagnosis Date     HL (hearing loss)      Past Surgical History:   Procedure Laterality Date     COLONOSCOPY  2010    every 5 years/ DR Daugherty     HAND SURGERY  2/2012    thumb     HERNIA REPAIR  1990's    left     LASER DIODE STAPEDOTOMY Left 1/12/2015    Procedure: LASER DIODE STAPEDOTOMY;  Surgeon: Layton Garcia MD;  Location: [facility] OR     STRESS TEST  age 48    WNL     TONSILLECTOMY  age 8     TYMPANOMASTOIDECTOMY  2010/2011    facial nerve tumors     TYMPANOMASTOIDECTOMY WITH FACIAL MONITORING  12/19/2011    Procedure:TYMPANOMASTOIDECTOMY WITH FACIAL MONITORING; Left Revision Tympanomastoidectomy with Partial   "Ossicular Chain Reconstruction, using Facial Nerve Monitoring **latex safe room; Surgeon:TASHA CARRILLO; Location:[facility] OR        Allergies   Allergen Reactions     Nkda [No Known Drug Allergies]      Current Outpatient Medications   Medication     ACETAMINOPHEN PO     ibuprofen (ADVIL/MOTRIN) 200 MG capsule     No current facility-administered medications for this visit.      Social History     Tobacco Use     Smoking status: Never Smoker     Smokeless tobacco: Never Used   Substance Use Topics     Alcohol use: No     Comment: 2-3 drinks per week     Drug use: No     Family History   Problem Relation Age of Onset     Respiratory Father 81        copd       Hypertension Father      Breast Cancer Mother 60         67     Hypertension Mother      Hypertension Sister      Hypertension Brother      Diabetes No family hx of      Patient Supplied Answers to Review of Systems  [facility] ROS 2020   Gastrointestinal/Genitourinary Heartburn/indigestion   10 point ROS otherwise negative    On physical exam:  BP (!) 173/96   Pulse 73   Temp 98  F (36.7  C)   Resp 17   Ht 1.702 m (5' 7\")   Wt 91.6 kg (202 lb)   SpO2 98%   BMI 31.64 kg/m     Gen: no acute distress, unaccompanied  Resp: no stridor, stertor, cough  Psych: normal affect  Neuro: normal facial nerve function  Ears: normal pinna, postauricular scar    Ear microscope exam:  Tight tympanic membrane is atelectatic in his posterior aspect and is retracted down onto what appears to be a necrotic incus and stapes superstructure.  The chorda tympani nerve is draped with the tympanic membrane.  There is a small perforation of the tympanic membrane just posterior to the stapes capitulum.  There is no middle ear effusion.    On the left side, there is a complete impaction of cerumen out to the meatus with an indentation that is the shape of a cotton swab.  I elevated this underneath the microscope using a right angle, alligator, and multiple suctions.  Medial to " the cerumen impaction there is a infection consisting of atilio purulent debris and moist squamous debris stuck to the tympanic membrane I suctioned all of this off and uncovered a pressure equalization tube in the anterior superior aspect that has a granuloma filling it.  Suction at 2 and it would not allow itself to be freed and opened.  The surrounding skin is quite friable and also blood during the cleaning.    Impression and Plan:  1. Left cerumen impaction  2. Postobstructive acute otitis externa  3. Occluded PE tube  4. Prior left cholesteatoma, PORP in place  5. Right chronic otitis media with retraction and perforation.    We are going to start Ciprodex on the left ear.  Noticing that his hearing improved with manipulation of the auricle suggest that he was moving the cerumen impaction around it has been accumulating over time.  His tube is occluded.  We will start with Ciprodex for 2 weeks and dry ear precautions.  I suspect the tube will remain occluded and we will need to remove it, let things heal, and then replace it at a future visit.  After the completion of the infection treatment we will have him get an audiogram and see me back in the clinic for a recheck.  We can determine nect  steps from there.  The hole in the right ear is acting like a microtube and he will not be able to pop the ear accordingly and at least it is potentially preventing further progression.     Joy Moore MD  Otology & Neurotology  [facility]

## 2020-10-30 NOTE — PATIENT INSTRUCTIONS
1. You were seen in the ENT Clinic today by .  If you have any questions or concerns after your appointment, please call   - Option 1: ENT Clinic: [phone]  - Option 2: Jinny (' Nurse): [phone]    2.   Plan to return to clinic in 3 weeks.     SOLE Stearns  Care Coordinator  [facility]  [phone]

## 2020-10-30 NOTE — TELEPHONE ENCOUNTER
[facility] Call Center    Phone Message    May a detailed message be left on voicemail: yes     Reason for Call: Other: ciprofloxacin-dexamethasone (CIPRODEX) 0.3-0.1 % otic suspension not covered by Insurance, needs alternative ASAP to deal with infection. Please call Pt's Wife Nicole back with options or alternatives ASAP. Thanks!     Action Taken: Message routed to:  [facility] ENT    Travel Screening: Not Applicable

## 2020-10-30 NOTE — PROGRESS NOTES
October 30,2020    I had the pleasure of seeing Lennox Acuna today in the Neurotology Clinic.    He is prior patient of Dr. Garcia and Dr. Hodges, last seeing them in 2015. I reviewed his surgical history.  His last surgery with Dr. Garcia in 2015 included revision PORP and removal of a small amount of residual cholesteatoma.  A tube has been in place in that ear since.  The right ear developed an effusion and Dr. Hodges placed a PE tube, removed it when the patient felt that it negatively impacted his hearing, and then placed a paper patch.    He comes today stating that the hearing in his left ear has worsened over the past year.  For a while he found that he was able to manipulate his auricle and his hearing would improve, but for the last several weeks he has been unable to do this and the hearing has been particularly poor in the left ear.  His right ear hearing seems stable, however he has been unable to pop the ear as he had been able to do in the past.    Past Medical History:   Diagnosis Date     HL (hearing loss)      Past Surgical History:   Procedure Laterality Date     COLONOSCOPY  2010    every 5 years/ DR Daugherty     HAND SURGERY  2/2012    thumb     HERNIA REPAIR  1990's    left     LASER DIODE STAPEDOTOMY Left 1/12/2015    Procedure: LASER DIODE STAPEDOTOMY;  Surgeon: Tasha Garcia MD;  Location: [facility] OR     STRESS TEST  age 48    WNL     TONSILLECTOMY  age 8     TYMPANOMASTOIDECTOMY  2010/2011    facial nerve tumors     TYMPANOMASTOIDECTOMY WITH FACIAL MONITORING  12/19/2011    Procedure:TYMPANOMASTOIDECTOMY WITH FACIAL MONITORING; Left Revision Tympanomastoidectomy with Partial  Ossicular Chain Reconstruction, using Facial Nerve Monitoring **latex safe room; Surgeon:TASHA GARCIA; Location:[facility] OR        Allergies   Allergen Reactions     Nkda [No Known Drug Allergies]      Current Outpatient Medications   Medication     ACETAMINOPHEN PO     ibuprofen (ADVIL/MOTRIN) 200 MG capsule     No  "current facility-administered medications for this visit.      Social History     Tobacco Use     Smoking status: Never Smoker     Smokeless tobacco: Never Used   Substance Use Topics     Alcohol use: No     Comment: 2-3 drinks per week     Drug use: No     Family History   Problem Relation Age of Onset     Respiratory Father 81        copd       Hypertension Father      Breast Cancer Mother 60         67     Hypertension Mother      Hypertension Sister      Hypertension Brother      Diabetes No family hx of      Patient Supplied Answers to Review of Systems  [facility] ROS 2020   Gastrointestinal/Genitourinary Heartburn/indigestion   10 point ROS otherwise negative    On physical exam:  BP (!) 173/96   Pulse 73   Temp 98  F (36.7  C)   Resp 17   Ht 1.702 m (5' 7\")   Wt 91.6 kg (202 lb)   SpO2 98%   BMI 31.64 kg/m     Gen: no acute distress, unaccompanied  Resp: no stridor, stertor, cough  Psych: normal affect  Neuro: normal facial nerve function  Ears: normal pinna, postauricular scar    Ear microscope exam:  Tight tympanic membrane is atelectatic in his posterior aspect and is retracted down onto what appears to be a necrotic incus and stapes superstructure.  The chorda tympani nerve is draped with the tympanic membrane.  There is a small perforation of the tympanic membrane just posterior to the stapes capitulum.  There is no middle ear effusion.    On the left side, there is a complete impaction of cerumen out to the meatus with an indentation that is the shape of a cotton swab.  I elevated this underneath the microscope using a right angle, alligator, and multiple suctions.  Medial to the cerumen impaction there is a infection consisting of atilio purulent debris and moist squamous debris stuck to the tympanic membrane I suctioned all of this off and uncovered a pressure equalization tube in the anterior superior aspect that has a granuloma filling it.  Suction at 2 and it would not allow itself " to be freed and opened.  The surrounding skin is quite friable and also blood during the cleaning.    Impression and Plan:  1. Left cerumen impaction  2. Postobstructive acute otitis externa  3. Occluded PE tube  4. Prior left cholesteatoma, PORP in place  5. Right chronic otitis media with retraction and perforation.    We are going to start Ciprodex on the left ear.  Noticing that his hearing improved with manipulation of the auricle suggest that he was moving the cerumen impaction around it has been accumulating over time.  His tube is occluded.  We will start with Ciprodex for 2 weeks and dry ear precautions.  I suspect the tube will remain occluded and we will need to remove it, let things heal, and then replace it at a future visit.  After the completion of the infection treatment we will have him get an audiogram and see me back in the clinic for a recheck.  We can determine nect  steps from there.  The hole in the right ear is acting like a microtube and he will not be able to pop the ear accordingly and at least it is potentially preventing further progression.     Joy Moore MD  Otology & Neurotology  [facility]

## 2020-11-20 ENCOUNTER — OFFICE VISIT (OUTPATIENT)
Dept: AUDIOLOGY | Facility: CLINIC | Age: 65
End: 2020-11-20
Attending: OTOLARYNGOLOGY
Payer: COMMERCIAL

## 2020-11-20 ENCOUNTER — OFFICE VISIT (OUTPATIENT)
Dept: OTOLARYNGOLOGY | Facility: CLINIC | Age: 65
End: 2020-11-20
Payer: COMMERCIAL

## 2020-11-20 VITALS
RESPIRATION RATE: 17 BRPM | DIASTOLIC BLOOD PRESSURE: 94 MMHG | WEIGHT: 200 LBS | BODY MASS INDEX: 31.39 KG/M2 | SYSTOLIC BLOOD PRESSURE: 177 MMHG | TEMPERATURE: 98.1 F | OXYGEN SATURATION: 98 % | HEIGHT: 67 IN | HEART RATE: 90 BPM

## 2020-11-20 DIAGNOSIS — H90.6 MIXED HEARING LOSS, BILATERAL: Primary | ICD-10-CM

## 2020-11-20 DIAGNOSIS — H90.A12 CONDUCTIVE HEARING LOSS OF LEFT EAR WITH RESTRICTED HEARING OF RIGHT EAR: Primary | ICD-10-CM

## 2020-11-20 DIAGNOSIS — H90.A12 CONDUCTIVE HEARING LOSS OF LEFT EAR WITH RESTRICTED HEARING OF RIGHT EAR: ICD-10-CM

## 2020-11-20 PROCEDURE — 92557 COMPREHENSIVE HEARING TEST: CPT | Performed by: AUDIOLOGIST

## 2020-11-20 PROCEDURE — 92504 EAR MICROSCOPY EXAMINATION: CPT | Performed by: OTOLARYNGOLOGY

## 2020-11-20 PROCEDURE — 99213 OFFICE O/P EST LOW 20 MIN: CPT | Mod: 25 | Performed by: OTOLARYNGOLOGY

## 2020-11-20 PROCEDURE — 92567 TYMPANOMETRY: CPT | Performed by: AUDIOLOGIST

## 2020-11-20 ASSESSMENT — MIFFLIN-ST. JEOR: SCORE: 1640.82

## 2020-11-20 ASSESSMENT — PAIN SCALES - GENERAL: PAINLEVEL: NO PAIN (0)

## 2020-11-20 NOTE — LETTER
"11/20/2020       RE: Lennox Acuna  [address] [facility] [address]     Dear Colleague,    Thank you for referring your patient, Lennox Acuna, to the [facility] E[facility] NOSE AND THROAT CLINIC [place] at [facility]. Please see a copy of my visit note below.    November 20, 2020    I had the pleasure of seeing Lennox Acuna today in follow up in the Neurotology Clinic.    He is prior patient of Dr. Garcia and Dr. Hodges, last seeing them in 2015.  I reviewed his surgical history.  His last surgery with Dr. Garcia in 2015 included revision PORP and removal of a small amount of residual cholesteatoma.  A tube has been in place in that ear since.  The right ear developed an effusion and Dr. Hodges placed a PE tube, removed it when the patient felt that it negatively impacted his hearing, and then placed a paper patch.    He came in 3 weeks ago stating that the hearing in his left ear had worsened over the past year.  For a while he found that he was able to manipulate his auricle and his hearing would improve.   His right ear hearing seems stable, however he has been unable to pop the ear as he had been able to do in the past.  He had a left cerumen impaction.  His left PE tube was occluded.  He has used drops.  The ear still feels full but better than before.     Patient Supplied Answers to Review of Systems  [facility] ROS 11/20/2020   Gastrointestinal/Genitourinary Heartburn/indigestion   10 point ROS otherwise negative    On physical exam:  BP (!) 177/94   Pulse 90   Temp 98.1  F (36.7  C)   Resp 17   Ht 1.702 m (5' 7\")   Wt 90.7 kg (200 lb)   SpO2 98%   BMI 31.32 kg/m     Gen: no acute distress, unaccompanied  Resp: no stridor, stertor, cough  Psych: normal affect  Neuro: normal facial nerve function  Ears: normal pinna, postauricular scar    Ear microscope exam:  Right tympanic membrane remains atelectatic in his posterior aspect and is retracted down " onto what appears to be a necrotic incus and stapes superstructure.  The chorda tympani nerve is draped with the tympanic membrane.  There is a small perforation of the tympanic membrane just posterior to the stapes capitulum, stable.  There is no middle ear effusion.    On the left side, the canal has minimal debris and no infection. There is a pressure equalization tube in the anterior superior aspect that has dry scar filling it. Granuloma has resolved but tube is plugged.    Audiogram  Normal sloping to moderately-severe mixed hearing loss left ear. Mild to profound mixed hearing loss right ear. 25 dB improvement in threshold at 250 Hz and 15-25 dB decrease at 4-6 kHz right ear and 20 dB improvement at 1 kHz left ear re: audio 9/11/15. Flat tymps bilaterally. Did not test reflexes due to abnormal tymps.    Impression and Plan:  1. Left cerumen impaction, resolved  2. Postobstructive acute otitis externa, resolvd  3. Occluded PE tube - removed in clinic today  4. Prior left cholesteatoma, PORP in place, stable  5. Right chronic otitis media with retraction and perforation, stable    After discussing the pros and cons in great detail, the patient elected to have the left tube removed.  I grasped it with an alligator and removed it. The middle ear space was aerated.  He will use drops for 3 days and will keep water out of the ear.  He understands removal of the tube is not to improve his hearing but to prevent ongoing TM retraction since it was totally plugged.  The hole in the right ear is acting like a microtube and he will not be able to pop the ear accordingly and at least it is potentially preventing further progression as well.    RTC 3 months with audiogram.       Joy Moore MD  Otology & Neurotology  [facility]

## 2020-11-20 NOTE — NURSING NOTE
"Chief Complaint   Patient presents with     RECHECK     follow up      Blood pressure (!) 177/94, pulse 90, temperature 98.1  F (36.7  C), resp. rate 17, height 1.702 m (5' 7\"), weight 90.7 kg (200 lb), SpO2 98 %.    Robb Wiggins LPN    "

## 2020-11-20 NOTE — PROGRESS NOTES
AUDIOLOGY REPORT    SUMMARY: Audiology visit completed. See audiogram for results.      RECOMMENDATIONS: Follow-up with ENT.    Larissa Diggs, [facility]  Licensed Audiologist  [place] License #[id number]

## 2020-11-20 NOTE — PATIENT INSTRUCTIONS
1. You were seen in the ENT Clinic today by Dr. Moore.  If you have any questions or concerns after your appointment, please call   - Option 1: ENT Clinic: [phone]   - Option 2: Mindy (Dr. Moore'  Nurse): [phone]  2.   Plan to return to clinic in 3 months with audio    3. Continue drops from home in left ear x 3 nights    4. Keep ear dry- if showering put Vaseline on cotton ball face down inside ear    Mindy Enrique LPN  [facility] - Otolaryngology

## 2020-12-17 ENCOUNTER — OFFICE VISIT (OUTPATIENT)
Dept: FAMILY MEDICINE | Facility: CLINIC | Age: 65
End: 2020-12-17

## 2020-12-17 VITALS
HEART RATE: 90 BPM | TEMPERATURE: 98.2 F | DIASTOLIC BLOOD PRESSURE: 88 MMHG | HEIGHT: 67 IN | WEIGHT: 197 LBS | OXYGEN SATURATION: 94 % | SYSTOLIC BLOOD PRESSURE: 130 MMHG | BODY MASS INDEX: 30.92 KG/M2

## 2020-12-17 DIAGNOSIS — Z87.19 HISTORY OF DIVERTICULITIS: ICD-10-CM

## 2020-12-17 DIAGNOSIS — K21.00 GASTROESOPHAGEAL REFLUX DISEASE WITH ESOPHAGITIS WITHOUT HEMORRHAGE: Primary | ICD-10-CM

## 2020-12-17 DIAGNOSIS — K52.9 FREQUENT STOOLS: ICD-10-CM

## 2020-12-17 PROCEDURE — G0009 ADMIN PNEUMOCOCCAL VACCINE: HCPCS | Performed by: PHYSICIAN ASSISTANT

## 2020-12-17 PROCEDURE — 99213 OFFICE O/P EST LOW 20 MIN: CPT | Mod: 25 | Performed by: PHYSICIAN ASSISTANT

## 2020-12-17 PROCEDURE — 90732 PPSV23 VACC 2 YRS+ SUBQ/IM: CPT | Performed by: PHYSICIAN ASSISTANT

## 2020-12-17 RX ORDER — OMEPRAZOLE 40 MG/1
40 CAPSULE, DELAYED RELEASE ORAL DAILY
Qty: 30 CAPSULE | Refills: 1 | Status: SHIPPED | OUTPATIENT
Start: 2020-12-17 | End: 2021-10-07

## 2020-12-17 RX ORDER — OMEPRAZOLE 10 MG/1
20 CAPSULE, DELAYED RELEASE ORAL DAILY
COMMUNITY
End: 2021-10-07

## 2020-12-17 ASSESSMENT — MIFFLIN-ST. JEOR: SCORE: 1627.22

## 2020-12-17 NOTE — NURSING NOTE
Lennox is here for can't swallow food all the way down, has heartburn, trouble breathing, breathes heavily.        Pre-visit Screening:  Immunizations:  not up to date - pneum 23   Colonoscopy:  is up to date  Mammogram: NA  Asthma Action Test/Plan:  NA  PHQ9:  NA  GAD7:  NA  Questioned patient about current smoking habits Pt. has never smoked.  Ok to leave detailed message on voice mail for today's visit only Yes, phone # [phone]

## 2020-12-17 NOTE — PROGRESS NOTES
"CC: Heart burn, swallowing issues    History:  For the past 6 months, Lennox has been noticing intermittent painful swallowing beneath sternum. Happens 1-2 times per week. Usually when eating food, as liquids aren't an issue. Pain lasts for several seconds before it resolves. Feels like it is stuck there until it moves. In general doesn't have reflux in past years, but has been having some more heartburn in the past month or so. Has taken Zantac in the past, Maalax. No vomiting up. Has to sit up sometimes to help symptoms. No smoking history. No change in stool color.     Has been having frequent bowel movements in the morning ever since he had episode of diverticulitis in 2018.     PMH, MEDICATIONS, ALLERGIES, SOCIAL AND FAMILY HISTORY in [facility] and reviewed by me personally.    ROS negative other than the symptoms noted above in the HPI.    Examination   /88   Pulse 90   Temp 98.2  F (36.8  C) (Oral)   Ht 1.702 m (5' 7\")   Wt 89.4 kg (197 lb)   SpO2 94%   BMI 30.85 kg/m       Constitutional: Sitting comfortably, in no acute distress. Vital signs noted  Mouth and throat: without erythema or lesions of the mucosa  Neck:  no adenopathy, trachea midline and normal to palpation, thyroid normal to palpation  Cardiovascular:  regular rate and rhythm, no murmurs, clicks, or gallops  Respiratory:  normal respiratory rate and rhythm, lungs clear to auscultation  Abdomen: Abdomen soft, non-tender. BS normal. No masses, organomegaly  SKIN: No jaundice/pallor/rash.   Psychiatric: mentation appears normal and affect normal/bright        A/P    ICD-10-CM    1. Gastroesophageal reflux disease with esophagitis without hemorrhage  K21.00 omeprazole (PRILOSEC) 40 MG  capsule       DISCUSSION:  GERD:  Symptoms are consistent with significant GERD, but cannot rule out underlying swallowing disorder. I would like him to start trial of omeprazole 40 mg daily taken 30-60 minutes before meal. Discussed lifestyle changes, foods " to avoid. Contact me next week with an update. Would like him to eventually follow-up with GI to review both chronic stool changes, and consider EGD likely with biopsies, but pt is not interested in this at this time.     follow up visit: As needed    ERNESTO [facility]

## 2020-12-18 PROBLEM — Z87.19 HISTORY OF DIVERTICULITIS: Status: ACTIVE | Noted: 2020-12-18

## 2020-12-18 PROBLEM — K57.92 ACUTE DIVERTICULITIS: Status: RESOLVED | Noted: 2018-02-25 | Resolved: 2020-12-18

## 2020-12-18 PROBLEM — K21.00 GASTROESOPHAGEAL REFLUX DISEASE WITH ESOPHAGITIS WITHOUT HEMORRHAGE: Status: ACTIVE | Noted: 2020-12-18

## 2021-01-15 ENCOUNTER — HEALTH MAINTENANCE LETTER (OUTPATIENT)
Age: 66
End: 2021-01-15

## 2021-02-15 NOTE — PATIENT INSTRUCTIONS
1. You were seen in the ENT Clinic today by Dr. Moore.  If you have any questions or concerns after your appointment, please call   - Option 1: ENT Clinic: [phone]  - Option 2: Erica (Dr. Moore): [phone]    2.   Plan to return to clinic in 6 months with a hearing test.    3.  Hearing aid evaluation, and hearing aid clearance letter attached.     Erica Sinha RN  Care Coordinator   Health- Otolaryngology  [phone]    Mindy Enrique LPN   Health- Otolaryngology  [phone]

## 2021-02-23 ENCOUNTER — OFFICE VISIT (OUTPATIENT)
Dept: OTOLARYNGOLOGY | Facility: CLINIC | Age: 66
End: 2021-02-23
Payer: COMMERCIAL

## 2021-02-23 ENCOUNTER — OFFICE VISIT (OUTPATIENT)
Dept: AUDIOLOGY | Facility: CLINIC | Age: 66
End: 2021-02-23
Payer: COMMERCIAL

## 2021-02-23 VITALS
TEMPERATURE: 98.2 F | WEIGHT: 209.44 LBS | DIASTOLIC BLOOD PRESSURE: 99 MMHG | HEART RATE: 81 BPM | SYSTOLIC BLOOD PRESSURE: 180 MMHG | HEIGHT: 67 IN | RESPIRATION RATE: 17 BRPM | OXYGEN SATURATION: 96 % | BODY MASS INDEX: 32.87 KG/M2

## 2021-02-23 DIAGNOSIS — H90.6 MIXED HEARING LOSS, BILATERAL: Primary | ICD-10-CM

## 2021-02-23 DIAGNOSIS — H90.A12 CONDUCTIVE HEARING LOSS OF LEFT EAR WITH RESTRICTED HEARING OF RIGHT EAR: Primary | ICD-10-CM

## 2021-02-23 PROCEDURE — 99213 OFFICE O/P EST LOW 20 MIN: CPT | Mod: 25 | Performed by: OTOLARYNGOLOGY

## 2021-02-23 PROCEDURE — 92557 COMPREHENSIVE HEARING TEST: CPT | Performed by: AUDIOLOGIST

## 2021-02-23 PROCEDURE — 92550 TYMPANOMETRY & REFLEX THRESH: CPT | Performed by: AUDIOLOGIST

## 2021-02-23 PROCEDURE — 69433 CREATE EARDRUM OPENING: CPT | Mod: 50 | Performed by: OTOLARYNGOLOGY

## 2021-02-23 ASSESSMENT — MIFFLIN-ST. JEOR: SCORE: 1683.63

## 2021-02-23 ASSESSMENT — PAIN SCALES - GENERAL: PAINLEVEL: MILD PAIN (2)

## 2021-02-23 NOTE — PROGRESS NOTES
AUDIOLOGY REPORT    SUMMARY: Audiology visit completed. See audiogram for results.      RECOMMENDATIONS: Follow-up with ENT.    Kishore Vee CCC-A  Licensed Audiologist   [place] #[id number]

## 2021-02-23 NOTE — NURSING NOTE
"Chief Complaint   Patient presents with     RECHECK     follow up      Blood pressure (!) 180/99, pulse 81, temperature 98.2  F (36.8  C), resp. rate 17, height 1.702 m (5' 7\"), weight 95 kg (209 lb 7 oz), SpO2 96 %.    Robb Wiggins LPN    "

## 2021-02-23 NOTE — LETTER
"2/23/2021       RE: Lennox Acuna  [address] [place] [address] [address]     Dear Colleague,    Thank you for referring your patient, Lennox Acuna, to the [facility] [place] at [facility]. Please see a copy of my visit note below.    February 23, 2021      I had the pleasure of seeing Lennox Acuna today in follow up in the Neurotology Clinic.    He is prior patient of Dr. Garcia and Dr. Hodges, last seeing them in 2015. We last saw him 11/20/20 where the left PE tube was removed due to infection and presence of granulation tissue. I reviewed his surgical history.  His last surgery with Dr. Garcia in 2015 included revision PORP and removal of a small amount of residual cholesteatoma.  A tube has been in place in that ear since.  The right ear developed an effusion and Dr. Hodges placed a PE tube, removed it when the patient felt that it negatively impacted his hearing, and then placed a paper patch.    The patient feels that his hearing has gradually declined in both ears, but it is not to the same degree as it was before his PORP placement. He otherwise denies changes including new tinnitus, vertigo, and otorrhea and otalgia.     10 point ROS otherwise negative     On physical exam:  BP (!) 177/94   Pulse 90   Temp 98.1  F (36.7  C)   Resp 17   Ht 1.702 m (5' 7\")   Wt 90.7 kg (200 lb)   SpO2 98%   BMI 31.32 kg/m     Gen: no acute distress, unaccompanied  Resp: no stridor, stertor, cough  Psych: normal affect  Neuro: normal facial nerve function  Ears: normal pinna, postauricular scar     Ear microscope exam:  Right tympanic membrane remains atelectatic in his posterior aspect and is retracted down. The previous posterior perforation has now healed and the drum overall appears retracted, but there is no evidence of of middle ear effusion or squamous debris.      On the left side, the canal has minimal debris and no " infection. The site of the previous PE tube has healed with associated scar, as well as a directly inferior area of tympanosclerosis. There is a mild amount of epitympanic retraction, but the middle ear is otherwise clear and there is no evidence of squamous debris.     Audiogram: 2/23/2021      Right ear: mild to severe mixed hearing loss   Left ear: mild to borderline severe mixed hearing loss   SRT right: 40 dB left: 40 dB  WR right: 100% left: 100%   Acoustic Reflexes: present bilaterally with ipsilateral and contralateral stimulation  Tympanograms: type C right, type A left     This is compared to his audiogram from November 2020. Overall his hearing is relatively stable. Of note, his previous flat tympanograms have resolved.     Impression and Plan:  1. Eustachian tube dysfunction  2. Prior left cholesteatoma, PORP in place, stable     After discussing the pros and cons in great detail, the patient elected for bilateral PE tube placement. The benefits of PE tube placement given his Eustachian tube dysfunction and now resolved bilateral perforations were discussed multiple times, including the anticipated decline in hearing. Understanding was verbalized by the patient given that during today's appointment he expressed frustration with his prior PE tube placement. We also discussed that he could elect for observation, with the understanding that this could result in further retraction of the tympanic membrane with further damage to the ossicles. After extensive discussion, the patient agreed to bilateral PE tube placement.  He is also interested at this time with hearing aid evaluation. We discussed that he may be a better candidate for over the ear hearing aids given PE tube placement. We will provide him a hearing aid clearance letter and referral for Audiology here, per his request.      Procedure:  Informed consent was obtained and signed.  Time Out was conducted with confirmation of the patient's name, side  of the procedure and procedure to be done.  The left ear was examined under the microscope.  Phenol was placed on the anterior inferior quadrant.  Myringotomy incision was made and lester bobbin tube was placed.  The patient tolerated the procedure well with no complications. The right ear was examined under the microscope.  Phenol was placed on the anterior inferior quadrant.  Myringotomy incision was made and lester bobbin tube was placed.  The patient tolerated the procedure well with no complications.    RCT 6 months with audiogram.    Karol Mcarthur MD PGY2  Otolaryngology - Head & Neck Surgery     Joy Moore MD  Otology & Neurotology  [facility]    I, Joy Moore MD, saw this patient with the resident/fellow and agree with the resident s findings and plan of care as documented in the resident s/fellow s note. I was present for the entire procedure.        Again, thank you for allowing me to participate in the care of your patient.      Sincerely,    Joy Moore MD

## 2021-02-23 NOTE — PROGRESS NOTES
"February 23, 2021      I had the pleasure of seeing Lennox Acuna today in follow up in the Neurotology Clinic.    He is prior patient of Dr. Garcia and Dr. Hodges, last seeing them in 2015. We last saw him 11/20/20 where the left PE tube was removed due to infection and presence of granulation tissue. I reviewed his surgical history.  His last surgery with Dr. Garcia in 2015 included revision PORP and removal of a small amount of residual cholesteatoma.  A tube has been in place in that ear since.  The right ear developed an effusion and Dr. Hodges placed a PE tube, removed it when the patient felt that it negatively impacted his hearing, and then placed a paper patch.    The patient feels that his hearing has gradually declined in both ears, but it is not to the same degree as it was before his PORP placement. He otherwise denies changes including new tinnitus, vertigo, and otorrhea and otalgia.     10 point ROS otherwise negative     On physical exam:  BP (!) 177/94   Pulse 90   Temp 98.1  F (36.7  C)   Resp 17   Ht 1.702 m (5' 7\")   Wt 90.7 kg (200 lb)   SpO2 98%   BMI 31.32 kg/m     Gen: no acute distress, unaccompanied  Resp: no stridor, stertor, cough  Psych: normal affect  Neuro: normal facial nerve function  Ears: normal pinna, postauricular scar     Ear microscope exam:  Right tympanic membrane remains atelectatic in his posterior aspect and is retracted down. The previous posterior perforation has now healed and the drum overall appears retracted, but there is no evidence of of middle ear effusion or squamous debris.      On the left side, the canal has minimal debris and no infection. The site of the previous PE tube has healed with associated scar, as well as a directly inferior area of tympanosclerosis. There is a mild amount of epitympanic retraction, but the middle ear is otherwise clear and there is no evidence of squamous debris.     Audiogram: 2/23/2021      Right ear: mild to severe " mixed hearing loss   Left ear: mild to borderline severe mixed hearing loss   SRT right: 40 dB left: 40 dB  WR right: 100% left: 100%   Acoustic Reflexes: present bilaterally with ipsilateral and contralateral stimulation  Tympanograms: type C right, type A left     This is compared to his audiogram from November 2020. Overall his hearing is relatively stable. Of note, his previous flat tympanograms have resolved.     Impression and Plan:  1. Eustachian tube dysfunction  2. Prior left cholesteatoma, PORP in place, stable     After discussing the pros and cons in great detail, the patient elected for bilateral PE tube placement. The benefits of PE tube placement given his Eustachian tube dysfunction and now resolved bilateral perforations were discussed multiple times, including the anticipated decline in hearing. Understanding was verbalized by the patient given that during today's appointment he expressed frustration with his prior PE tube placement. We also discussed that he could elect for observation, with the understanding that this could result in further retraction of the tympanic membrane with further damage to the ossicles. After extensive discussion, the patient agreed to bilateral PE tube placement.  He is also interested at this time with hearing aid evaluation. We discussed that he may be a better candidate for over the ear hearing aids given PE tube placement. We will provide him a hearing aid clearance letter and referral for Audiology here, per his request.      Procedure:  Informed consent was obtained and signed.  Time Out was conducted with confirmation of the patient's name, side of the procedure and procedure to be done.  The left ear was examined under the microscope.  Phenol was placed on the anterior inferior quadrant.  Myringotomy incision was made and lester bobbin tube was placed.  The patient tolerated the procedure well with no complications. The right ear was examined under the  microscope.  Phenol was placed on the anterior inferior quadrant.  Myringotomy incision was made and lester bobbin tube was placed.  The patient tolerated the procedure well with no complications.    RCT 6 months with audiogram.    Karol Mcarthur MD PGY2  Otolaryngology - Head & Neck Surgery     Joy Moore MD  Otology & Neurotology  [facility]    I, Joy Moore MD, saw this patient with the resident/fellow and agree with the resident s findings and plan of care as documented in the resident s/fellow s note. I was present for the entire procedure.

## 2021-02-23 NOTE — LETTER
Hearing Aid Medical Clearance    Lennox JIMENEZ Armand  February 23, 2021        This patient has received a medical examination and may be considered a suitable candidate for a hearing aid.         Physician:__________________________________________________    Joy Moore MD

## 2021-03-08 ENCOUNTER — IMMUNIZATION (OUTPATIENT)
Dept: NURSING | Facility: CLINIC | Age: 66
End: 2021-03-08
Payer: COMMERCIAL

## 2021-03-08 PROCEDURE — 91303 PR COVID VAC JANSSEN AD26 0.5ML: CPT

## 2021-03-08 PROCEDURE — 0031A PR COVID VAC JANSSEN AD26 0.5ML: CPT

## 2021-03-08 NOTE — PROGRESS NOTES
AUDIOLOGY REPORT    SUBJECTIVE: Lennox Acuna is a 65 year old male was seen in the Audiology Clinic at  [facility] on 3/09/21 to discuss concerns with hearing and functional communication difficulties.Lennox has been seen previously on 2/23/21, and results revealed a mild through 6 kHz sloping to severe mixed hearing loss in the left ear and mild to profound mixed hearing loss in the right. Lennox's history is significant for multiple infections and effusion, the placement of multiple PE tubes , and ear surgeries. The most recent ear surgery, a left revision PORP and removal of a small amount of residual cholesteatoma, was completed in 2015 The patient was medically evaluated and determined to be cleared for binaural hearing aids by Dr. Moore. Due to his history, Dr. Moore recommended a behind the ear style hearing aid.  Lennox notes difficulty with communication in a variety of listening situations.    OBJECTIVE:  Patient is a hearing aid candidate.He currently has hearing aid benefits through [employer]. We discussed how our clinic is not a [employer] provider, but we do have similar options if that is something he is interested in pursuing. Patient's general questions were answered. At this time, he would like to look into his [employer] benefit first. He did not wish to proceed with a hearing aid evaluation today. Lennox was given a copy of his hearing test and contact information for [employer].     Lennox also had questions regarding the PE tube in his ear and water. He was given contact information for the ENT clinic as well as Dr. Moore' nurse to discuss this further.     ASSESSMENT:   Patient would like to look into [employer] benefit. Therefore, he declined to proceed with a hearing aid evaluation. Today's appointment is at no cost, as no services were rendered.      PLAN: Lennox was encouraged to contact this clinic with any questions or concerns. If he wishes to  pursue hearing aid services here he will return for a hearing aid consultation. It is recommended he reach out to Dr. Moore' nurse to discuss his questions regarding his PE tubes.     Larissa Vee. CCC-A  Licensed Audiologist   [place] #[id number]

## 2021-03-09 ENCOUNTER — OFFICE VISIT (OUTPATIENT)
Dept: AUDIOLOGY | Facility: CLINIC | Age: 66
End: 2021-03-09
Payer: COMMERCIAL

## 2021-03-09 DIAGNOSIS — H90.A12 CONDUCTIVE HEARING LOSS OF LEFT EAR WITH RESTRICTED HEARING OF RIGHT EAR: ICD-10-CM

## 2021-03-09 DIAGNOSIS — H90.6 MIXED HEARING LOSS, BILATERAL: Primary | ICD-10-CM

## 2021-03-09 PROCEDURE — 99207 PR ASSESSMENT FOR HEARING AID: CPT | Performed by: AUDIOLOGIST

## 2021-05-10 ENCOUNTER — TELEPHONE (OUTPATIENT)
Dept: OTOLARYNGOLOGY | Facility: CLINIC | Age: 66
End: 2021-05-10

## 2021-05-10 NOTE — TELEPHONE ENCOUNTER
Dr. Moore indicated on her note she wanted a follow up hearing test and appt in 6 months. Called and explained this and he is ok with keeping it.    Mindy Enrique LPN

## 2021-05-10 NOTE — TELEPHONE ENCOUNTER
[facility] Call Center    Phone Message    May a detailed message be left on voicemail: yes     Reason for Call: pt is wondering if he needs to come to either appointments in August as he now has hearing aids - please call and let him know if the appointments would be needed.      Action Taken: Message routed to:  [facility] ENT    Travel Screening: Not Applicable

## 2021-08-04 ENCOUNTER — TELEPHONE (OUTPATIENT)
Dept: OTOLARYNGOLOGY | Facility: CLINIC | Age: 66
End: 2021-08-04

## 2021-09-04 ENCOUNTER — HEALTH MAINTENANCE LETTER (OUTPATIENT)
Age: 66
End: 2021-09-04

## 2021-10-07 ENCOUNTER — OFFICE VISIT (OUTPATIENT)
Dept: FAMILY MEDICINE | Facility: CLINIC | Age: 66
End: 2021-10-07

## 2021-10-07 VITALS
TEMPERATURE: 97.6 F | WEIGHT: 195 LBS | HEART RATE: 76 BPM | BODY MASS INDEX: 30.61 KG/M2 | OXYGEN SATURATION: 98 % | SYSTOLIC BLOOD PRESSURE: 130 MMHG | HEIGHT: 67 IN | DIASTOLIC BLOOD PRESSURE: 88 MMHG

## 2021-10-07 DIAGNOSIS — Z12.5 SCREENING FOR PROSTATE CANCER: ICD-10-CM

## 2021-10-07 DIAGNOSIS — Z13.220 SCREENING FOR LIPID DISORDERS: ICD-10-CM

## 2021-10-07 DIAGNOSIS — Z13.228 SCREENING FOR METABOLIC DISORDER: ICD-10-CM

## 2021-10-07 DIAGNOSIS — R73.03 PREDIABETES: ICD-10-CM

## 2021-10-07 DIAGNOSIS — Z00.00 ENCOUNTER FOR GENERAL HEALTH EXAMINATION: Primary | ICD-10-CM

## 2021-10-07 LAB — HBA1C MFR BLD: 6.2 % (ref 4–7)

## 2021-10-07 PROCEDURE — 80061 LIPID PANEL: CPT | Performed by: PHYSICIAN ASSISTANT

## 2021-10-07 PROCEDURE — 83036 HEMOGLOBIN GLYCOSYLATED A1C: CPT | Performed by: PHYSICIAN ASSISTANT

## 2021-10-07 PROCEDURE — 36415 COLL VENOUS BLD VENIPUNCTURE: CPT | Performed by: PHYSICIAN ASSISTANT

## 2021-10-07 PROCEDURE — 99397 PER PM REEVAL EST PAT 65+ YR: CPT | Performed by: PHYSICIAN ASSISTANT

## 2021-10-07 PROCEDURE — 80053 COMPREHEN METABOLIC PANEL: CPT | Performed by: PHYSICIAN ASSISTANT

## 2021-10-07 ASSESSMENT — MIFFLIN-ST. JEOR: SCORE: 1605.2

## 2021-10-07 NOTE — PROGRESS NOTES
Lennox Acuna is a 66 year old male presents for routine health maintenance.    Current concerns: Would like to update fasting labs. Has been noticing some higher BP at home especially stressed with wife's health. No chest pain, palpitations, SOB.     Body mass index is 31 kg/m .    Present exercise habits:  5-7 times/week, golf, beanbags, wii bowling  Present dietary habits:  eats regular meals and follows a balanced nutrition diet    Vit D intake: is not taking supplement    Is the patient a smoker? No  If yes, smoking cessation advised and counseling provided.     Cardiovascular risk factors: none    Over the past few weeks, have you felt down or depressed? Little interest or pleasure in doing things? No concerns, just stressed with wife's health.     Last dental appointment:  this year  Last optical appointment:  2 years ago    Was the patient born between [birth year] and has not had Hep C testing?  Has not been tested, declines testing    I have reviewed the following histories: Past Medical History, Past Surgical History, Social History, Family History, Problem List, Medication List and Allergies    Past Medical History:   Diagnosis Date     HL (hearing loss)      Family History   Problem Relation Age of Onset     Respiratory Father 81        copd       Hypertension Father      Breast Cancer Mother 60         67     Hypertension Mother      Hypertension Sister      Hypertension Brother      Diabetes No family hx of      Social History     Socioeconomic History     Marital status:      Spouse name: Not on file     Number of children: 4     Years of education: Not on file     Highest education level: Not on file   Occupational History     Employer: [employer]   Tobacco Use     Smoking status: Never Smoker     Smokeless tobacco: Never Used   Substance and Sexual Activity     Alcohol use: No     Comment: 2-3 drinks per week     Drug use: No     Sexual activity: Yes     Partners: Female     Birth  control/protection: Post-menopausal   Other Topics Concern      Service No     Blood Transfusions No     Caffeine Concern No     Occupational Exposure No     Hobby Hazards No     Sleep Concern No     Stress Concern Yes     Comment: wife not working     Weight Concern No     Special Diet No     Back Care No     Exercise Yes     Comment: manages a store/ active     Bike Helmet Yes     Seat Belt Yes     Self-Exams Not Asked   Social History Narrative     Not on file     Social Determinants of Health     Financial Resource Strain:      Difficulty of Paying Living Expenses:    Food Insecurity:      Worried About Running Out of Food in the Last Year:      Ran Out of Food in the Last Year:    Transportation Needs:      Lack of Transportation (Medical):      Lack of Transportation (Non-Medical):    Physical Activity:      Days of Exercise per Week:      Minutes of Exercise per Session:    Stress:      Feeling of Stress :    Social Connections:      Frequency of Communication with Friends and Family:      Frequency of Social Gatherings with Friends and Family:      Attends Spiritism Services:      Active Member of Clubs or Organizations:      Attends Club or Organization Meetings:      Marital Status:    Intimate Partner Violence:      Fear of Current or Ex-Partner:      Emotionally Abused:      Physically Abused:      Sexually Abused:      Patient Active Problem List   Diagnosis     Conductive hearing loss in left ear     Cholesteatoma     History of gastric ulcer     Health Care Home (V65.8)     ACP (advance care planning)     Internal derangement of knee     History of diverticulitis     Gastroesophageal reflux disease with esophagitis without hemorrhage     Current Outpatient Medications   Medication     ACETAMINOPHEN PO     ibuprofen (ADVIL/MOTRIN) 200 MG capsule     No current facility-administered medications for this visit.       Allergies:  Allergies   Allergen Reactions     Nkda [No Known Drug Allergies]   "        ROS:  E/M: NEGATIVE for ear, nose, mouth and throat problems  R: NEGATIVE for significant/chronic cough or SOB  CV: NEGATIVE for chest pain or palpitations  GI: NEGATIVE for abdominal pain, chronic diarrhea or constipation  : NEGATIVE for dysuria, hematuria, weakened urinary stream      OBJECTIVE:    Vitals:    10/07/21 1308   BP: 130/88   BP Location: Left arm   Patient Position: Sitting   Cuff Size: Adult Large   Pulse: 76   Temp: 97.6  F (36.4  C)   TempSrc: Temporal   SpO2: 98%   Weight: 88.5 kg (195 lb)   Height: 1.689 m (5' 6.5\")       General: 66 year old male who appears his stated age. Vital signs noted.  Head: Normocephalic  Eyes: pupils equal round reactive to light and accomodation, extra ocular movements intact  Ears: external canals and tms free of abnormalities  Nose: patent, without mucosal abnormalities  Mouth and throat: without erythema or lesions of the mucosa  Neck: supple, without adenopathy or thyromegaly  Lungs: clear to auscultation, no wheezing or crackles  Cv: regular rate and rhythm, normal s1 and s2 without murmur or click  Abd: soft, non-tender, no masses, no hepatomegaly or splenomegaly.  Gu: normal external genitalia, no hernia  Rectal: Will check PSA  Ms: normal muscle tone & symmetry  Skin: Clear to inspection  Neuro: sensation and motor function grossly intact; cranial nerves without obvious abnormalities.      ASSESSMENT/PLAN:    1. Encounter for general health examination  Lennox is doing relatively well today. Will update fasting labs, PSA, and send MyChart with results. Reassured by normal BP today for age >65. Recommended continue monitor, and bring cuff with to BP appt if still concerning.     2. Prediabetes  - VENOUS COLLECTION  - HEMOGLOBIN A1C ([facility])    3. Screening for prostate cancer  - VENOUS COLLECTION  - PSA Total ([facility])    4. Screening for lipid disorders  - VENOUS COLLECTION  - Lipid Panel ([facility])    5. Screening for metabolic disorder  - VENOUS COLLECTION  - " "Comprehensive Metobolic Panel ([facility])     reports that he has never smoked. He has never used smokeless tobacco.    Estimated body mass index is 31 kg/m  as calculated from the following:    Height as of this encounter: 1.689 m (5' 6.5\").    Weight as of this encounter: 88.5 kg (195 lb).  Weight management plan: Discussed healthy diet and exercise guidelines    Labs pending:      Fasting glucose      Fasting lipids      PSA  Meds Suggested:      Vitamin D       Calcium  Tests Recommended:      Regular Dental Examinations        Eye exam  Behavior Modifications:       Cardiovascular exercise 3 times per week--enough to get your Target Heart rate  Other recommendations:     BMI noted and discussed      Regular testicle exam     Encouraged My Chart    The patient will return to the clinic if symptoms are changing or concern with follow up as discussed. The patient understands and agrees with the plan.    Maria Luisa Aguila PA-C  10/7/2021        Counseling Resources:  ATP IV Guidelines  Pooled Cohorts Equation Calculator  Breast Cancer Risk Calculator  FRAX Risk Assessment  ICSI Preventive Guidelines  Dietary Guidelines for Americans, 2010  USDA's MyPlate    "

## 2021-10-07 NOTE — NURSING NOTE
Chief Complaint   Patient presents with     Physical     fasting         Pre-visit Screening:  Immunizations:  up to date  Colonoscopy:  is up to date  Mammogram: NA  Asthma Action Test/Plan:  NA  PHQ9:  NA  GAD7:  NA  Questioned patient about current smoking habits Pt. has never smoked.  Ok to leave detailed message on voice mail for today's visit only Yes, phone # [phone]

## 2021-10-08 LAB
ABBOTT PSA - QUEST: 0.87 NG/ML
ALBUMIN SERPL-MCNC: 4.2 G/DL (ref 3.6–5.1)
ALBUMIN/GLOB SERPL: 1.4 {RATIO} (ref 1–2.5)
ALP SERPL-CCNC: 111 U/L (ref 33–130)
ALT 1742-6: 16 U/L (ref 0–32)
AST 1920-8: 17 U/L (ref 0–35)
BILIRUB SERPL-MCNC: 0.6 MG/DL (ref 0.2–1.2)
BUN SERPL-MCNC: 16 MG/DL (ref 7–25)
BUN/CREATININE RATIO: 18.8 (ref 6–22)
CALCIUM SERPL-MCNC: 9.5 MG/DL (ref 8.6–10.3)
CHLORIDE SERPLBLD-SCNC: 107.2 MMOL/L (ref 98–110)
CHOLEST SERPL-MCNC: 211 MG/DL (ref 0–199)
CHOLEST/HDLC SERPL: 3 {RATIO} (ref 0–5)
CO2 SERPL-SCNC: 24.3 MMOL/L (ref 20–32)
CREAT SERPL-MCNC: 0.85 MG/DL (ref 0.6–1.3)
GLOBULIN, CALCULATED - QUEST: 2.9 (ref 1.9–3.7)
GLUCOSE SERPL-MCNC: 83 MG/DL (ref 60–99)
HDLC SERPL-MCNC: 65 MG/DL (ref 40–150)
LDLC SERPL CALC-MCNC: 130 MG/DL (ref 0–130)
POTASSIUM SERPL-SCNC: 4.74 MMOL/L (ref 3.5–5.3)
PROT SERPL-MCNC: 7.1 G/DL (ref 6.1–8.1)
SODIUM SERPL-SCNC: 138.8 MMOL/L (ref 135–146)
TRIGL SERPL-MCNC: 78 MG/DL (ref 0–149)

## 2021-10-18 ENCOUNTER — TRANSFERRED RECORDS (OUTPATIENT)
Dept: FAMILY MEDICINE | Facility: CLINIC | Age: 66
End: 2021-10-18

## 2021-11-16 ENCOUNTER — OFFICE VISIT (OUTPATIENT)
Dept: OTOLARYNGOLOGY | Facility: CLINIC | Age: 66
End: 2021-11-16
Attending: OTOLARYNGOLOGY
Payer: COMMERCIAL

## 2021-11-16 ENCOUNTER — OFFICE VISIT (OUTPATIENT)
Dept: AUDIOLOGY | Facility: CLINIC | Age: 66
End: 2021-11-16
Attending: OTOLARYNGOLOGY
Payer: COMMERCIAL

## 2021-11-16 DIAGNOSIS — H72.91 PERFORATION OF TYMPANIC MEMBRANE, RIGHT: ICD-10-CM

## 2021-11-16 DIAGNOSIS — H90.6 MIXED HEARING LOSS, BILATERAL: Primary | ICD-10-CM

## 2021-11-16 DIAGNOSIS — H90.A12 CONDUCTIVE HEARING LOSS OF LEFT EAR WITH RESTRICTED HEARING OF RIGHT EAR: ICD-10-CM

## 2021-11-16 DIAGNOSIS — H71.92 CHOLESTEATOMA OF LEFT EAR: Primary | ICD-10-CM

## 2021-11-16 PROCEDURE — 92567 TYMPANOMETRY: CPT | Performed by: AUDIOLOGIST

## 2021-11-16 PROCEDURE — 99213 OFFICE O/P EST LOW 20 MIN: CPT | Mod: 25 | Performed by: OTOLARYNGOLOGY

## 2021-11-16 PROCEDURE — 92557 COMPREHENSIVE HEARING TEST: CPT | Performed by: AUDIOLOGIST

## 2021-11-16 PROCEDURE — 92504 EAR MICROSCOPY EXAMINATION: CPT | Mod: GC | Performed by: OTOLARYNGOLOGY

## 2021-11-16 ASSESSMENT — PAIN SCALES - GENERAL: PAINLEVEL: NO PAIN (0)

## 2021-11-16 NOTE — LETTER
2021       RE: Lennox Acuna  [address] [facility] [address]     Dear Colleague,    Thank you for referring your patient, Lennox Acuna, to the [facility] EAR NO[facility]ROAT [facility]IN[facility] [place] [facility]. Please see a copy of my visit note below.       Neurotology Clinic      Name: Lennox Acuna  MRN: [id number]  Age: 66 year old  : 1955  2021      Chief Complaint:   Follow up     History of Present Illness:   Lennox Acuna is a 66 year old male who presents for follow-up. Patient has a history of left-sided cholesteatoma and his most recent surgery was a left revision PORP with removal of small amount of residual cholesteatoma in ; also underwent right PE tube placement by Dr. Hodges, and he was last seen in 2021 at which time bilateral PE tubes were placed. Today, patient reports a subjective worsening of his right sided hearing, but denies otorrhea, ear infections, or vertigo; his left-sided hearing has been stable. He acquired hearing aids in April and finds them very helpful, although they haven't been adjusted since he first got them.     Review of Systems:   Pertinent items are noted in HPI or as in patient entered ROS below, remainder of complete ROS is negative.    ENT ROS 2021   Ears, Nose, Throat Hearing loss   Gastrointestinal/Genitourinary -   Musculoskeletal -      Physical Exam:   There were no vitals taken for this visit.     Constitutional:  The patient was unaccompanied, well-groomed, and in no acute distress.     Skin: Normal:  warm and pink without rash   Neurologic: Alert and oriented x 3.  CN's III-XII within normal limits.  Voice normal.    Psychiatric: The patient's affect was calm, cooperative, and appropriate.     Communication:  Normal; communicates verbally, normal voice quality.   Respiratory: Breathing comfortably without stridor or exertion of accessory muscles.    Head/Face:   No lesions or scars. No sinus tenderness.       Eyes: Pupils were equal and reactive.  Extraocular movement intact.     Ears: Pinnae and tragus non-tender.  EAC's and TM's were clear.        Otologic microscope exam:  Right ear: PE tube in placed with mild crusting around the tube that was removed; no retraction of the TM noted and PE tube is patent. Middle ear appears well aerated.   Left ear: PE tube in placed with mild crusting around the tube that was removed; no retraction of the TM noted and PE tube is patent. Middle ear appears well aerated.     Audiogram:  AUDIOGRAM: He underwent an audiogram today. This demonstrated:      Right: Speech reception threshold is 45 dB with 100% word recognition at 45 dB. Tympanogram B type   Left: Speech reception threshold is 40 dB with 100% word recognition at 80 dB. Tympanogram B type     Audiogram was independently reviewed.     Assessment and Plan:  Lennox Acuna is a 66 year old male who presents for follow-up. Patient has a history of left-sided cholesteatoma and his most recent surgery was a left revision PORP with removal of small amount of residual cholesteatoma in 2015; also underwent right PE tube placement by Dr. Hodges, and he was last seen in 02/2021 at which time bilateral PE tubes were placed. Audiogram today demonstrates no significant change in hearing; examination shows bilateral tubes in good position and fully patent with no appreciable retraction of the TMs. We discussed with patient that he may benefit from adjustment of his hearing aids for subjective right sided hearing loss. Patient otherwise may follow-up in 1 year for repeat check of PE tubes or sooner if any concerns arise. Patient agrees with this plan.    Jair Irwin MD  Otolaryngology - Head and Neck Surgery, PGY-2     Joy Moore MD  Otology & Neurotology  [facility]    Scribe Preparation Attestation:  Destiny PEREZ, ramonita scribe, prepared the chart for today's  encounter.         I, Joy Moore MD, saw this patient with the resident/fellow and agree with the resident s findings and plan of care as documented in the resident s/fellow s note. I was present for the entire procedure.        Again, thank you for allowing me to participate in the care of your patient.      Sincerely,    Joy Moore MD

## 2021-11-16 NOTE — PATIENT INSTRUCTIONS
1. You were seen in the ENT Clinic today by Dr. Moore.  If you have any questions or concerns after your appointment, please call   - Option 1: ENT Clinic: [phone]   - Option 2: Mindy (Dr. Moore' Nurse): [phone]                    Darlyn(Dr. Moore' Nurse): [phone]      2.   Plan to return to clinic in 1 year with hearing test    Mindy Enrique LPN  [facility] - Otolaryngology

## 2021-11-16 NOTE — PROGRESS NOTES
AUDIOLOGY REPORT    SUMMARY: Audiology visit completed. See audiogram for results.      RECOMMENDATIONS: Follow-up with ENT.    Kishore Kamara, [facility]  Licensed Audiologist  [place] #[id number]

## 2021-11-16 NOTE — PROGRESS NOTES
Neurotology Clinic      Name: Lennox Acuna  MRN: [id number]  Age: 66 year old  : 1955  2021      Chief Complaint:   Follow up     History of Present Illness:   Lennox Acuna is a 66 year old male who presents for follow-up. Patient has a history of left-sided cholesteatoma and his most recent surgery was a left revision PORP with removal of small amount of residual cholesteatoma in ; also underwent right PE tube placement by Dr. Hodges, and he was last seen in 2021 at which time bilateral PE tubes were placed. Today, patient reports a subjective worsening of his right sided hearing, but denies otorrhea, ear infections, or vertigo; his left-sided hearing has been stable. He acquired hearing aids in April and finds them very helpful, although they haven't been adjusted since he first got them.     Review of Systems:   Pertinent items are noted in HPI or as in patient entered ROS below, remainder of complete ROS is negative.    ENT ROS 2021   Ears, Nose, Throat Hearing loss   Gastrointestinal/Genitourinary -   Musculoskeletal -      Physical Exam:   There were no vitals taken for this visit.     Constitutional:  The patient was unaccompanied, well-groomed, and in no acute distress.     Skin: Normal:  warm and pink without rash   Neurologic: Alert and oriented x 3.  CN's III-XII within normal limits.  Voice normal.    Psychiatric: The patient's affect was calm, cooperative, and appropriate.     Communication:  Normal; communicates verbally, normal voice quality.   Respiratory: Breathing comfortably without stridor or exertion of accessory muscles.    Head/Face:  No lesions or scars. No sinus tenderness.       Eyes: Pupils were equal and reactive.  Extraocular movement intact.     Ears: Pinnae and tragus non-tender.  EAC's and TM's were clear.        Otologic microscope exam:  Right ear: PE tube in placed with mild crusting around the tube that was removed; no retraction of the TM  noted and PE tube is patent. Middle ear appears well aerated.   Left ear: PE tube in placed with mild crusting around the tube that was removed; no retraction of the TM noted and PE tube is patent. Middle ear appears well aerated.     Audiogram:  AUDIOGRAM: He underwent an audiogram today. This demonstrated:      Right: Speech reception threshold is 45 dB with 100% word recognition at 45 dB. Tympanogram B type   Left: Speech reception threshold is 40 dB with 100% word recognition at 80 dB. Tympanogram B type     Audiogram was independently reviewed.     Assessment and Plan:  Lennox cAuna is a 66 year old male who presents for follow-up. Patient has a history of left-sided cholesteatoma and his most recent surgery was a left revision PORP with removal of small amount of residual cholesteatoma in 2015; also underwent right PE tube placement by Dr. Hodges, and he was last seen in 02/2021 at which time bilateral PE tubes were placed. Audiogram today demonstrates no significant change in hearing; examination shows bilateral tubes in good position and fully patent with no appreciable retraction of the TMs. We discussed with patient that he may benefit from adjustment of his hearing aids for subjective right sided hearing loss. Patient otherwise may follow-up in 1 year for repeat check of PE tubes or sooner if any concerns arise. Patient agrees with this plan.    Jair Irwin MD  Otolaryngology - Head and Neck Surgery, PGY-2     Joy Moore MD  Otology & Neurotology  [facility]    Scribe Preparation Attestation:  Destiny PEREZ, a scribe, prepared the chart for today's encounter.         I, Joy Moore MD, saw this patient with the resident/fellow and agree with the resident s findings and plan of care as documented in the resident s/fellow s note. I was present for the entire procedure.

## 2021-11-30 ENCOUNTER — OFFICE VISIT (OUTPATIENT)
Dept: FAMILY MEDICINE | Facility: CLINIC | Age: 66
End: 2021-11-30

## 2021-11-30 VITALS
WEIGHT: 195 LBS | SYSTOLIC BLOOD PRESSURE: 130 MMHG | BODY MASS INDEX: 30.61 KG/M2 | TEMPERATURE: 98.2 F | OXYGEN SATURATION: 96 % | DIASTOLIC BLOOD PRESSURE: 80 MMHG | HEIGHT: 67 IN | HEART RATE: 83 BPM

## 2021-11-30 DIAGNOSIS — Z01.818 PRE-OPERATIVE EXAMINATION: Primary | ICD-10-CM

## 2021-11-30 DIAGNOSIS — Z87.11 HISTORY OF GASTRIC ULCER: ICD-10-CM

## 2021-11-30 DIAGNOSIS — M19.041 DEGENERATIVE ARTHRITIS OF METACARPOPHALANGEAL JOINT OF INDEX FINGER OF RIGHT HAND: ICD-10-CM

## 2021-11-30 DIAGNOSIS — R73.03 PREDIABETES: ICD-10-CM

## 2021-11-30 LAB
ERYTHROCYTE [DISTWIDTH] IN BLOOD BY AUTOMATED COUNT: 13.3 %
HCT VFR BLD AUTO: 50.5 % (ref 40–53)
HEMOGLOBIN: 16.6 G/DL (ref 13.3–17.7)
MCH RBC QN AUTO: 30.8 PG (ref 26–33)
MCHC RBC AUTO-ENTMCNC: 32.9 G/DL (ref 31–36)
MCV RBC AUTO: 93.7 FL (ref 78–100)
PLATELET COUNT - QUEST: 345 10^9/L (ref 150–375)
RBC # BLD AUTO: 5.39 10*12/L (ref 4.4–5.9)
WBC # BLD AUTO: 9.9 10*9/L (ref 4–11)

## 2021-11-30 PROCEDURE — 36415 COLL VENOUS BLD VENIPUNCTURE: CPT | Performed by: PHYSICIAN ASSISTANT

## 2021-11-30 PROCEDURE — 85027 COMPLETE CBC AUTOMATED: CPT | Performed by: PHYSICIAN ASSISTANT

## 2021-11-30 PROCEDURE — 99214 OFFICE O/P EST MOD 30 MIN: CPT | Mod: 25 | Performed by: PHYSICIAN ASSISTANT

## 2021-11-30 PROCEDURE — 93000 ELECTROCARDIOGRAM COMPLETE: CPT | Performed by: PHYSICIAN ASSISTANT

## 2021-11-30 ASSESSMENT — MIFFLIN-ST. JEOR: SCORE: 1605.2

## 2021-11-30 NOTE — NURSING NOTE
Chief Complaint   Patient presents with     Pre-Op Exam     right index finger fusion on 12/14

## 2021-11-30 NOTE — LETTER
[facility]YS[facility]ANS  [address]  [address]  [facility] [address]  Phone: [phone]  Fax: [phone]  Primary Provider: Maria Luisa Mike  Pre-op Performing Provider: MARIA LUISA MIKE    {Provider  Link to PREOP SmartSet  Use this to apply standard patient instructions to AVS; includes medication directions, common orders, guidelines for anemia, warfarin, additional testing    PREOPERATIVE EVALUATION:  Today's date: 11/30/2021    Lennox Acuna is a 66 year old male who presents for a preoperative evaluation.    Surgical Information:  Surgery/Procedure: Right index finger fusion  Surgery Location: [facility]  Surgeon: Dr. Perkins  Surgery Date: 12/14/2021  Time of Surgery: PM  Where patient plans to recover: At home with family  Fax number for surgical facility: [phone]    Type of Anesthesia Anticipated: General    Assessment & Plan     The proposed surgical procedure is considered INTERMEDIATE risk.    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    25 minutes spent on the date of the encounter doing chart review, review of outside records, review of test results, interpretation of tests, patient visit and documentation         Subjective     HPI related to upcoming  procedure:   Pt saw Dr. Perkins 10/18/2021 and had left MCP injection, but right index finger was recommended to have arthrodesis.     1. No - Have you ever had a heart attack or stroke?  2. No - Have you ever had surgery on your heart or blood vessels, such as a stent, coronary (heart) bypass, or surgery on an artery in the head, neck, heart, or legs?  3. No - Do you have chest pain when you are physically active?  4. No - Do you have a history of heart failure?  5. No - Do you currently have a cold, bronchitis, or symptoms of other respiratory (head and chest) infections?  6. No - Do you have a cough, shortness of breath, or wheezing?  7. No - Do you or anyone in your family have a history of blood clots?  8. No - Do you or anyone in your family have a serious bleeding problem, such as long-lasting bleeding after surgeries or cuts?  9. No - Have you ever had anemia or been told to take iron pills?  10. No - Have you had any abnormal blood loss such as black, tarry or bloody stools, or abnormal vaginal bleeding?  11. No - Have you ever had a blood transfusion?  12. Yes - Are you willing to have a blood transfusion if it is medically needed before, during, or after your surgery?  13. Yes - Have you or anyone in your family ever had problems with anesthesia (sedation for surgery)? Pt has had delayed waking up after anesthesia.   14. No - Do you have sleep apnea, excessive snoring, or daytime drowsiness?   15. No - Do you have any artifical heart valves or other implanted medical devices, such as a pacemaker, defibrillator, or continuous glucose monitor?  16. No - Do you have any artifical joints?  17. No - Are you allergic to latex?  18. No - Is there any chance that you may be pregnant?    Health Care Directive:  Patient does not have a Health Care Directive or Living Will: Discussed advance care planning with patient; however, patient declined at this time.    Preoperative Review of :   reviewed - no record  of controlled substances prescribed.      Status of Chronic Conditions:  See problem list for active medical problems.  Problems all longstanding and stable, except as noted/documented.  See ROS for pertinent symptoms related to these conditions.    Review of Systems  Constitutional, neuro, ENT, endocrine, pulmonary, cardiac, gastrointestinal, genitourinary, musculoskeletal, integument and psychiatric systems are negative, except as otherwise noted.    Patient Active Problem List    Diagnosis Date Noted     History of diverticulitis 12/18/2020     Priority: Medium     Gastroesophageal reflux disease with esophagitis without hemorrhage 12/18/2020     Priority: Medium     Health Care Home (V65.8) 07/16/2014     Priority: Medium     ACP (advance care planning) 07/16/2014     Priority: Medium     Advance Care Planning 8/24/2015: ACP Review and Resources Provided:  Reviewed chart for advance care plan.  Lennox GAONA Armand has no plan or code status on file. Discussed available resources and provided with information. Confirmed code status reflects current choices pending further ACP discussions.  Confirmed/documented legally designated decision maker(s). Added by Erica Ta               Internal derangement of knee 07/16/2014     Priority: Medium     History of gastric ulcer 04/15/2013     Priority: Medium     Conductive hearing loss in left ear 10/07/2011     Priority: Medium     Cholesteatoma 10/07/2011     Priority: Medium      Past Medical History:   Diagnosis Date     HL (hearing loss)      Past Surgical History:   Procedure Laterality Date     COLONOSCOPY  2010    every 5 years/ DR Daugherty     HAND SURGERY  2/2012    thumb     HERNIA REPAIR  1990's    left     LASER DIODE STAPEDOTOMY Left 1/12/2015    Procedure: LASER DIODE STAPEDOTOMY;  Surgeon: Layton Garcia MD;  Location: [facility] OR     STRESS TEST  age 48    WNL     TONSILLECTOMY  age 8     TYMPANOMASTOIDECTOMY  2010/2011    facial nerve tumors      "TYMPANOMASTOIDECTOMY WITH FACIAL MONITORING  2011    Procedure:TYMPANOMASTOIDECTOMY WITH FACIAL MONITORING; Left Revision Tympanomastoidectomy with Partial  Ossicular Chain Reconstruction, using Facial Nerve Monitoring **latex safe room; Surgeon:TASHA CARRILLO; Location:[facility] OR     No current outpatient medications on file.       Allergies   Allergen Reactions     Nkda [No Known Drug Allergies]         Social History     Tobacco Use     Smoking status: Never Smoker     Smokeless tobacco: Never Used   Substance Use Topics     Alcohol use: Yes     Comment: 2 drinks/month     Family History   Problem Relation Age of Onset     Breast Cancer Mother 60         67     Hypertension Mother      Chronic Obstructive Pulmonary Disease Father 81     Hypertension Father      Hypertension Sister      Crohn's Disease Sister      Cancer Sister      Hypertension Brother      Diabetes No family hx of      Prostate Cancer No family hx of        History   Drug Use No         Objective     /80 (BP Location: Left arm, Patient Position: Sitting, Cuff Size: Adult Large)   Pulse 83   Temp 98.2  F (36.8  C) (Temporal)   Ht 1.689 m (5' 6.5\")   Wt 88.5 kg (195 lb)   SpO2 96%   BMI 31.00 kg/m      Physical Exam    GENERAL APPEARANCE: healthy, alert and no distress     EYES: EOMI,  PERRL     HENT: ear canals and TM's normal and nose and mouth without ulcers or lesions     NECK: no adenopathy, no asymmetry, masses, or scars and thyroid normal to palpation     RESP: lungs clear to auscultation - no rales, rhonchi or wheezes     CV: regular rates and rhythm, normal S1 S2, no S3 or S4 and no murmur, click or rub     ABDOMEN:  soft, nontender, no HSM or masses and bowel sounds normal     MS: extremities normal- no gross deformities noted, no evidence of inflammation in joints, FROM in all extremities.     SKIN: no suspicious lesions or rashes     NEURO: Normal strength and tone, sensory exam grossly normal, mentation intact and " speech normal     PSYCH: mentation appears normal. and affect normal/bright     LYMPHATICS: No cervical adenopathy     Diagnostics:  Recent Results (from the past 168 hour(s))   Hemogram Platelet (BFP)    Collection Time: 11/30/21  1:48 PM   Result Value Ref Range    WBC 9.9 4.0 - 11 10*9/L    RBC Count 5.39 4.4 - 5.9 10*12/L    Hemoglobin 16.6 13.3 - 17.7 g/dL    Hematocrit 50.5 40.0 - 53.0 %    MCV 93.7 78 - 100 fL    MCH 30.8 26 - 33 pg    MCHC 32.9 31 - 36 g/dL    RDW 13.3 %    Platelet Count 345 150 - 375 10^9/L      EKG: appears normal, NSR, normal axis, normal intervals, no acute ST/T changes c/w ischemia, no LVH by voltage criteria, unchanged from previous tracings    Revised Cardiac Risk Index (RCRI):  The patient has the following serious cardiovascular risks for perioperative complications:   - No serious cardiac risks = 0 points     RCRI Interpretation: 1 point: Class II (low risk - 0.9% complication rate)       Signed Electronically by: Maria Luisa Aguila PA-C  Copy of this evaluation report is provided to requesting physician.

## 2021-11-30 NOTE — PROGRESS NOTES
[facility] PHYSICIANS  [address]  [address]  [facility] [address]  Phone: [phone]  Fax: [phone]  Primary Provider: Maria Luisa Mike  Pre-op Performing Provider: MARIA LUISA MIKE    {Provider  Link to PREOP SmartSet  Use this to apply standard patient instructions to AVS; includes medication directions, common orders, guidelines for anemia, warfarin, additional testing    PREOPERATIVE EVALUATION:  Today's date: 11/30/2021    Lennox Acuna is a 66 year old male who presents for a preoperative evaluation.    Surgical Information:  Surgery/Procedure: Right index finger fusion  Surgery Location: [facility]  Surgeon: Dr. Perkins  Surgery Date: 12/14/2021  Time of Surgery: PM  Where patient plans to recover: At home with family  Fax number for surgical facility: [phone]    Type of Anesthesia Anticipated: General    Assessment & Plan     The proposed surgical procedure is considered INTERMEDIATE risk.    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    25 minutes spent on the date of the encounter doing chart review, review of outside records, review of test results, interpretation of tests, patient visit and documentation         Subjective     HPI related to upcoming  procedure:   Pt saw Dr. Perkins 10/18/2021 and had left MCP injection, but right index finger was recommended to have arthrodesis.     1. No - Have you ever had a heart attack or stroke?  2. No - Have you ever had surgery on your heart or blood vessels, such as a stent, coronary (heart) bypass, or surgery on an artery in the head, neck, heart, or legs?  3. No - Do you have chest pain when you are physically active?  4. No - Do you have a history of heart failure?  5. No - Do you currently have a cold, bronchitis, or symptoms of other respiratory (head and chest) infections?  6. No - Do you have a cough, shortness of breath, or wheezing?  7. No - Do you or anyone in your family have a history of blood clots?  8. No - Do you or anyone in your family have a serious bleeding problem, such as long-lasting bleeding after surgeries or cuts?  9. No - Have you ever had anemia or been told to take iron pills?  10. No - Have you had any abnormal blood loss such as black, tarry or bloody stools, or abnormal vaginal bleeding?  11. No - Have you ever had a blood transfusion?  12. Yes - Are you willing to have a blood transfusion if it is medically needed before, during, or after your surgery?  13. Yes - Have you or anyone in your family ever had problems with anesthesia (sedation for surgery)? Pt has had delayed waking up after anesthesia.   14. No - Do you have sleep apnea, excessive snoring, or daytime drowsiness?   15. No - Do you have any artifical heart valves or other implanted medical devices, such as a pacemaker, defibrillator, or continuous glucose monitor?  16. No - Do you have any artifical joints?  17. No - Are you allergic to latex?  18. No - Is there any chance that you may be pregnant?    Health Care Directive:  Patient does not have a Health Care Directive or Living Will: Discussed advance care planning with patient; however, patient declined at this time.    Preoperative Review of :   reviewed - no record  of controlled substances prescribed.      Status of Chronic Conditions:  See problem list for active medical problems.  Problems all longstanding and stable, except as noted/documented.  See ROS for pertinent symptoms related to these conditions.    Review of Systems  Constitutional, neuro, ENT, endocrine, pulmonary, cardiac, gastrointestinal, genitourinary, musculoskeletal, integument and psychiatric systems are negative, except as otherwise noted.    Patient Active Problem List    Diagnosis Date Noted     History of diverticulitis 12/18/2020     Priority: Medium     Gastroesophageal reflux disease with esophagitis without hemorrhage 12/18/2020     Priority: Medium     Health Care Home (V65.8) 07/16/2014     Priority: Medium     ACP (advance care planning) 07/16/2014     Priority: Medium     Advance Care Planning 8/24/2015: ACP Review and Resources Provided:  Reviewed chart for advance care plan.  Lennox GAONA Armand has no plan or code status on file. Discussed available resources and provided with information. Confirmed code status reflects current choices pending further ACP discussions.  Confirmed/documented legally designated decision maker(s). Added by Erica Ta               Internal derangement of knee 07/16/2014     Priority: Medium     History of gastric ulcer 04/15/2013     Priority: Medium     Conductive hearing loss in left ear 10/07/2011     Priority: Medium     Cholesteatoma 10/07/2011     Priority: Medium      Past Medical History:   Diagnosis Date     HL (hearing loss)      Past Surgical History:   Procedure Laterality Date     COLONOSCOPY  2010    every 5 years/ DR Daugherty     HAND SURGERY  2/2012    thumb     HERNIA REPAIR  1990's    left     LASER DIODE STAPEDOTOMY Left 1/12/2015    Procedure: LASER DIODE STAPEDOTOMY;  Surgeon: Layton Garcia MD;  Location: [facility] OR     STRESS TEST  age 48    WNL     TONSILLECTOMY  age 8     TYMPANOMASTOIDECTOMY  2010/2011    facial nerve tumors      "TYMPANOMASTOIDECTOMY WITH FACIAL MONITORING  2011    Procedure:TYMPANOMASTOIDECTOMY WITH FACIAL MONITORING; Left Revision Tympanomastoidectomy with Partial  Ossicular Chain Reconstruction, using Facial Nerve Monitoring **latex safe room; Surgeon:TASHA CARRILLO; Location:[facility] OR     No current outpatient medications on file.       Allergies   Allergen Reactions     Nkda [No Known Drug Allergies]         Social History     Tobacco Use     Smoking status: Never Smoker     Smokeless tobacco: Never Used   Substance Use Topics     Alcohol use: Yes     Comment: 2 drinks/month     Family History   Problem Relation Age of Onset     Breast Cancer Mother 60         67     Hypertension Mother      Chronic Obstructive Pulmonary Disease Father 81     Hypertension Father      Hypertension Sister      Crohn's Disease Sister      Cancer Sister      Hypertension Brother      Diabetes No family hx of      Prostate Cancer No family hx of        History   Drug Use No         Objective     /80 (BP Location: Left arm, Patient Position: Sitting, Cuff Size: Adult Large)   Pulse 83   Temp 98.2  F (36.8  C) (Temporal)   Ht 1.689 m (5' 6.5\")   Wt 88.5 kg (195 lb)   SpO2 96%   BMI 31.00 kg/m      Physical Exam    GENERAL APPEARANCE: healthy, alert and no distress     EYES: EOMI,  PERRL     HENT: ear canals and TM's normal and nose and mouth without ulcers or lesions     NECK: no adenopathy, no asymmetry, masses, or scars and thyroid normal to palpation     RESP: lungs clear to auscultation - no rales, rhonchi or wheezes     CV: regular rates and rhythm, normal S1 S2, no S3 or S4 and no murmur, click or rub     ABDOMEN:  soft, nontender, no HSM or masses and bowel sounds normal     MS: extremities normal- no gross deformities noted, no evidence of inflammation in joints, FROM in all extremities.     SKIN: no suspicious lesions or rashes     NEURO: Normal strength and tone, sensory exam grossly normal, mentation intact and " speech normal     PSYCH: mentation appears normal. and affect normal/bright     LYMPHATICS: No cervical adenopathy     Diagnostics:  Recent Results (from the past 168 hour(s))   Hemogram Platelet (BFP)    Collection Time: 11/30/21  1:48 PM   Result Value Ref Range    WBC 9.9 4.0 - 11 10*9/L    RBC Count 5.39 4.4 - 5.9 10*12/L    Hemoglobin 16.6 13.3 - 17.7 g/dL    Hematocrit 50.5 40.0 - 53.0 %    MCV 93.7 78 - 100 fL    MCH 30.8 26 - 33 pg    MCHC 32.9 31 - 36 g/dL    RDW 13.3 %    Platelet Count 345 150 - 375 10^9/L      EKG: appears normal, NSR, normal axis, normal intervals, no acute ST/T changes c/w ischemia, no LVH by voltage criteria, unchanged from previous tracings    Revised Cardiac Risk Index (RCRI):  The patient has the following serious cardiovascular risks for perioperative complications:   - No serious cardiac risks = 0 points     RCRI Interpretation: 1 point: Class II (low risk - 0.9% complication rate)       Signed Electronically by: Maria Luisa Aguila PA-C  Copy of this evaluation report is provided to requesting physician.

## 2022-10-22 ENCOUNTER — HEALTH MAINTENANCE LETTER (OUTPATIENT)
Age: 67
End: 2022-10-22

## 2022-10-31 ENCOUNTER — TRANSFERRED RECORDS (OUTPATIENT)
Dept: FAMILY MEDICINE | Facility: CLINIC | Age: 67
End: 2022-10-31

## 2022-12-10 ENCOUNTER — HEALTH MAINTENANCE LETTER (OUTPATIENT)
Age: 67
End: 2022-12-10

## 2023-02-23 NOTE — PROGRESS NOTES
Emergency Department Attending Note    Patient: Kandy Hagan Age: 30 year old Sex: female   MRN: [id number] : 1992 Encounter Date: 2023       HISTORY OF PRESENT ILLNESS  This is a 30 year old female with past medical history of bipolar disease, anxiety, reactive airways disease, now presenting for further evaluation for mental health evaluation.    Patient is a poor historian.  Per EMS report, patient not taking her medications and had suicidal ideation yesterday.  Reportedly family was concerned and called EMS and patient was brought to the emergency department.  Patient having bizarre thoughts and stating something about needing to have her on a charles and solving an issue by giving her the Charles back.    Patient currently denies SI or HI or hallucinations.    States she would like to speak with the crisis worker.           REVIEW OF SYSTEMS:   Review of Systems   Constitutional:   No fever, pno weakness   Skin:       No rash   ENT:        No sore throat, no congestion   Respiratory:    No cough, no shortness of breath    Cardiovascular: No chest pain, no edema   Gastrointestinal:No abdominal pain, no nausea, no vomiting   Genitourinary:  No dysuria, no hematuria   Musculoskeletal:No back pain, no joint pain   Neurologic:    No headache     Psych: + as above       [] Unable to obtain due to: [] Clinical Condition [] Baseline Cognitive Ability / Medical Condition           PAST HISTORY         Past Medical History:   Diagnosis Date   • Anxiety    • Bipolar disorder, unspecified (CMS/[facility])    • RAD (reactive airway disease)    • Sinusitis, chronic     No past surgical history on file.     Additional PMH (also as noted in hpi & pmh section):  [] Denies PMH  [] As Above Additional PSH (also as noted in hpi & PSH section) :  [] Denies PSH  [] As above          Social History     Tobacco Use   • Smoking status: Every Day     Packs/day: 1.00     Years: 5.00     Pack years: 5.00     Types: Cigarettes      "November 20, 2020    I had the pleasure of seeing Lennox Acuna today in follow up in the Neurotology Clinic.    He is prior patient of Dr. Garcia and Dr. Hodges, last seeing them in 2015.  I reviewed his surgical history.  His last surgery with Dr. Garcia in 2015 included revision PORP and removal of a small amount of residual cholesteatoma.  A tube has been in place in that ear since.  The right ear developed an effusion and Dr. Hodges placed a PE tube, removed it when the patient felt that it negatively impacted his hearing, and then placed a paper patch.    He came in 3 weeks ago stating that the hearing in his left ear had worsened over the past year.  For a while he found that he was able to manipulate his auricle and his hearing would improve.   His right ear hearing seems stable, however he has been unable to pop the ear as he had been able to do in the past.  He had a left cerumen impaction.  His left PE tube was occluded.  He has used drops.  The ear still feels full but better than before.     Patient Supplied Answers to Review of Systems   ENT ROS 11/20/2020   Gastrointestinal/Genitourinary Heartburn/indigestion   10 point ROS otherwise negative    On physical exam:  BP (!) 177/94   Pulse 90   Temp 98.1  F (36.7  C)   Resp 17   Ht 1.702 m (5' 7\")   Wt 90.7 kg (200 lb)   SpO2 98%   BMI 31.32 kg/m     Gen: no acute distress, unaccompanied  Resp: no stridor, stertor, cough  Psych: normal affect  Neuro: normal facial nerve function  Ears: normal pinna, postauricular scar    Ear microscope exam:  Right tympanic membrane remains atelectatic in his posterior aspect and is retracted down onto what appears to be a necrotic incus and stapes superstructure.  The chorda tympani nerve is draped with the tympanic membrane.  There is a small perforation of the tympanic membrane just posterior to the stapes capitulum, stable.  There is no middle ear effusion.    On the left side, the canal has minimal debris " Start date: 1/21/2010   • Smokeless tobacco: Never   • Tobacco comments:     Patient stated that she is not ready to quit smoking.   Vaping Use   • Vaping Use: never used   • Substances: Nicotine, Flavoring   • Devices: Disposable, Pre-filled or refillable cartridge, Refillable tank, Pre-filled pod   Substance Use Topics   • Alcohol use: Not Currently     Alcohol/week: 2.0 standard drinks     Types: 1 Glasses of wine, 1 Cans of beer per week   • Drug use: Never    Family History   Problem Relation Age of Onset   • Patient is unaware of any medical problems Mother    • Patient is unaware of any medical problems Father    • Patient is unaware of any medical problems Sister    • Patient is unaware of any medical problems Brother    • Patient is unaware of any medical problems Daughter    • Patient is unaware of any medical problems Son    • Patient is unaware of any medical problems Maternal Aunt    • Patient is unaware of any medical problems Maternal Uncle    • Patient is unaware of any medical problems Paternal Aunt    • Patient is unaware of any medical problems Paternal Uncle    • Diabetes Maternal Grandmother    • Hypertension Maternal Grandmother    • Diabetes Maternal Grandfather    • Hypertension Maternal Grandfather    • Diabetes Paternal Grandmother    • Hypertension Paternal Grandmother    • Diabetes Paternal Grandfather    • Hypertension Paternal Grandfather        Additional SH:  [x] Denies etoh  [x] Denies tob  [x] Denies illicit substances  [] Lives with family  [] Lives in nursing home / care facility  [] Lives in assisted living / group home Additional FH:          Prior to Admission medications    Medication Sig Start Date End Date Taking? Authorizing Provider   OXcarbazepine (TRILEPTAL) 300 MG tablet Take 1 tablet by mouth every 12 hours. 3/30/22 5/29/22  Yary Jose CNP   OLANZapine (ZyPREXA) 20 MG tablet Take 1 tablet by mouth nightly. 3/30/22 5/29/22  Yary Jose CNP   vitamin D3  and no infection. There is a pressure equalization tube in the anterior superior aspect that has dry scar filling it. Granuloma has resolved but tube is plugged.    Audiogram  Normal sloping to moderately-severe mixed hearing loss left ear. Mild to profound mixed hearing loss right ear. 25 dB improvement in threshold at 250 Hz and 15-25 dB decrease at 4-6 kHz right ear and 20 dB improvement at 1 kHz left ear re: audio 9/11/15. Flat tymps bilaterally. Did not test reflexes due to abnormal tymps.    Impression and Plan:  1. Left cerumen impaction, resolved  2. Postobstructive acute otitis externa, resolvd  3. Occluded PE tube - removed in clinic today  4. Prior left cholesteatoma, PORP in place, stable  5. Right chronic otitis media with retraction and perforation, stable    After discussing the pros and cons in great detail, the patient elected to have the left tube removed.  I grasped it with an alligator and removed it. The middle ear space was aerated.  He will use drops for 3 days and will keep water out of the ear.  He understands removal of the tube is not to improve his hearing but to prevent ongoing TM retraction since it was totally plugged.  The hole in the right ear is acting like a microtube and he will not be able to pop the ear accordingly and at least it is potentially preventing further progression as well.    RTC 3 months with audiogram.       Joy Moore MD  Otology & Neurotology  [facility]       (CHOLECALCIFEROL) 1.25 mg (50,000 units) capsule Take 1 capsule by mouth 1 day a week. 4/4/22   Yary Jose CNP   nicotine polacrilex (NICORETTE) 2 MG gum Take 1 each by mouth every hour as needed for Smoking cessation. 3/30/22   Yary Jose CNP     No Known Allergies        PHYSICAL EXAMINATION  ED Triage Vitals [02/22/23 1909]   /78   Heart Rate (!) 115   Resp 18   Temp 98.4 °F (36.9 °C)   SpO2 98 %       Physical Exam    General:  Patient is generally well-appearing, well hydrated, in no acute distress  Head:  Atraumatic  Eye: PERRL, EOMI, no proptosis, nl conjunctiva, nonicteric  HEENT:  Moist mucous membranes, oropharynx non-erythematous  Neck:  Supple  Cardiovasc: Regular rate and rhythm, no murmurs rubs or gallops, equal peripheral pulses  Resp:  No increased work of breathing, lungs are clear to auscultation bilaterally  GI:   Abdomen is soft, nontender, nondistended, + BS  Back:  Normal appearance and palpation, no cva tenderness  Musculoskeletal: No cyanosis, clubbing, edema. Good peripheral perfusion  Skin:   No obvious lesions  Neuro:  No focal neurologic abnormalities  Psych:  Flattened affect, thought blocking, not obviously responding to abnormal internal or external stimuli, somewhat bizarre and at times disorganized thought               MDM / IMPRESSION / PLAN  This is a 30 year old female with signs and symptoms suggestive of suspected acute psychosis, with underlying bipolar disorder, with contributing factor of patient noncompliant with medications.    Patient appears disheveled and not apparently able to care for self.  Family with concern and patient yesterday with positive suicidal ideation.    We will proceed with plan for psychiatric screening labs, crisis consult and anticipated need for behavioral health admission.         Labs:   Results for orders placed or performed during the hospital encounter of 02/22/23   Comprehensive Metabolic Panel   Result Value Ref Range     Fasting Status      Sodium 136 135 - 145 mmol/L    Potassium 2.9 (L) 3.4 - 5.1 mmol/L    Chloride 104 97 - 110 mmol/L    Carbon Dioxide 26 21 - 32 mmol/L    Anion Gap 9 7 - 19 mmol/L    Glucose 126 (H) 70 - 99 mg/dL    BUN 10 6 - 20 mg/dL    Creatinine 0.68 0.51 - 0.95 mg/dL    Glomerular Filtration Rate >90 >=60    BUN/ Creatinine Ratio 15 7 - 25    Calcium 8.7 8.4 - 10.2 mg/dL    Bilirubin, Total 0.4 0.2 - 1.0 mg/dL    GOT/AST 35 <=37 Units/L    GPT/ALT 22 <64 Units/L    Alkaline Phosphatase 59 45 - 117 Units/L    Albumin 3.1 (L) 3.6 - 5.1 g/dL    Protein, Total 6.5 6.4 - 8.2 g/dL    Globulin 3.4 2.0 - 4.0 g/dL    A/G Ratio 0.9 (L) 1.0 - 2.4   Alcohol   Result Value Ref Range    Alcohol None Detected None Detected mg/dL   Salicylate Level   Result Value Ref Range    Salicylate <2.8 <=30.0 mg/dL   Acetaminophen Level   Result Value Ref Range    Acetaminophen <2 (L) 10 - 30 mcg/mL   Pregnancy Test, Serum Qualitative   Result Value Ref Range    HCG, Qualitative Negative Negative   CBC with Automated Differential (performable only)   Result Value Ref Range    WBC 8.3 4.2 - 11.0 K/mcL    RBC 4.34 4.00 - 5.20 mil/mcL    HGB 13.2 12.0 - 15.5 g/dL    HCT 39.0 36.0 - 46.5 %    MCV 89.9 78.0 - 100.0 fl    MCH 30.4 26.0 - 34.0 pg    MCHC 33.8 32.0 - 36.5 g/dL    RDW-CV 13.2 11.0 - 15.0 %    RDW-SD 43.4 39.0 - 50.0 fL     140 - 450 K/mcL    NRBC 0 <=0 /100 WBC    Neutrophil, Percent 55 %    Lymphocytes, Percent 34 %    Mono, Percent 9 %    Eosinophils, Percent 1 %    Basophils, Percent 1 %    Immature Granulocytes 0 %    Absolute Neutrophils 4.5 1.8 - 7.7 K/mcL    Absolute Lymphocytes 2.8 1.0 - 4.8 K/mcL    Absolute Monocytes 0.8 0.3 - 0.9 K/mcL    Absolute Eosinophils  0.1 0.0 - 0.5 K/mcL    Absolute Basophils 0.0 0.0 - 0.3 K/mcL    Absolute Immature Granulocytes 0.0 0.0 - 0.2 K/mcL   COVID/Flu/RSV panel   Result Value Ref Range    Rapid SARS-COV-2 by PCR Not Detected Not Detected / Detected / Presumptive Positive /  Inhibitors present    Influenza A by PCR Not Detected Not Detected    Influenza B by PCR Not Detected Not Detected    RSV BY PCR Not Detected Not Detected    Isolation Guidelines      Procedural Comment       Imaging:   No orders to display          [x] I personally reviewed the imaging study noted, my pertinent gross findings noted, pending final radiologist complete interpretation:         EKG: [x] I personally reviewed this EKG with pertinent gross findings noted, pending cardiologist review:   Rhythm: [x] NSR  [] Paced  [] Afib [] PVCs [] PACs [] Other  Morphology: [x] No major abnormalities [] Nonspecific ST / TW changes [] No clear ischemic changes  Other:      [x] Reviewed prior records with findings of:    ED Course as of 03/03/23 0047   Thu Feb 23, 2023   1046 Patient being transferred to psych facility. Not cooperative; giving 2.5 IM droperidol [RK]      ED Course User Index  [RK] Ladi Bone MD     Vitals:    02/22/23 1909   BP: 115/78   Pulse: (!) 115   Resp: 18   Temp: 98.4 °F (36.9 °C)   TempSrc: Oral   SpO2: 98%     ED Medication Orders (From admission, onward)    None        MDM  Procedures      Pt was reassessed and reevaluated.  Labs are unremarkable.  EKG is nonischemic.  Potassium is 2.9, given oral replacement.  Quad covid screen is negative.  Salicylate levels are negative.    pt w/o significant medical contribution to illness nor contraindication to immediate psychiatric admission for mental health stabilization at this time    Patient seen by crisis intervention team.  Agrees with assessment and plan, will proceed with plan for behavioral health admission.         DIAGNOSIS  ED Diagnosis   1. Schizophrenia, unspecified type (CMS/[facility])              Teaching-Supervisory Addendum-Brief    I participated in the following activities of this patients care: medical history, physical exam, medical decision making, any procedure(s).    I personally performed: supervision of the patient's care,  the medical history, the physical exam, the medical decision making.    The case was discussed with: the resident.    Procedures: I directly supervised the procedure(s) noted.       Note to Patient: The 21st Century Cures Act makes medical notes like these available to patients in the interest of transparency. However, be advised this is a medical document. It is intended as peer to peer communication. It is written in medical language and may contain abbreviations or verbiage that are unfamiliar. It may appear blunt or direct. Medical documents are intended to carry relevant information, facts as evident, and the clinical opinion of the practitioner.  This note may have been transcribed using a voice dictation system. Voice recognition errors may occur. This should not be taken to alter the content or meaning of this note     Nancy Chavez MD  03/03/23 0049

## 2023-07-05 ENCOUNTER — NURSE TRIAGE (OUTPATIENT)
Dept: NURSING | Facility: CLINIC | Age: 68
End: 2023-07-05
Payer: COMMERCIAL

## 2023-07-05 NOTE — TELEPHONE ENCOUNTER
"Nurse Triage SBAR    Is this a 2nd Level Triage? YES, LICENSED PRACTITIONER REVIEW IS REQUIRED    Situation:  Saturday noticed decreased hearing, then pushed on implant and could hear better and then nothing.  Gold drainage from left ear.    Background: Left Diode Laser Revision Stapedotomy Replacement of PORP  Layton Garcia MD on 01-      Assessment:   Patient states that he had a procedure with Dr. Garcia in the past.    He noticed on Saturday that he had some decreased hearing in the left ear.  He then pressed on the implant and could hear and then nothing.  He has some gold drainage on the tissue that he stuffed into ear, he had no cotton.     He does have a hearing aide, that he is not able to hear anything with.  He usus ally hears the little noise of starting them up after recharging, but not able to hear that either.  If his right ear is covered, he can't hear anything.  He denies fevers and chills.  He denies sinus issues.  He did have pain on Saturday, but he believes that with the pushing on the implant, it probably irritated it.  He is taking Tylenol and Ibuprofen daily.  He states that this has happened before and they had to put a longer implant in.    Protocol Recommended Disposition:   RN route to ENT clinic     Recommendation:    stay by phone    Routed to provider      Reason for Disposition    Patient wants to be seen    Additional Information    Negative: Patient sounds very sick or weak to the triager    Negative: Ringing in the ears (tinnitus) and taking aspirin and dosage sounds high (i.e., > 1500 mg/day)    Negative: Earache    Negative: Decreased hearing followed sudden, extremely loud noise (e.g., explosion, not just loud concert)    Answer Assessment - Initial Assessment Questions  1. DESCRIPTION: \"What type of hearing problem are you having? Describe it for me.\" (e.g., complete hearing loss, partial loss)      On Saturday, decreased hearing and then pushed on his ear and could " "hear.  Now he is not able to hear even with pushing on his implant.  2. LOCATION: \"One or both ears?\" If one, ask: \"Which ear?\"      Left ear  3. SEVERITY: \"Can you hear anything?\" If Yes, ask: \"What can you hear?\" (e.g., ticking watch, whisper, talking)    - MILD:  Difficulty hearing soft speech, quiet library sounds, or speech from a distance or over background noise.    - MODERATE: Difficulty hearing normal speech even at closed distances.    - SEVERE: Unable to hear most normal conversation and talking; only able to hear loud sounds such as an alarm clock.      Severe, he also has hearing aides and is not able to hear with them in or the start up noise from the rechargable hearing aides. Not hearing that sound even.  4. ONSET: \"When did this begin?\" \"Did it start suddenly or come on gradually?\"      Saturday  5. PATTERN: \"Does this come and go, or has it been constant since it started?\"      constant  6. PAIN: \"Is there any pain in your ear(s)?\"  (Scale 1-10; or mild, moderate, severe)    - NONE (0): no pain    - MILD (1-3): doesn't interfere with normal activities     - MODERATE (4-7): interferes with normal activities or awakens from sleep     - SEVERE (8-10): excruciating pain, unable to do any normal activities       Only had a bit of pain on Saturday with pushing/irritation to implant.  7. CAUSE: \"What do you think is causing this hearing problem?\"      Dislocated ?  8. OTHER SYMPTOMS: \"Do you have any other symptoms?\" (e.g., dizziness, ringing in ears)      He has gold drainage to that ear.  9. PREGNANCY: \"Is there any chance you are pregnant?\" \"When was your last menstrual period?\"      n/a    Protocols used: HEARING LOSS OR CHANGE-A-OH      "

## 2023-07-10 DIAGNOSIS — H71.92 CHOLESTEATOMA OF LEFT EAR: Primary | ICD-10-CM

## 2023-07-11 ENCOUNTER — OFFICE VISIT (OUTPATIENT)
Dept: AUDIOLOGY | Facility: CLINIC | Age: 68
End: 2023-07-11
Payer: COMMERCIAL

## 2023-07-11 DIAGNOSIS — H90.6 MIXED HEARING LOSS, BILATERAL: Primary | ICD-10-CM

## 2023-07-11 PROCEDURE — 92567 TYMPANOMETRY: CPT | Performed by: AUDIOLOGIST

## 2023-07-11 PROCEDURE — 92557 COMPREHENSIVE HEARING TEST: CPT | Performed by: AUDIOLOGIST

## 2023-07-11 NOTE — PROGRESS NOTES
AUDIOLOGY REPORT    SUMMARY: Audiology visit completed. See audiogram for results.      RECOMMENDATIONS: Follow-up with ENT.      Kishore Leal, CCC-A  Licensed Audiologist  [place] #[id number]

## 2023-07-12 ENCOUNTER — TELEPHONE (OUTPATIENT)
Dept: OTOLARYNGOLOGY | Facility: CLINIC | Age: 68
End: 2023-07-12
Payer: COMMERCIAL

## 2023-07-12 NOTE — TELEPHONE ENCOUNTER
FUTURE VISIT INFORMATION      FUTURE VISIT INFORMATION:    Date: 7/14/23    Time: 10:30AM    Location: [facility] [place]  REFERRAL INFORMATION:    Referring provider:      Referring providers clinic:      Reason for visit/diagnosis      RECORDS REQUESTED FROM:       Clinic name Comments Records Status Imaging Status   [facility] [place]  7/11/23 Audiogram   11/16/21 ENT note from Dr Moore    9/11/2015 note from Dr Garcia  9/11/2015 audiogram from Parvin Guzman     1/12/2015 Left Diode Laser Revision Stapedotomy Replacement of PORP     12/19/2011 Left Revision Tympanomastoidectomy with Partial  Ossicular Chain Reconstruction, using Facial Nerve Monitoring Epic     [facility]  10/8/14 CT Temporal Bone Epic PACS

## 2023-07-14 ENCOUNTER — PRE VISIT (OUTPATIENT)
Dept: OTOLARYNGOLOGY | Facility: CLINIC | Age: 68
End: 2023-07-14

## 2023-07-14 ENCOUNTER — TELEPHONE (OUTPATIENT)
Dept: OTOLARYNGOLOGY | Facility: CLINIC | Age: 68
End: 2023-07-14

## 2023-07-14 ENCOUNTER — OFFICE VISIT (OUTPATIENT)
Dept: OTOLARYNGOLOGY | Facility: CLINIC | Age: 68
End: 2023-07-14
Payer: COMMERCIAL

## 2023-07-14 VITALS
OXYGEN SATURATION: 94 % | WEIGHT: 198 LBS | HEART RATE: 75 BPM | HEIGHT: 67 IN | BODY MASS INDEX: 31.08 KG/M2 | SYSTOLIC BLOOD PRESSURE: 158 MMHG | DIASTOLIC BLOOD PRESSURE: 90 MMHG

## 2023-07-14 DIAGNOSIS — H65.92 OME (OTITIS MEDIA WITH EFFUSION), LEFT: ICD-10-CM

## 2023-07-14 DIAGNOSIS — H60.92 OTITIS EXTERNA OF LEFT EAR, UNSPECIFIED CHRONICITY, UNSPECIFIED TYPE: ICD-10-CM

## 2023-07-14 DIAGNOSIS — H90.A12 CONDUCTIVE HEARING LOSS OF LEFT EAR WITH RESTRICTED HEARING OF RIGHT EAR: Primary | ICD-10-CM

## 2023-07-14 PROCEDURE — 92504 EAR MICROSCOPY EXAMINATION: CPT | Performed by: PHYSICIAN ASSISTANT

## 2023-07-14 PROCEDURE — 99213 OFFICE O/P EST LOW 20 MIN: CPT | Mod: 25 | Performed by: PHYSICIAN ASSISTANT

## 2023-07-14 RX ORDER — CIPROFLOXACIN AND DEXAMETHASONE 3; 1 MG/ML; MG/ML
4 SUSPENSION/ DROPS AURICULAR (OTIC) 2 TIMES DAILY
Qty: 7.5 ML | Refills: 1 | Status: SHIPPED | OUTPATIENT
Start: 2023-07-14 | End: 2023-07-20 | Stop reason: ALTCHOICE

## 2023-07-14 ASSESSMENT — PAIN SCALES - GENERAL: PAINLEVEL: NO PAIN (0)

## 2023-07-14 NOTE — NURSING NOTE
"Chief Complaint   Patient presents with     Consult     Ear check    Blood pressure (!) 158/90, pulse 75, height 1.702 m (5' 7\"), weight 89.8 kg (198 lb), SpO2 94 %.      .  Robb Wiggins LPN    "

## 2023-07-14 NOTE — LETTER
7/14/2023       RE: Lennox Acuna  [address] [facility] [address]     Dear Colleague,    Thank you for referring your patient, Lennox Acuna, to the [facility] EAR NOSE AND THROAT CLINIC [place] at [facility]. Please see a copy of my visit note below.      Otolaryngology Clinic  July 14, 2023    Chief Complaint:   Hearing loss    History of Present Illness:   Lennox Acuna is a 68 year old seen in Otology/Neurotology Clinic today for evaluation of increased hearing loss in his left ear. Patient reports noticing a decrease in the hearing in his left ear on 7/1/2023. His ear has been draining since then. He denies any pain or dizziness. He explains that he believes his prosthesis on the left has moved creating this hearing loss. He is a prior patient of Dr. Garcia and Dr. Hodges and was last seen in 2021 for bilateral PE tube placement.       PAST MEDICAL HISTORY:   Past Medical History:   Diagnosis Date    HL (hearing loss)        PAST SURGICAL HISTORY:   Past Surgical History:   Procedure Laterality Date    COLONOSCOPY  2010    every 5 years/ DR Daugherty    HAND SURGERY  2/2012    thumb    HERNIA REPAIR  1990's    left    LASER DIODE STAPEDOTOMY Left 1/12/2015    Procedure: LASER DIODE STAPEDOTOMY;  Surgeon: Tasha Garcia MD;  Location: [facility] OR    STRESS TEST  age 48    WNL    TONSILLECTOMY  age 8    TYMPANOMASTOIDECTOMY  2010/2011    facial nerve tumors    TYMPANOMASTOIDECTOMY WITH FACIAL MONITORING  12/19/2011    Procedure:TYMPANOMASTOIDECTOMY WITH FACIAL MONITORING; Left Revision Tympanomastoidectomy with Partial  Ossicular Chain Reconstruction, using Facial Nerve Monitoring **latex safe room; Surgeon:TASHA GARCIA; Location:[facility] OR       MEDICATIONS:   No current outpatient medications on file.     No current facility-administered medications for this visit.        ALLERGIES:    Allergies   Allergen Reactions    Nkda [No Known Drug  Allergy]        FAMILY HISTORY:   Family History   Problem Relation Age of Onset    Breast Cancer Mother 60         67    Hypertension Mother     Chronic Obstructive Pulmonary Disease Father 81    Hypertension Father     Hypertension Sister     Crohn's Disease Sister     Cancer Sister     Hypertension Brother     Diabetes No family hx of     Prostate Cancer No family hx of         SOCIAL HISTORY:   Social History     Socioeconomic History    Marital status:      Spouse name: Not on file    Number of children: 4    Years of education: Not on file    Highest education level: Not on file   Occupational History     Employer: [employer]   Tobacco Use    Smoking status: Never    Smokeless tobacco: Never   Substance and Sexual Activity    Alcohol use: Yes     Comment: 2 drinks/month    Drug use: No    Sexual activity: Yes     Partners: Female     Birth control/protection: Post-menopausal   Other Topics Concern     Service No    Blood Transfusions No    Caffeine Concern No    Occupational Exposure No    Hobby Hazards No    Sleep Concern No    Stress Concern Yes     Comment: wife not working    Weight Concern No    Special Diet No    Back Care No    Exercise Yes     Comment: manages a store/ active    Bike Helmet Yes    Seat Belt Yes    Self-Exams Not Asked   Social History Narrative    Not on file     Social Determinants of Health     Financial Resource Strain: Not on file   Food Insecurity: Not on file   Transportation Needs: Not on file   Physical Activity: Not on file   Stress: Not on file   Social Connections: Not on file   Intimate Partner Violence: Not on file   Housing Stability: Not on file       ROS: 10 point ROS neg other than the symptoms noted above in the HPI.    PHYSICAL EXAMINATION:   Constitutional:  Pleasant well-appearing in no apparent distress  Eyes:   Extraocular movements are intact. Wearing glasses.  Ears:  Left: external ear and pinna are within normal limits; external auditory  canal is clear and of large caliber. Cerumen and granulation tissue suctioned from left ear canal. After cleaning, TM appears inflamed PE tube in anterior inferior space. Effusion noted with no active drainage from PE tube. Right: external ear and pinna are within normal limits; external auditory canal is clear and of large caliber. Normal tympanic membrane landmarks; Middle ear space is well ventilated. PE tube in place.  Head and neck: Head is normocephalic and atraumatic. No external deformity of nose. Neck is supple with midline trachea.   Respiratory:  Easy work of breathing. No stertor or stridor.   Skin:  No lesions on the head and neck.   Neurologic: Alert and oriented.  Psychiatric:  Normal affect.     Otologic microscope exam:    Biocular Microscopy exam is needed due to history of PE tubes in left ear, requiring direct visualization for use of cleaning instruments.    Left ear was examined under the microscope.  Cerumen noted. It was cleaned with right angle hook. Cerumen was adjoined with granulation tissue. Once cleaned, TM visualized under microscope. TM erythematous and thickened. PE tube in place.    AUDIOGRAM:      Patient underwent hearing test on 7/11/23  Right: Speech reception threshold is  60 dB with 88% word recognition at 90 dB. Tympanogram B type   Left: Speech reception threshold is 50 dB with 100% word recognition at 90 dB. Tympanogram B type      Audiogram was independently reviewed.    Assessment and Plan:  1. Conductive hearing loss, left  2. Otitis externa, left  3. OME, left  Exam consistent with infection of left ear canal. PE tube is likely blocked with backup of fluid behind TM. Sent prescription of ciprodex for patient to use for 2 weeks. He should return at that time for repeat audiogram and exam.        Patient will follow up in 2 weeks.    Viola Slater PA-C  Otolaryngology  Head & Neck Surgery  [phone]    Review of external notes as documented elsewhere in note  15  minutes spent by me on the date of the encounter doing chart review, history and exam, documentation and further activities per the note not including above procedure.

## 2023-07-14 NOTE — PROGRESS NOTES
Otolaryngology Clinic  2023    Chief Complaint:   Hearing loss    History of Present Illness:   Lennox Acuna is a 68 year old seen in Otology/Neurotology Clinic today for evaluation of increased hearing loss in his left ear. Patient reports noticing a decrease in the hearing in his left ear on 2023. His ear has been draining since then. He denies any pain or dizziness. He explains that he believes his prosthesis on the left has moved creating this hearing loss. He is a prior patient of Dr. Garcia and Dr. Hodges and was last seen in  for bilateral PE tube placement.       PAST MEDICAL HISTORY:   Past Medical History:   Diagnosis Date     HL (hearing loss)        PAST SURGICAL HISTORY:   Past Surgical History:   Procedure Laterality Date     COLONOSCOPY      every 5 years/ DR Daugherty     HAND SURGERY  2012    thumb     HERNIA REPAIR      left     LASER DIODE STAPEDOTOMY Left 2015    Procedure: LASER DIODE STAPEDOTOMY;  Surgeon: Tasha Garcia MD;  Location: [facility] OR     STRESS TEST  age 48    WNL     TONSILLECTOMY  age 8     TYMPANOMASTOIDECTOMY      facial nerve tumors     TYMPANOMASTOIDECTOMY WITH FACIAL MONITORING  2011    Procedure:TYMPANOMASTOIDECTOMY WITH FACIAL MONITORING; Left Revision Tympanomastoidectomy with Partial  Ossicular Chain Reconstruction, using Facial Nerve Monitoring **latex safe room; Surgeon:TASHA GARCIA; Location:[facility] OR       MEDICATIONS:   No current outpatient medications on file.     No current facility-administered medications for this visit.        ALLERGIES:    Allergies   Allergen Reactions     Nkda [No Known Drug Allergy]        FAMILY HISTORY:   Family History   Problem Relation Age of Onset     Breast Cancer Mother 60         67     Hypertension Mother      Chronic Obstructive Pulmonary Disease Father 81     Hypertension Father      Hypertension Sister      Crohn's Disease Sister      Cancer Sister      Hypertension  Brother      Diabetes No family hx of      Prostate Cancer No family hx of         SOCIAL HISTORY:   Social History     Socioeconomic History     Marital status:      Spouse name: Not on file     Number of children: 4     Years of education: Not on file     Highest education level: Not on file   Occupational History     Employer: [employer]   Tobacco Use     Smoking status: Never     Smokeless tobacco: Never   Substance and Sexual Activity     Alcohol use: Yes     Comment: 2 drinks/month     Drug use: No     Sexual activity: Yes     Partners: Female     Birth control/protection: Post-menopausal   Other Topics Concern      Service No     Blood Transfusions No     Caffeine Concern No     Occupational Exposure No     Hobby Hazards No     Sleep Concern No     Stress Concern Yes     Comment: wife not working     Weight Concern No     Special Diet No     Back Care No     Exercise Yes     Comment: manages a store/ active     Bike Helmet Yes     Seat Belt Yes     Self-Exams Not Asked   Social History Narrative     Not on file     Social Determinants of Health     Financial Resource Strain: Not on file   Food Insecurity: Not on file   Transportation Needs: Not on file   Physical Activity: Not on file   Stress: Not on file   Social Connections: Not on file   Intimate Partner Violence: Not on file   Housing Stability: Not on file       ROS: 10 point ROS neg other than the symptoms noted above in the HPI.    PHYSICAL EXAMINATION:   Constitutional:  Pleasant well-appearing in no apparent distress  Eyes:   Extraocular movements are intact. Wearing glasses.  Ears:  Left: external ear and pinna are within normal limits; external auditory canal is clear and of large caliber. Cerumen and granulation tissue suctioned from left ear canal. After cleaning, TM appears inflamed PE tube in anterior inferior space. Effusion noted with no active drainage from PE tube. Right: external ear and pinna are within normal limits;  external auditory canal is clear and of large caliber. Normal tympanic membrane landmarks; Middle ear space is well ventilated. PE tube in place.  Head and neck: Head is normocephalic and atraumatic. No external deformity of nose. Neck is supple with midline trachea.   Respiratory:  Easy work of breathing. No stertor or stridor.   Skin:  No lesions on the head and neck.   Neurologic: Alert and oriented.  Psychiatric:  Normal affect.     Otologic microscope exam:    Biocular Microscopy exam is needed due to history of PE tubes in left ear, requiring direct visualization for use of cleaning instruments.    Left ear was examined under the microscope.  Cerumen noted. It was cleaned with right angle hook. Cerumen was adjoined with granulation tissue. Once cleaned, TM visualized under microscope. TM erythematous and thickened. PE tube in place.    AUDIOGRAM:      Patient underwent hearing test on 7/11/23  Right: Speech reception threshold is  60 dB with 88% word recognition at 90 dB. Tympanogram B type   Left: Speech reception threshold is 50 dB with 100% word recognition at 90 dB. Tympanogram B type      Audiogram was independently reviewed.    Assessment and Plan:  1. Conductive hearing loss, left  2. Otitis externa, left  3. OME, left  Exam consistent with infection of left ear canal. PE tube is likely blocked with backup of fluid behind TM. Sent prescription of ciprodex for patient to use for 2 weeks. He should return at that time for repeat audiogram and exam.        Patient will follow up in 2 weeks.    Viola Slater PA-C  Otolaryngology  Head & Neck Surgery  [phone]    Review of external notes as documented elsewhere in note  15 minutes spent by me on the date of the encounter doing chart review, history and exam, documentation and further activities per the note not including above procedure.

## 2023-07-20 ENCOUNTER — TELEPHONE (OUTPATIENT)
Dept: AUDIOLOGY | Facility: CLINIC | Age: 68
End: 2023-07-20
Payer: COMMERCIAL

## 2023-07-20 DIAGNOSIS — H60.92 OTITIS EXTERNA OF LEFT EAR, UNSPECIFIED CHRONICITY, UNSPECIFIED TYPE: Primary | ICD-10-CM

## 2023-07-20 RX ORDER — CIPROFLOXACIN HYDROCHLORIDE 3.5 MG/ML
4 SOLUTION/ DROPS TOPICAL 2 TIMES DAILY
Qty: 5 ML | Refills: 0 | Status: SHIPPED | OUTPATIENT
Start: 2023-07-20 | End: 2023-12-20

## 2023-07-20 RX ORDER — PREDNISOLONE ACETATE 10 MG/ML
4 SUSPENSION/ DROPS OPHTHALMIC 2 TIMES DAILY
Qty: 5 ML | Refills: 0 | Status: SHIPPED | OUTPATIENT
Start: 2023-07-20 | End: 2023-12-20

## 2023-07-20 NOTE — TELEPHONE ENCOUNTER
[facility] Call Center    Phone Message    May a detailed message be left on voicemail: yes     Reason for Call: Other: Pharmacy called stating that the Cipro isn't covered by the pt's insurance and requesting a new prescription to be sent ot the pharmacy. Please call pharmacy to discuss if needed.      Action Taken: Other: [facility]    Travel Screening: Not Applicable

## 2023-07-27 DIAGNOSIS — H90.A12 CONDUCTIVE HEARING LOSS OF LEFT EAR WITH RESTRICTED HEARING OF RIGHT EAR: Primary | ICD-10-CM

## 2023-07-28 ENCOUNTER — OFFICE VISIT (OUTPATIENT)
Dept: OTOLARYNGOLOGY | Facility: CLINIC | Age: 68
End: 2023-07-28
Payer: COMMERCIAL

## 2023-07-28 VITALS
HEIGHT: 67 IN | SYSTOLIC BLOOD PRESSURE: 154 MMHG | BODY MASS INDEX: 31.71 KG/M2 | HEART RATE: 71 BPM | DIASTOLIC BLOOD PRESSURE: 87 MMHG | TEMPERATURE: 97.9 F | OXYGEN SATURATION: 94 % | WEIGHT: 202 LBS

## 2023-07-28 DIAGNOSIS — H71.92 CHOLESTEATOMA OF LEFT EAR: ICD-10-CM

## 2023-07-28 DIAGNOSIS — H90.12 CONDUCTIVE HEARING LOSS OF LEFT EAR, UNSPECIFIED HEARING STATUS ON CONTRALATERAL SIDE: Primary | ICD-10-CM

## 2023-07-28 PROCEDURE — 99213 OFFICE O/P EST LOW 20 MIN: CPT | Performed by: PHYSICIAN ASSISTANT

## 2023-07-28 ASSESSMENT — PAIN SCALES - GENERAL: PAINLEVEL: NO PAIN (0)

## 2023-07-28 NOTE — PROGRESS NOTES
Otolaryngology Clinic  July 28, 2023    HPI:  Lennox Acuna is here for follow up after treatment for left otitis externa and hearing loss. He reports his hearing is unchanged and he has not noticed any drainage or pain from the left ear. He continues to express his belief that his stapes prosthesis is out of place and makes it clear that he would like a CT scan to visualize the position of his prosthesis.       Otologic microscope exam:    Biocular Microscopy exam is needed due to surgical history and recent otitis externa of left ear, requiring direct visualization for use of cleaning instruments.    Left ear was examined under the microscope. PE tube in place with no crusting or drainage present. PE tube is patent with no retraction of the TM. Nicely aerated middle ear space. No cerumen, purulence, or inflammation of the canal or TM.    Assessment and Plan:  1. Conductive hearing loss of left ear, unspecified hearing status on contralateral side  Upon chart review, it appears patient has had difficulty with hearing from his left ear since his revision prosthesis placement in 2015. At this time the otitis externa and effusion on the left side has resolved. A temporal bone CT has been ordered to evaluate the position of the stapes prosthesis. Imaging will be reviewed with Dr. Moore or Dr. Hodges and communication with the patient will be made concerning results and/or follow up.    - CT Temporal Bones wo Contrast; Future        Patient will follow up as directed    Viola Slater PA-C  Otolaryngology  Head & Neck Surgery  [phone]    20 minutes spent on the date of the encounter doing chart review, history and exam, documentation and further activities per the note

## 2023-07-28 NOTE — LETTER
7/28/2023       RE: Lennox Acuna  [address] [facility] [address]     Dear Colleague,    Thank you for referring your patient, Lennox Acuna, to the [facility] EAR NOSE AND THROAT CLINIC [place] at [facility]. Please see a copy of my visit note below.      Otolaryngology Clinic  July 28, 2023    HPI:  Lennox Acuna is here for follow up after treatment for left otitis externa and hearing loss. He reports his hearing is unchanged and he has not noticed any drainage or pain from the left ear. He continues to express his belief that his stapes prosthesis is out of place and makes it clear that he would like a CT scan to visualize the position of his prosthesis.       Otologic microscope exam:    Biocular Microscopy exam is needed due to surgical history and recent otitis externa of left ear, requiring direct visualization for use of cleaning instruments.    Left ear was examined under the microscope. PE tube in place with no crusting or drainage present. PE tube is patent with no retraction of the TM. Nicely aerated middle ear space. No cerumen, purulence, or inflammation of the canal or TM.    Assessment and Plan:  1. Conductive hearing loss of left ear, unspecified hearing status on contralateral side  Upon chart review, it appears patient has had difficulty with hearing from his left ear since his revision prosthesis placement in 2015. At this time the otitis externa and effusion on the left side has resolved. A temporal bone CT has been ordered to evaluate the position of the stapes prosthesis. Imaging will be reviewed with Dr. Moore or Dr. Hodges and communication with the patient will be made concerning results and/or follow up.    - CT Temporal Bones wo Contrast; Future        Patient will follow up as directed    Viola Slater PA-C  Otolaryngology  Head & Neck Surgery  [phone]    20 minutes spent on the date of the encounter doing  chart review, history and exam, documentation and further activities per the note

## 2023-07-28 NOTE — NURSING NOTE
"Chief Complaint   Patient presents with    RECHECK     2 week follow up with audio    .  Blood pressure (!) 154/87, pulse 71, temperature 97.9  F (36.6  C), height 1.702 m (5' 7\"), weight 91.6 kg (202 lb), SpO2 94 %.    Robb Wiggins LPN    "

## 2023-08-08 ENCOUNTER — HOSPITAL ENCOUNTER (OUTPATIENT)
Dept: CT IMAGING | Facility: CLINIC | Age: 68
Discharge: HOME | End: 2023-08-08
Attending: PHYSICIAN ASSISTANT | Admitting: PHYSICIAN ASSISTANT
Payer: COMMERCIAL

## 2023-08-08 DIAGNOSIS — H90.12 CONDUCTIVE HEARING LOSS OF LEFT EAR, UNSPECIFIED HEARING STATUS ON CONTRALATERAL SIDE: ICD-10-CM

## 2023-08-08 PROCEDURE — 70480 CT ORBIT/EAR/FOSSA W/O DYE: CPT

## 2023-09-21 ENCOUNTER — TRANSFERRED RECORDS (OUTPATIENT)
Dept: FAMILY MEDICINE | Facility: CLINIC | Age: 68
End: 2023-09-21

## 2023-09-21 ENCOUNTER — LAB REQUISITION (OUTPATIENT)
Dept: LAB | Facility: CLINIC | Age: 68
End: 2023-09-21
Payer: COMMERCIAL

## 2023-09-21 DIAGNOSIS — Z86.0100 PERSONAL HISTORY OF COLONIC POLYPS: ICD-10-CM

## 2023-09-21 PROCEDURE — 88305 TISSUE EXAM BY PATHOLOGIST: CPT | Mod: TC,ORL | Performed by: COLON & RECTAL SURGERY

## 2023-09-22 LAB
PATH REPORT.COMMENTS IMP SPEC: NORMAL
PATH REPORT.FINAL DX SPEC: NORMAL
PATH REPORT.GROSS SPEC: NORMAL
PATH REPORT.MICROSCOPIC SPEC OTHER STN: NORMAL
PATH REPORT.RELEVANT HX SPEC: NORMAL
PHOTO IMAGE: NORMAL

## 2023-09-22 PROCEDURE — 88305 TISSUE EXAM BY PATHOLOGIST: CPT | Mod: 26 | Performed by: PATHOLOGY

## 2023-11-13 ENCOUNTER — TRANSFERRED RECORDS (OUTPATIENT)
Dept: FAMILY MEDICINE | Facility: CLINIC | Age: 68
End: 2023-11-13

## 2023-12-20 ENCOUNTER — OFFICE VISIT (OUTPATIENT)
Dept: FAMILY MEDICINE | Facility: CLINIC | Age: 68
End: 2023-12-20

## 2023-12-20 VITALS
RESPIRATION RATE: 20 BRPM | TEMPERATURE: 97.9 F | HEART RATE: 85 BPM | BODY MASS INDEX: 31.08 KG/M2 | SYSTOLIC BLOOD PRESSURE: 150 MMHG | OXYGEN SATURATION: 97 % | DIASTOLIC BLOOD PRESSURE: 98 MMHG | HEIGHT: 67 IN | WEIGHT: 198 LBS

## 2023-12-20 DIAGNOSIS — Z13.220 SCREENING FOR LIPID DISORDERS: ICD-10-CM

## 2023-12-20 DIAGNOSIS — R06.81 WITNESSED APNEIC SPELLS: ICD-10-CM

## 2023-12-20 DIAGNOSIS — R06.83 SNORING: ICD-10-CM

## 2023-12-20 DIAGNOSIS — I10 BENIGN ESSENTIAL HYPERTENSION: ICD-10-CM

## 2023-12-20 DIAGNOSIS — Z13.228 SCREENING FOR METABOLIC DISORDER: ICD-10-CM

## 2023-12-20 DIAGNOSIS — Z00.00 ENCOUNTER FOR GENERAL HEALTH EXAMINATION: Primary | ICD-10-CM

## 2023-12-20 DIAGNOSIS — R73.03 PREDIABETES: ICD-10-CM

## 2023-12-20 LAB
ALBUMIN SERPL-MCNC: 3.9 G/DL (ref 3.6–5.1)
ALBUMIN/GLOB SERPL: 1.1 {RATIO} (ref 1–2.5)
ALP SERPL-CCNC: 105 U/L (ref 33–130)
ALT 1742-6: 16 U/L (ref 0–32)
AST 1920-8: 16 U/L (ref 0–35)
BILIRUB SERPL-MCNC: 0.7 MG/DL (ref 0.2–1.2)
BUN SERPL-MCNC: 18 MG/DL (ref 7–25)
BUN/CREATININE RATIO: 17 (ref 6–32)
CALCIUM SERPL-MCNC: 10 MG/DL (ref 8.6–10.3)
CHLORIDE SERPLBLD-SCNC: 104.6 MMOL/L (ref 98–110)
CHOLEST SERPL-MCNC: 206 MG/DL (ref 0–199)
CHOLEST/HDLC SERPL: 3 {RATIO} (ref 0–5)
CO2 SERPL-SCNC: 27.9 MMOL/L (ref 20–32)
CREAT SERPL-MCNC: 1.06 MG/DL (ref 0.6–1.3)
GLOBULIN, CALCULATED - QUEST: 3.5 (ref 1.9–3.7)
GLUCOSE SERPL-MCNC: 85 MG/DL (ref 60–99)
HBA1C MFR BLD: 6.1 % (ref 4–7)
HDLC SERPL-MCNC: 63 MG/DL (ref 40–150)
LDLC SERPL CALC-MCNC: 129 MG/DL (ref 0–130)
POTASSIUM SERPL-SCNC: 4.57 MMOL/L (ref 3.5–5.3)
PROT SERPL-MCNC: 7.4 G/DL (ref 6.1–8.1)
SODIUM SERPL-SCNC: 139.9 MMOL/L (ref 135–146)
TRIGL SERPL-MCNC: 71 MG/DL (ref 0–149)

## 2023-12-20 PROCEDURE — 80053 COMPREHEN METABOLIC PANEL: CPT | Performed by: PHYSICIAN ASSISTANT

## 2023-12-20 PROCEDURE — 36415 COLL VENOUS BLD VENIPUNCTURE: CPT | Performed by: PHYSICIAN ASSISTANT

## 2023-12-20 PROCEDURE — 99397 PER PM REEVAL EST PAT 65+ YR: CPT | Mod: 25 | Performed by: PHYSICIAN ASSISTANT

## 2023-12-20 PROCEDURE — 80061 LIPID PANEL: CPT | Performed by: PHYSICIAN ASSISTANT

## 2023-12-20 PROCEDURE — G0438 PPPS, INITIAL VISIT: HCPCS | Performed by: PHYSICIAN ASSISTANT

## 2023-12-20 PROCEDURE — 83036 HEMOGLOBIN GLYCOSYLATED A1C: CPT | Performed by: PHYSICIAN ASSISTANT

## 2023-12-20 RX ORDER — LOSARTAN POTASSIUM 50 MG/1
50 TABLET ORAL DAILY
Qty: 30 TABLET | Refills: 1 | Status: SHIPPED | OUTPATIENT
Start: 2023-12-20 | End: 2024-02-19

## 2023-12-20 NOTE — PROGRESS NOTES
Lennox Acuna is a 68 year old male presents for routine health maintenance.    Current concerns:  Dysphagia:  Planning to have EGD next Tues. They are planning for dilation.     Sleep concerns:  Wife Nicole is with him today and is concerned for sleep apnea. States he snores, and will stop breathing for 5-10 seconds. She will nudge him to switch positions and usually gets better. Lennox feels like he is sleeping soundly.     Hand pain:  Continues to have significant pain in both hands, worse on left. Has been told it is at the point of needing surgery of left hand similar to surgery right hand had but pt felt this compromised dexterity. He is not been seen by his hand specialist in at least 1 year, so agreed to return, and may ask them about involving a rheumatologist.      Body mass index is 31.01 kg/m .    Present exercise habits:  3-5 times/week  Present dietary habits:  eats foods high in fat and follows a balanced nutrition diet    Vit D intake: is not taking supplement    Is the patient a smoker? No  If yes, smoking cessation advised and counseling provided.     Cardiovascular risk factors: none    Over the past few weeks, have you felt down or depressed? Little interest or pleasure in doing things? No concerns.     Last dental appointment:  this year  Last optical appointment:  this year    Was the patient born between [birth year] and has not had Hep C testing?  Has not been tested, declines testing    I have reviewed the following histories: Past Medical History, Past Surgical History, Social History, Family History, Problem List, Medication List and Allergies    Past Medical History:   Diagnosis Date    HL (hearing loss)      Family History   Problem Relation Age of Onset    Breast Cancer Mother 60         67    Hypertension Mother     Chronic Obstructive Pulmonary Disease Father 81    Hypertension Father     Hypertension Sister     Crohn's Disease Sister     Breast Cancer Sister     Hypertension Brother      Diabetes No family hx of     Prostate Cancer No family hx of      Social History     Socioeconomic History    Marital status:      Spouse name: Not on file    Number of children: 4    Years of education: Not on file    Highest education level: Not on file   Occupational History     Employer: [employer]   Tobacco Use    Smoking status: Never    Smokeless tobacco: Never   Substance and Sexual Activity    Alcohol use: Yes     Comment: 2 drinks/month    Drug use: No    Sexual activity: Yes     Partners: Female     Birth control/protection: Post-menopausal   Other Topics Concern     Service No    Blood Transfusions No    Caffeine Concern No    Occupational Exposure No    Hobby Hazards No    Sleep Concern No    Stress Concern Yes     Comment: wife not working    Weight Concern No    Special Diet No    Back Care No    Exercise Yes     Comment: manages a store/ active    Bike Helmet Yes    Seat Belt Yes    Self-Exams Not Asked   Social History Narrative    Not on file     Social Determinants of Health     Financial Resource Strain: Not on file   Food Insecurity: Not on file   Transportation Needs: Not on file   Physical Activity: Not on file   Stress: Not on file   Social Connections: Not on file   Interpersonal Safety: Not on file   Housing Stability: Not on file     Patient Active Problem List   Diagnosis    Conductive hearing loss in left ear    Cholesteatoma    History of gastric ulcer    Health Care Home (V65.8)    ACP (advance care planning)    Internal derangement of knee    History of diverticulitis    Gastroesophageal reflux disease with esophagitis without hemorrhage     Current Outpatient Medications   Medication    losartan (COZAAR) 50 MG tablet     No current facility-administered medications for this visit.       Allergies:    Allergies   Allergen Reactions    Nkda [No Known Drug Allergy]          ROS:  E/M: NEGATIVE for ear, nose, mouth and throat problems  R: NEGATIVE for  "significant/chronic cough or SOB  CV: NEGATIVE for chest pain or palpitations  GI: NEGATIVE for abdominal pain, chronic diarrhea or constipation  : NEGATIVE for dysuria, hematuria, weakened urinary stream    OBJECTIVE:    Vitals:    12/20/23 1114   BP: (!) 150/98   BP Location: Left arm   Patient Position: Sitting   Cuff Size: Adult Large   Pulse: 85   Resp: 20   Temp: 97.9  F (36.6  C)   TempSrc: Oral   SpO2: 97%   Weight: 89.8 kg (198 lb)   Height: 1.702 m (5' 7\")       General: 68 year old male who appears his stated age. Vital signs noted.  Head: Normocephalic  Eyes: pupils equal round reactive to light and accomodation, extra ocular movements intact  Ears: external canals and tms free of abnormalities  Nose: patent, without mucosal abnormalities  Mouth and throat: without erythema or lesions of the mucosa  Neck: supple, without adenopathy or thyromegaly  Lungs: clear to auscultation, no wheezing or crackles  Cv: regular rate and rhythm, normal s1 and s2 without murmur or click  Abd: soft, non-tender, no masses, no hepatomegaly or splenomegaly.  Gu: normal external genitalia, no hernia  Rectal: Deferred. No concerns.   Ms: normal muscle tone & symmetry  Skin: Clear to inspection  Neuro: sensation and motor function grossly intact; cranial nerves without obvious abnormalities.      ASSESSMENT/PLAN:    Encounter for general health examination  Lennox is doing relatively well today. Will update fasting labs and send MyChart. Declined PSA- no concerns or FH. Did discuss BP (see below). Encouraged him to return to ortho, and I will submit sleep eval referral.     Benign essential hypertension  Pt feels that HTN is due to pain, which I agree may be contributing, but recommended we treat BP at this point. Recommended trial of losartan 50 mg. Can start with 1/2 tablet for first 1 week before increasing to full tablet. Warned of side effects. Try to monitor BP at home. Return to clinic in 1-2 months to recheck.   - " "VENOUS COLLECTION  - losartan (COZAAR) 50 MG tablet; Take 1 tablet (50 mg) by mouth daily    Prediabetes  - VENOUS COLLECTION  - HEMOGLOBIN A1C (BFP)    Screening for metabolic disorder  - VENOUS COLLECTION  - Comprehensive Metobolic Panel (BFP)    Screening for lipid disorders  - VENOUS COLLECTION  - Lipid Panel (BFP)     reports that he has never smoked. He has never used smokeless tobacco.      Estimated body mass index is 31.01 kg/m  as calculated from the following:    Height as of this encounter: 1.702 m (5' 7\").    Weight as of this encounter: 89.8 kg (198 lb).  Weight management plan: Discussed healthy diet and exercise guidelines      Labs pending:       Fasting lipids  Meds Suggested:      Vitamin D       Calcium  Tests Recommended:      Regular Dental Examinations        Eye exam  Behavior Modifications:       Cardiovascular exercise 3 times per week--enough to get your Target Heart rate       Compliance issues with diet and lifestyle discussed  Other recommendations:    Health Care directive     BMI noted and discussed      Regular testicle exam     Encouraged My Chart    The patient will return to the clinic if symptoms are changing or concern with follow up as discussed. The patient understands and agrees with the plan.      Maria Luisa Aguila PA-C  12/20/2023    Counseling Resources:  ATP IV Guidelines  Pooled Cohorts Equation Calculator  Breast Cancer Risk Calculator  FRAX Risk Assessment  ICSI Preventive Guidelines  Dietary Guidelines for Americans, 2010  USDA's MyPlate    "

## 2023-12-20 NOTE — PROGRESS NOTES
Lennox Acuna is a 68 year old male who presents for Medicare Annual Wellness Visit.    Current providers caring for this patient include:  Patient Care Team:  Maria Luisa Aguila PA-C as PCP - General (Physician Assistant)  Maria Luisa Aguila PA-C as Assigned PCP  Viola Slater PA-C as Assigned Surgical Provider    Complete Medical and Social history reviewed with patient, outlined below.    Patient Active Problem List   Diagnosis    Conductive hearing loss in left ear    Cholesteatoma    History of gastric ulcer    Health Care Home (V65.8)    ACP (advance care planning)    Internal derangement of knee    History of diverticulitis    Gastroesophageal reflux disease with esophagitis without hemorrhage       Past Medical History:   Diagnosis Date    HL (hearing loss)        Past Surgical History:   Procedure Laterality Date    COLONOSCOPY      every 5 years/ DR Daugherty    HAND SURGERY  2012    thumb    HERNIA REPAIR      left    LASER DIODE STAPEDOTOMY Left 2015    Procedure: LASER DIODE STAPEDOTOMY;  Surgeon: Tasha Garcia MD;  Location: [facility] OR    STRESS TEST  age 48    WNL    TONSILLECTOMY  age 8    TYMPANOMASTOIDECTOMY      facial nerve tumors    TYMPANOMASTOIDECTOMY WITH FACIAL MONITORING  2011    Procedure:TYMPANOMASTOIDECTOMY WITH FACIAL MONITORING; Left Revision Tympanomastoidectomy with Partial  Ossicular Chain Reconstruction, using Facial Nerve Monitoring **latex safe room; Surgeon:TASHA GARCIA; Location:[facility] OR       Family History   Problem Relation Age of Onset    Breast Cancer Mother 60         67    Hypertension Mother     Chronic Obstructive Pulmonary Disease Father 81    Hypertension Father     Hypertension Sister     Crohn's Disease Sister     Breast Cancer Sister     Hypertension Brother     Diabetes No family hx of     Prostate Cancer No family hx of        Social History     Tobacco Use    Smoking status: Never    Smokeless tobacco:  "Never   Substance Use Topics    Alcohol use: Yes     Comment: 2 drinks/month       Diet: feels that he he could be better with diet-does not feel he oversalts his food but does sometimes add this for taste-does not eat a lot of fruits and vegetables  Physical Activity: active without specific exercise program-tries to walk as much as he can-works at [employer] and gets a lot of steps in per day   Depression Screen:    Over the past 2 weeks, patient has felt down, depressed, or hopeless:  No    Over the past 2 weeks, patient has felt little interest or pleasure in doing things: No    Functional ability/Safety screen:  Up and go test (able to get up and walk longer than 30 seconds): Passed  Patient needs assistance with: nothing  Patient's home has the following possible safety concerns: none identified  Patient has concerns about his hearing:  No  Cognitive Screen  Patient repeats three objects (ball, flag, tree)      Clock drawing test:   NORMAL  Recalls three objects after 3 minutes (ball,flag,tree):                                                                                               recalls 3 objects (3 points)    Physical Exam:  BP (!) 150/98 (BP Location: Left arm, Patient Position: Sitting, Cuff Size: Adult Large)   Pulse 85   Temp 97.9  F (36.6  C) (Oral)   Resp 20   Ht 1.702 m (5' 7\")   Wt 89.8 kg (198 lb)   SpO2 97%   BMI 31.01 kg/m     Body mass index is 31.01 kg/m .       End of Life Planning:   Patient currently has an advanced directive: No.  I have verified the patient's ablity to prepare an advanced directive/make health care decisions.  Literature was provided to assist patient in preparing an advanced directive.    Education/Counseling:   Based on review of the above information, the following items were addressed:      Elevated blood pressure - follow-up plans made    Appropriate preventive services were discussed with this patient, including applicable screening as appropriate for " cardiovascular disease, diabetes, osteopenia/osteoporosis, and glaucoma.  As appropriate for age/gender, discussed screening for colorectal cancer, prostate cancer, breast cancer, and cervical cancer.   Checklist reviewing preventive services available has been given to the patient.    Maria Luisa Aguila PA-C  12/20/2023

## 2023-12-20 NOTE — NURSING NOTE
Chief Complaint   Patient presents with    Physical     Annual, fasting     Medicare Visit     Annual medicare wellness visit      Pre-visit Screening:  Immunizations:  not up to date - due for prevnar 20 butwill get at [facility]-works there and wants to do all vaccines there   Colonoscopy:  is up to date-had done 2 months ago-will have records sent to us   Mammogram: rigoberto  Asthma Action Test/Plan:  rigoberto  PHQ9:  PHQ 2 done today   GAD7:  rigoberto  Questioned patient about current smoking habits Pt. has never smoked.  Ok to leave detailed message on voice mail for today's visit only yes, phone # [phone] (home)

## 2024-01-09 ENCOUNTER — PATIENT OUTREACH (OUTPATIENT)
Dept: GASTROENTEROLOGY | Facility: CLINIC | Age: 69
End: 2024-01-09
Payer: COMMERCIAL

## 2024-02-19 ENCOUNTER — MYC MEDICAL ADVICE (OUTPATIENT)
Dept: FAMILY MEDICINE | Facility: CLINIC | Age: 69
End: 2024-02-19

## 2024-02-19 DIAGNOSIS — I10 BENIGN ESSENTIAL HYPERTENSION: ICD-10-CM

## 2024-02-19 NOTE — TELEPHONE ENCOUNTER
Patient is scheduled for 2/27 but needs extension of  Pending Prescriptions:                       Disp   Refills    losartan (COZAAR) 50 MG tablet            14 tab*0            Sig: Take 1 tablet (50 mg) by mouth daily    Routing to Maria Luisa to send in.

## 2024-02-20 RX ORDER — LOSARTAN POTASSIUM 50 MG/1
50 TABLET ORAL DAILY
Qty: 14 TABLET | Refills: 0 | Status: SHIPPED | OUTPATIENT
Start: 2024-02-20 | End: 2024-02-27

## 2024-02-27 ENCOUNTER — OFFICE VISIT (OUTPATIENT)
Dept: FAMILY MEDICINE | Facility: CLINIC | Age: 69
End: 2024-02-27

## 2024-02-27 VITALS
HEART RATE: 72 BPM | DIASTOLIC BLOOD PRESSURE: 78 MMHG | TEMPERATURE: 98.2 F | SYSTOLIC BLOOD PRESSURE: 144 MMHG | BODY MASS INDEX: 31.17 KG/M2 | OXYGEN SATURATION: 96 % | WEIGHT: 199 LBS

## 2024-02-27 DIAGNOSIS — I10 BENIGN ESSENTIAL HYPERTENSION: ICD-10-CM

## 2024-02-27 LAB
BUN SERPL-MCNC: 23 MG/DL (ref 7–25)
BUN/CREATININE RATIO: 20.2 (ref 6–32)
CALCIUM SERPL-MCNC: 9.2 MG/DL (ref 8.6–10.3)
CHLORIDE SERPLBLD-SCNC: 108.4 MMOL/L (ref 98–110)
CO2 SERPL-SCNC: 25.2 MMOL/L (ref 20–32)
CREAT SERPL-MCNC: 1.14 MG/DL (ref 0.6–1.3)
GLUCOSE SERPL-MCNC: 93 MG/DL (ref 60–99)
POTASSIUM SERPL-SCNC: 4.52 MMOL/L (ref 3.5–5.3)
SODIUM SERPL-SCNC: 141.3 MMOL/L (ref 135–146)

## 2024-02-27 PROCEDURE — 80048 BASIC METABOLIC PNL TOTAL CA: CPT | Performed by: PHYSICIAN ASSISTANT

## 2024-02-27 PROCEDURE — 99213 OFFICE O/P EST LOW 20 MIN: CPT | Performed by: PHYSICIAN ASSISTANT

## 2024-02-27 PROCEDURE — 36415 COLL VENOUS BLD VENIPUNCTURE: CPT | Performed by: PHYSICIAN ASSISTANT

## 2024-02-27 RX ORDER — LOSARTAN POTASSIUM 50 MG/1
50 TABLET ORAL DAILY
Qty: 90 TABLET | Refills: 0 | Status: SHIPPED | OUTPATIENT
Start: 2024-02-27 | End: 2024-05-03

## 2024-02-27 RX ORDER — CHLORTHALIDONE 25 MG/1
25 TABLET ORAL DAILY
Qty: 30 TABLET | Refills: 1 | Status: SHIPPED | OUTPATIENT
Start: 2024-02-27 | End: 2024-04-23

## 2024-02-27 RX ORDER — OMEPRAZOLE 40 MG/1
40 CAPSULE, DELAYED RELEASE ORAL DAILY
COMMUNITY

## 2024-02-27 NOTE — PROGRESS NOTES
CC: Medication Check    History:  HTN:  Was started on losartan 50 mg daily 12/20/2023. Has been taking this consistently. No side effects from this medication. Has been checking BP at home with new cuff. Checks several times per week, and getting 145-165/83-97. At the time of that appt, was dealing with hand pain, which overall had improved, but currently worse the past 2 days with weather change.     PMH, MEDICATIONS, ALLERGIES, SOCIAL AND FAMILY HISTORY in [facility] and reviewed by me personally.    ROS negative other than the symptoms noted above in the HPI.      Examination   BP (!) 144/78 (BP Location: Right arm, Patient Position: Sitting, Cuff Size: Adult Large)   Pulse 72   Temp 98.2  F (36.8  C) (Temporal)   Wt 90.3 kg (199 lb)   SpO2 96%   BMI 31.17 kg/m       Constitutional: Sitting comfortably, in no acute distress. Vital signs noted  Neck:  no adenopathy, trachea midline and normal to palpation  Cardiovascular:  regular rate and rhythm, no murmurs, clicks, or gallops  Respiratory:  normal respiratory rate and rhythm, lungs clear to auscultation  SKIN: No jaundice/pallor/rash.   Psychiatric: mentation appears normal and affect normal/bright      A/P    ICD-10-CM    1. Benign essential hypertension  I10 losartan (COZAAR) 50 MG tablet     VENOUS COLLECTION     Basic Metabolic Panel (BFP)     chlorthalidone (HYGROTON) 25 MG tablet          DISCUSSION:  HTN:  Blood pressure today and at home remains above goal of <130/80. Recommended continue on losartan without change, but will add in trial of chlorthalidone 25 mg daily. Monitor for side effects, and contact us if noted. Encouraged patient to incorporate more potassium in diet, may have to add supplement if potassium goes low with this trial. Try to cut down sodium if possible (eats Asian take out once weekly).    BP readings at home are slightly higher than today- encouraged him to bring home BP cuff with him to next appt.     Will update non-fasting lab  today and send MyChart.     follow up visit: 1-2 months    Maria Luisa Aguila PA-C  [facility]

## 2024-02-27 NOTE — PATIENT INSTRUCTIONS
HTN:  Blood pressure today and at home remains above goal of <130/80. Recommended continue on losartan without change, but will add in trial of chlorthalidone 25 mg daily. Monitor for side effects, and contact us if noted. Encouraged patient to incorporate more potassium in diet, may have to add supplement if potassium goes low with this trial. Try to cut down sodium if possible (eats Asian take out once weekly).    BP readings at home are slightly higher than today- encouraged him to bring home BP cuff with him to next appt.     Will update non-fasting lab today and send MyChart.     follow up visit: 1-2 months

## 2024-02-27 NOTE — NURSING NOTE
Chief Complaint   Patient presents with    Recheck Medication     Recheck on losartan, he has now been on this almost 2 months, he has been checking BP irregularly and it is anywhere from, 145//95     Pre-visit Screening:  Immunizations:  not up to date - pt will do prevnar 20 and shingrix at pharmacy  Colonoscopy:  is up to date  Mammogram: YUDY  Asthma Action Test/Plan:  YUDY  PHQ9:  YUDY  GAD7:  NA  Questioned patient about current smoking habits Pt. has never smoked.  Ok to leave detailed message on voice mail for today's visit only Yes, phone # [phone]

## 2024-03-08 ENCOUNTER — TRANSFERRED RECORDS (OUTPATIENT)
Dept: FAMILY MEDICINE | Facility: CLINIC | Age: 69
End: 2024-03-08

## 2024-04-03 ENCOUNTER — TRANSFERRED RECORDS (OUTPATIENT)
Dept: FAMILY MEDICINE | Facility: CLINIC | Age: 69
End: 2024-04-03

## 2024-04-23 ENCOUNTER — OFFICE VISIT (OUTPATIENT)
Dept: FAMILY MEDICINE | Facility: CLINIC | Age: 69
End: 2024-04-23

## 2024-04-23 VITALS
DIASTOLIC BLOOD PRESSURE: 78 MMHG | HEIGHT: 67 IN | SYSTOLIC BLOOD PRESSURE: 125 MMHG | HEART RATE: 79 BPM | OXYGEN SATURATION: 95 % | BODY MASS INDEX: 31.86 KG/M2 | TEMPERATURE: 97.8 F | WEIGHT: 203 LBS

## 2024-04-23 DIAGNOSIS — K20.0 EOSINOPHILIC ESOPHAGITIS: ICD-10-CM

## 2024-04-23 DIAGNOSIS — Z01.818 PREOPERATIVE EXAMINATION: Primary | ICD-10-CM

## 2024-04-23 DIAGNOSIS — Z87.11 HISTORY OF PEPTIC ULCER DISEASE: ICD-10-CM

## 2024-04-23 DIAGNOSIS — K31.1 ADULT HYPERTROPHIC PYLORIC STENOSIS: ICD-10-CM

## 2024-04-23 DIAGNOSIS — I10 BENIGN ESSENTIAL HYPERTENSION: ICD-10-CM

## 2024-04-23 DIAGNOSIS — R73.03 PREDIABETES: ICD-10-CM

## 2024-04-23 LAB
% GRANULOCYTES: 66.2 %
BUN SERPL-MCNC: 30 MG/DL (ref 7–25)
BUN/CREATININE RATIO: 20.3 (ref 6–32)
CALCIUM SERPL-MCNC: 8.9 MG/DL (ref 8.6–10.3)
CHLORIDE SERPLBLD-SCNC: 104.5 MMOL/L (ref 98–110)
CO2 SERPL-SCNC: 25.1 MMOL/L (ref 20–32)
CREAT SERPL-MCNC: 1.47 MG/DL (ref 0.6–1.3)
GFR SERPL CREATININE-BSD FRML MDRD: 50 ML/MIN/1.73M2
GLUCOSE SERPL-MCNC: 129 MG/DL (ref 60–99)
HCT VFR BLD AUTO: 46.2 % (ref 40–53)
HEMOGLOBIN: 15 G/DL (ref 13.3–17.7)
LYMPHOCYTES NFR BLD AUTO: 24 %
MCH RBC QN AUTO: 30.3 PG (ref 26–33)
MCHC RBC AUTO-ENTMCNC: 32.5 G/DL (ref 31–36)
MCV RBC AUTO: 93.3 FL (ref 78–100)
MONOCYTES NFR BLD AUTO: 9.8 %
PLATELET COUNT - QUEST: 354 10^9/L (ref 150–375)
POTASSIUM SERPL-SCNC: 3.94 MMOL/L (ref 3.5–5.3)
RBC # BLD AUTO: 4.95 10*12/L (ref 4.4–5.9)
SODIUM SERPL-SCNC: 136.3 MMOL/L (ref 135–146)
WBC # BLD AUTO: 8.8 10*9/L (ref 4–11)

## 2024-04-23 PROCEDURE — 80048 BASIC METABOLIC PNL TOTAL CA: CPT

## 2024-04-23 PROCEDURE — 85025 COMPLETE CBC W/AUTO DIFF WBC: CPT

## 2024-04-23 PROCEDURE — 36415 COLL VENOUS BLD VENIPUNCTURE: CPT

## 2024-04-23 PROCEDURE — 99214 OFFICE O/P EST MOD 30 MIN: CPT

## 2024-04-23 RX ORDER — CHLORTHALIDONE 25 MG/1
25 TABLET ORAL DAILY
Qty: 14 TABLET | Refills: 0 | Status: SHIPPED | OUTPATIENT
Start: 2024-04-23 | End: 2024-05-03

## 2024-04-23 NOTE — PROGRESS NOTES
Preoperative Evaluation  [facility]  [address] [place] [address]  [address]  [facility] [address]  Phone: [phone]  Fax: [phone]  Primary Provider: Maria Luisa Aguila  Pre-op Performing Provider: LUBNA CAREY  Apr 23, 2024       Lennox is a 69 year old, presenting for the following:  Pre-Op Exam (Surgery/Procedure: Upper EGD and colonoscopy/Surgery Location: [facility]/Surgeon: Dr. Lewis/Surgery Date: 4/29/24/)      Surgical Information  Surgery/Procedure: Upper EGD/endoscopic ultrasound  Surgery Location: [facility]  Surgeon: Dr. eLwis  Surgery Date: 4/29/24  Time of Surgery: 1:45 PM  Where patient plans to recover: At home with family  Fax number for surgical facility: Note does not need to be faxed, will be available electronically in Epic.    Assessment & Plan     The proposed surgical procedure is considered LOW risk.    Preoperative examination  - Patient is cleared for surgery. CBC is within normal limits. BMP shows slightly elevated creatinine, likely due to dehydration, will plan to recheck this in a few weeks for his medication recheck.   - VENOUS COLLECTION  - HEMOGRAM PLATELET DIFF (BFP)    Eosinophilic esophagitis    Adult hypertrophic pyloric stenosis    History of peptic ulcer disease    Prediabetes  - Stable.     Benign essential hypertension  - Well controlled, patient will follow up for medication recheck in a couple of weeks. Extension of Chorthalidone sent.   - VENOUS COLLECTION  - Basic Metabolic Panel (BFP)  - chlorthalidone (HYGROTON) 25 MG tablet    Antiplatelet or Anticoagulation Medication Instructions:  - Patient is not on any antiplatelet or anticoagulation medications.      Additional Medication Instructions:  - Patient was instructed to not have anything to eat or drink for 12 hours before the procedure other than small sips of water. He will take all of his medications the morning of the procedure.       RECOMMENDATION:  -APPROVAL GIVEN to proceed with proposed procedure.       Subjective     Lennox presents for a preoperative examination for an upcoming endoscopic ultrasound on 04/29/24 with Dr. Lewis at [facility]. He has a history of EOE and was also found to have pyloric stenosis and is having a further GI evaluation with an endoscopic ultrasound.     HPI related to upcoming procedure:   1. No - Have you ever had a heart attack or stroke?  2. No - Have you ever had surgery on your heart or blood vessels, such as a stent, coronary (heart) bypass, or surgery on an artery in the head, neck, heart, or legs?  3. No - Do you have chest pain when you are physically active?  4. No - Do you have a history of heart failure?  5. No - Do you currently have a cold, bronchitis, or symptoms of other respiratory (head and chest) infections?  6. No - Do you have a cough, shortness of breath, or wheezing?  7. No - Do you or anyone in your family have a history of blood clots?  8. No - Do you or anyone in your family have a serious bleeding problem, such as long-lasting bleeding after surgeries or cuts?  9. No - Have you ever had anemia or been told to take iron pills?  10. No - Have you had any abnormal blood loss such as black, tarry or bloody stools, or abnormal vaginal bleeding?  11. No - Have you ever had a blood transfusion?  12. Yes - Are you willing to have a blood transfusion if it is medically needed before, during, or after your surgery?  13. Yes - Have you or anyone in your family ever had problems with anesthesia (sedation for surgery)? Patient notes for recent surgeries he has trouble waking up after surgeries.   14. No - Do you have sleep apnea, excessive snoring, or daytime drowsiness?   15. No - Do you have any artifical heart valves or other implanted medical devices, such as a pacemaker, defibrillator, or continuous glucose monitor?  16. No - Do you have any artifical joints?  17. No - Are you allergic  to latex?    Health Care Directive  Patient does not have a Health Care Directive or Living Will: Discussed advance care planning with patient; however, patient declined at this time.    Preoperative Review of    reviewed - no record of controlled substances prescribed.    Status of Chronic Conditions:  See problem list for active medical problems.  Problems all longstanding and stable, except as noted/documented.  See ROS for pertinent symptoms related to these conditions.    Patient Active Problem List    Diagnosis Date Noted    Eosinophilic esophagitis 04/23/2024     Priority: Medium    Adult hypertrophic pyloric stenosis 04/23/2024     Priority: Medium    Prediabetes 04/23/2024     Priority: Medium    History of diverticulitis 12/18/2020     Priority: Medium    Gastroesophageal reflux disease with esophagitis without hemorrhage 12/18/2020     Priority: Medium    Health Care Home (V65.8) 07/16/2014     Priority: Medium    ACP (advance care planning) 07/16/2014     Priority: Medium     Advance Care Planning 8/24/2015: ACP Review and Resources Provided:  Reviewed chart for advance care plan.  Lennox GAONA Armand has no plan or code status on file. Discussed available resources and provided with information. Confirmed code status reflects current choices pending further ACP discussions.  Confirmed/documented legally designated decision maker(s). Added by Erica Ta              Internal derangement of knee 07/16/2014     Priority: Medium    History of gastric ulcer 04/15/2013     Priority: Medium    Conductive hearing loss in left ear 10/07/2011     Priority: Medium    Cholesteatoma 10/07/2011     Priority: Medium      Past Medical History:   Diagnosis Date    Gastroesophageal reflux disease     HL (hearing loss)     Hypertension      Past Surgical History:   Procedure Laterality Date    COLONOSCOPY  2010    every 5 years/ DR Daugherty    HAND SURGERY  2/2012    thumb    HERNIA REPAIR  1990's    left    LASER  DIODE STAPEDOTOMY Left 2015    Procedure: LASER DIODE STAPEDOTOMY;  Surgeon: Tasha Garcia MD;  Location: [facility] OR    STRESS TEST  age 48    WNL    TONSILLECTOMY  age 8    TYMPANOMASTOIDECTOMY      facial nerve tumors    TYMPANOMASTOIDECTOMY WITH FACIAL MONITORING  2011    Procedure:TYMPANOMASTOIDECTOMY WITH FACIAL MONITORING; Left Revision Tympanomastoidectomy with Partial  Ossicular Chain Reconstruction, using Facial Nerve Monitoring **latex safe room; Surgeon:TASHA GARCIA; Location:[facility] OR     Current Outpatient Medications   Medication Sig Dispense Refill    chlorthalidone (HYGROTON) 25 MG tablet Take 1 tablet (25 mg) by mouth daily 30 tablet 1    losartan (COZAAR) 50 MG tablet Take 1 tablet (50 mg) by mouth daily 90 tablet 0    omeprazole (PRILOSEC) 40 MG DR capsule Take 40 mg by mouth daily       Allergies   Allergen Reactions    Nkda [No Known Drug Allergy]       Social History     Tobacco Use    Smoking status: Never     Passive exposure: Never    Smokeless tobacco: Never   Substance Use Topics    Alcohol use: Yes     Comment: rarely     Family History   Problem Relation Age of Onset    Breast Cancer Mother 60         67    Hypertension Mother     Chronic Obstructive Pulmonary Disease Father 81    Hypertension Father     Hypertension Sister     Crohn's Disease Sister     Breast Cancer Sister     Hypertension Brother     Diabetes No family hx of     Prostate Cancer No family hx of      History   Drug Use No     Review of Systems  CONSTITUTIONAL: NEGATIVE for fever, chills, change in weight  INTEGUMENTARY/SKIN: NEGATIVE for worrisome rashes, moles or lesions  EYES: NEGATIVE for vision changes or irritation  ENT/MOUTH: NEGATIVE for ear, mouth and throat problems  RESP: NEGATIVE for significant cough or SOB  CV: NEGATIVE for chest pain, palpitations or peripheral edema  GI: NEGATIVE for nausea, abdominal pain, heartburn, or change in bowel habits  : NEGATIVE for frequency, dysuria,  "or hematuria  MUSCULOSKELETAL: NEGATIVE for significant arthralgias or myalgia  NEURO: NEGATIVE for weakness, dizziness or paresthesias  ENDOCRINE: NEGATIVE for temperature intolerance, skin/hair changes  HEME: NEGATIVE for bleeding problems  PSYCHIATRIC: NEGATIVE for changes in mood or affect    Objective    /78 (BP Location: Right arm, Patient Position: Sitting, Cuff Size: Adult Large)   Pulse 79   Temp 97.8  F (36.6  C) (Temporal)   Ht 1.702 m (5' 7\")   Wt 92.1 kg (203 lb)   SpO2 95%   BMI 31.79 kg/m     Estimated body mass index is 31.79 kg/m  as calculated from the following:    Height as of this encounter: 1.702 m (5' 7\").    Weight as of this encounter: 92.1 kg (203 lb).    Physical Exam  GENERAL: alert and no distress  EYES: Eyes grossly normal to inspection, PERRL and conjunctivae and sclerae normal  HENT: ear canals and TM's normal, nose and mouth without ulcers or lesions  NECK: no adenopathy, no asymmetry, masses, or scars  RESP: lungs clear to auscultation - no rales, rhonchi or wheezes  CV: regular rate and rhythm, normal S1 S2, no S3 or S4, no murmur, click or rub, no peripheral edema  ABDOMEN: soft, nontender, no hepatosplenomegaly, no masses and bowel sounds normal  MS: no gross musculoskeletal defects noted, no edema  SKIN: no suspicious lesions or rashes  NEURO: Normal strength and tone, mentation intact and speech normal  PSYCH: mentation appears normal, affect normal/bright    Recent Labs   Lab Test 02/27/24  1538 12/20/23  1208 12/20/23  0000   .3  --  139.9   POTASSIUM 4.52  --  4.57   CR 1.14  --  1.06   A1C  --  6.1  --       Diagnostics  Recent Results (from the past 24 hour(s))   HEMOGRAM PLATELET DIFF (BFP)    Collection Time: 04/23/24 12:00 AM   Result Value Ref Range    WBC 8.8 4.0 - 11 10*9/L    RBC Count 4.95 4.4 - 5.9 10*12/L    Hemoglobin 15.0 13.3 - 17.7 g/dL    Hematocrit 46.2 40.0 - 53.0 %    MCV 93.3 78 - 100 fL    MCH 30.3 26 - 33 pg    MCHC 32.5 31 - 36 g/dL "    Platelet Count 354 150 - 375 10^9/L    % Granulocytes 66.2 %    % Lymphocytes 24.0 %    % Monocytes 9.8 %   Basic Metabolic Panel (BFP)    Collection Time: 04/23/24  3:38 PM   Result Value Ref Range    Carbon Dioxide 25.1 20 - 32 mmol/L    Creatinine 1.47 (A) 0.60 - 1.30 mg/dL    Glucose 129 (A) 60 - 99 mg/dL    Sodium 136.3 135 - 146 mmol/L    Potassium 3.94 3.5 - 5.3 mmol/L    Chloride 104.5 98 - 110 mmol/L    Urea Nitrogen 30 (A) 7 - 25 mg/dL    Calcium 8.9 8.6 - 10.3 mg/dL    BUN/Creatinine Ratio 20.3 6 - 32    GFR Estimate 50 (A) >60 ml/min/1.73m2      No EKG required for low risk surgery (cataract, skin procedure, breast biopsy, etc).    Revised Cardiac Risk Index (RCRI)  The patient has the following serious cardiovascular risks for perioperative complications:   - No serious cardiac risks = 0 points     RCRI Interpretation: 0 points: Class I (very low risk - 0.4% complication rate)    Signed Electronically by: Livia Roth PA-C  Copy of this evaluation report is provided to requesting physician.

## 2024-04-23 NOTE — NURSING NOTE
Chief Complaint   Patient presents with    Pre-Op Exam     Surgery/Procedure: Upper EGD and colonoscopy  Surgery Location: [facility]  Surgeon: Dr. Lewis  Surgery Date: 4/29/24

## 2024-04-29 ENCOUNTER — ANESTHESIA (OUTPATIENT)
Dept: SURGERY | Facility: CLINIC | Age: 69
End: 2024-04-29
Payer: COMMERCIAL

## 2024-04-29 ENCOUNTER — HOSPITAL ENCOUNTER (OUTPATIENT)
Facility: CLINIC | Age: 69
Discharge: HOME | End: 2024-04-29
Attending: INTERNAL MEDICINE | Admitting: INTERNAL MEDICINE
Payer: COMMERCIAL

## 2024-04-29 ENCOUNTER — ANESTHESIA EVENT (OUTPATIENT)
Dept: SURGERY | Facility: CLINIC | Age: 69
End: 2024-04-29
Payer: COMMERCIAL

## 2024-04-29 VITALS
WEIGHT: 200.4 LBS | RESPIRATION RATE: 15 BRPM | DIASTOLIC BLOOD PRESSURE: 80 MMHG | BODY MASS INDEX: 31.39 KG/M2 | HEART RATE: 60 BPM | OXYGEN SATURATION: 96 % | SYSTOLIC BLOOD PRESSURE: 117 MMHG | TEMPERATURE: 97.1 F

## 2024-04-29 PROCEDURE — 250N000011 HC RX IP 250 OP 636: Performed by: NURSE ANESTHETIST, CERTIFIED REGISTERED

## 2024-04-29 PROCEDURE — 710N000012 HC RECOVERY PHASE 2, PER MINUTE: Performed by: INTERNAL MEDICINE

## 2024-04-29 PROCEDURE — 999N000141 HC STATISTIC PRE-PROCEDURE NURSING ASSESSMENT: Performed by: INTERNAL MEDICINE

## 2024-04-29 PROCEDURE — 258N000003 HC RX IP 258 OP 636: Performed by: NURSE ANESTHETIST, CERTIFIED REGISTERED

## 2024-04-29 PROCEDURE — 272N000001 HC OR GENERAL SUPPLY STERILE: Performed by: INTERNAL MEDICINE

## 2024-04-29 PROCEDURE — 370N000017 HC ANESTHESIA TECHNICAL FEE, PER MIN: Performed by: INTERNAL MEDICINE

## 2024-04-29 PROCEDURE — 360N000075 HC SURGERY LEVEL 2, PER MIN: Performed by: INTERNAL MEDICINE

## 2024-04-29 RX ORDER — ONDANSETRON 2 MG/ML
INJECTION INTRAMUSCULAR; INTRAVENOUS PRN
Status: DISCONTINUED | OUTPATIENT
Start: 2024-04-29 | End: 2024-04-29

## 2024-04-29 RX ORDER — SODIUM CHLORIDE, SODIUM LACTATE, POTASSIUM CHLORIDE, CALCIUM CHLORIDE 600; 310; 30; 20 MG/100ML; MG/100ML; MG/100ML; MG/100ML
INJECTION, SOLUTION INTRAVENOUS CONTINUOUS
Status: DISCONTINUED | OUTPATIENT
Start: 2024-04-29 | End: 2024-04-29 | Stop reason: HOSPADM

## 2024-04-29 RX ORDER — ONDANSETRON 4 MG/1
4 TABLET, ORALLY DISINTEGRATING ORAL EVERY 6 HOURS PRN
Status: CANCELLED | OUTPATIENT
Start: 2024-04-29

## 2024-04-29 RX ORDER — FENTANYL CITRATE 50 UG/ML
25 INJECTION, SOLUTION INTRAMUSCULAR; INTRAVENOUS EVERY 5 MIN PRN
Status: DISCONTINUED | OUTPATIENT
Start: 2024-04-29 | End: 2024-04-29 | Stop reason: HOSPADM

## 2024-04-29 RX ORDER — HYDROMORPHONE HCL IN WATER/PF 6 MG/30 ML
0.4 PATIENT CONTROLLED ANALGESIA SYRINGE INTRAVENOUS EVERY 5 MIN PRN
Status: DISCONTINUED | OUTPATIENT
Start: 2024-04-29 | End: 2024-04-29 | Stop reason: HOSPADM

## 2024-04-29 RX ORDER — PROPOFOL 10 MG/ML
INJECTION, EMULSION INTRAVENOUS CONTINUOUS PRN
Status: DISCONTINUED | OUTPATIENT
Start: 2024-04-29 | End: 2024-04-29

## 2024-04-29 RX ORDER — ONDANSETRON 2 MG/ML
4 INJECTION INTRAMUSCULAR; INTRAVENOUS EVERY 6 HOURS PRN
Status: CANCELLED | OUTPATIENT
Start: 2024-04-29

## 2024-04-29 RX ORDER — PROPOFOL 10 MG/ML
INJECTION, EMULSION INTRAVENOUS PRN
Status: DISCONTINUED | OUTPATIENT
Start: 2024-04-29 | End: 2024-04-29

## 2024-04-29 RX ORDER — FLUMAZENIL 0.1 MG/ML
0.2 INJECTION, SOLUTION INTRAVENOUS
Status: CANCELLED | OUTPATIENT
Start: 2024-04-29 | End: 2024-04-30

## 2024-04-29 RX ORDER — ONDANSETRON 4 MG/1
4 TABLET, ORALLY DISINTEGRATING ORAL EVERY 30 MIN PRN
Status: DISCONTINUED | OUTPATIENT
Start: 2024-04-29 | End: 2024-04-29 | Stop reason: HOSPADM

## 2024-04-29 RX ORDER — ONDANSETRON 2 MG/ML
4 INJECTION INTRAMUSCULAR; INTRAVENOUS EVERY 30 MIN PRN
Status: DISCONTINUED | OUTPATIENT
Start: 2024-04-29 | End: 2024-04-29 | Stop reason: HOSPADM

## 2024-04-29 RX ORDER — FENTANYL CITRATE 50 UG/ML
50 INJECTION, SOLUTION INTRAMUSCULAR; INTRAVENOUS EVERY 5 MIN PRN
Status: DISCONTINUED | OUTPATIENT
Start: 2024-04-29 | End: 2024-04-29 | Stop reason: HOSPADM

## 2024-04-29 RX ORDER — HYDROMORPHONE HCL IN WATER/PF 6 MG/30 ML
0.2 PATIENT CONTROLLED ANALGESIA SYRINGE INTRAVENOUS EVERY 5 MIN PRN
Status: DISCONTINUED | OUTPATIENT
Start: 2024-04-29 | End: 2024-04-29 | Stop reason: HOSPADM

## 2024-04-29 RX ORDER — NALOXONE HYDROCHLORIDE 0.4 MG/ML
0.1 INJECTION, SOLUTION INTRAMUSCULAR; INTRAVENOUS; SUBCUTANEOUS
Status: DISCONTINUED | OUTPATIENT
Start: 2024-04-29 | End: 2024-04-29 | Stop reason: HOSPADM

## 2024-04-29 RX ORDER — LIDOCAINE 40 MG/G
CREAM TOPICAL
Status: DISCONTINUED | OUTPATIENT
Start: 2024-04-29 | End: 2024-04-29 | Stop reason: HOSPADM

## 2024-04-29 RX ORDER — SODIUM CHLORIDE, SODIUM LACTATE, POTASSIUM CHLORIDE, CALCIUM CHLORIDE 600; 310; 30; 20 MG/100ML; MG/100ML; MG/100ML; MG/100ML
INJECTION, SOLUTION INTRAVENOUS CONTINUOUS PRN
Status: DISCONTINUED | OUTPATIENT
Start: 2024-04-29 | End: 2024-04-29

## 2024-04-29 RX ADMIN — ONDANSETRON 4 MG: 2 INJECTION INTRAMUSCULAR; INTRAVENOUS at 13:41

## 2024-04-29 RX ADMIN — SODIUM CHLORIDE, POTASSIUM CHLORIDE, SODIUM LACTATE AND CALCIUM CHLORIDE: 600; 310; 30; 20 INJECTION, SOLUTION INTRAVENOUS at 13:34

## 2024-04-29 RX ADMIN — PROPOFOL 150 MCG/KG/MIN: 10 INJECTION, EMULSION INTRAVENOUS at 13:41

## 2024-04-29 RX ADMIN — PROPOFOL 20 MG: 10 INJECTION, EMULSION INTRAVENOUS at 13:46

## 2024-04-29 ASSESSMENT — ACTIVITIES OF DAILY LIVING (ADL)
ADLS_ACUITY_SCORE: 33

## 2024-04-29 NOTE — ANESTHESIA POSTPROCEDURE EVALUATION
Patient: Lennox Acuna    Procedure: Procedure(s):  ENDOSCOPIC ULTRASOUND UPPER       Anesthesia Type:  MAC    Note:  Disposition: Outpatient   Postop Pain Control: Uneventful            Sign Out: Well controlled pain   PONV: No   Neuro/Psych: Uneventful            Sign Out: Acceptable/Baseline neuro status   Airway/Respiratory: Uneventful            Sign Out: Acceptable/Baseline resp. status   CV/Hemodynamics: Uneventful            Sign Out: Acceptable CV status; No obvious hypovolemia; No obvious fluid overload   Other NRE: NONE   DID A NON-ROUTINE EVENT OCCUR? No           Last vitals:  Vitals Value Taken Time   BP 90/60 04/29/24 1400   Temp 97.1  F (36.2  C) 04/29/24 1356   Pulse 73 04/29/24 1400   Resp 16 04/29/24 1400   SpO2 94 % 04/29/24 1404   Vitals shown include unfiled device data.    Electronically Signed By: Armand Narayanan MD  April 29, 2024  2:05 PM

## 2024-04-29 NOTE — ANESTHESIA CARE TRANSFER NOTE
Patient: Lennox Acuna    Procedure: Procedure(s):  ENDOSCOPIC ULTRASOUND UPPER       Diagnosis: Eosinophilic esophagitis [K20.0]  Pyloric stenosis [K31.1]  Diagnosis Additional Information: No value filed.    Anesthesia Type:   General     Note:    Oropharynx: oropharynx clear of all foreign objects and spontaneously breathing  Level of Consciousness: awake and drowsy  Oxygen Supplementation: room air    Independent Airway: airway patency satisfactory and stable  Dentition: dentition unchanged  Vital Signs Stable: post-procedure vital signs reviewed and stable  Report to RN Given: handoff report given  Patient transferred to: Phase II  Comments: Patients meets criteria for phase 2 recovery. VSS. Report to RN  Handoff Report: Identifed the Patient, Identified the Reponsible Provider, Reviewed the pertinent medical history, Discussed the surgical course, Reviewed Intra-OP anesthesia mangement and issues during anesthesia, Set expectations for post-procedure period and Allowed opportunity for questions and acknowledgement of understanding      Vitals:  Vitals Value Taken Time   BP     Temp     Pulse     Resp     SpO2         Electronically Signed By: CRIS Naidu CRNA  April 29, 2024  1:58 PM

## 2024-04-29 NOTE — DISCHARGE INSTRUCTIONS
DR. REBECCA PARMAR M.D.            CLINIC PHONE NUMBER:  [phone]  [facility]

## 2024-04-29 NOTE — PROGRESS NOTES
Patient and family chose to leave prior to report being finalized. Patient and spouse met with surgeon in person.

## 2024-04-29 NOTE — ANESTHESIA PREPROCEDURE EVALUATION
Anesthesia Pre-Procedure Evaluation    Patient: Lennox Acuna   MRN: [id number] : 1955        Procedure : Procedure(s):  ENDOSCOPIC ULTRASOUND UPPER          Past Medical History:   Diagnosis Date    Gastroesophageal reflux disease     HL (hearing loss)     Hypertension       Past Surgical History:   Procedure Laterality Date    COLONOSCOPY      every 5 years/ DR Daugherty    HAND SURGERY  2012    thumb    HERNIA REPAIR      left    LASER DIODE STAPEDOTOMY Left 2015    Procedure: LASER DIODE STAPEDOTOMY;  Surgeon: Tasha Carrillo MD;  Location: [facility] OR    STRESS TEST  age 48    WNL    TONSILLECTOMY  age 8    TYMPANOMASTOIDECTOMY      facial nerve tumors    TYMPANOMASTOIDECTOMY WITH FACIAL MONITORING  2011    Procedure:TYMPANOMASTOIDECTOMY WITH FACIAL MONITORING; Left Revision Tympanomastoidectomy with Partial  Ossicular Chain Reconstruction, using Facial Nerve Monitoring **latex safe room; Surgeon:TASHA CARRILLO; Location:[facility] OR      Allergies   Allergen Reactions    Nkda [No Known Drug Allergy]       Social History     Tobacco Use    Smoking status: Never     Passive exposure: Never    Smokeless tobacco: Never   Substance Use Topics    Alcohol use: Yes     Comment: rarely      Wt Readings from Last 1 Encounters:   24 90.9 kg (200 lb 6.4 oz)        Anesthesia Evaluation   Pt has had prior anesthetic. Type: General.    No history of anesthetic complications       ROS/MED HX  ENT/Pulmonary:  - neg pulmonary ROS     Neurologic:  - neg neurologic ROS     Cardiovascular:     (+)  hypertension- -   -  - -                                      METS/Exercise Tolerance:     Hematologic:  - neg hematologic  ROS     Musculoskeletal:  - neg musculoskeletal ROS     GI/Hepatic:     (+) GERD, Asymptomatic on medication,                  Renal/Genitourinary:  - neg Renal ROS     Endo:  - neg endo ROS     Psychiatric/Substance Use:  - neg psychiatric ROS     Infectious Disease:  - neg  "infectious disease ROS     Malignancy:       Other:            Physical Exam    Airway        Mallampati: II   TM distance: > 3 FB   Neck ROM: full   Mouth opening: > 3 cm    Respiratory Devices and Support         Dental       (+) Modest Abnormalities - crowns, retainers, 1 or 2 missing teeth      Cardiovascular   cardiovascular exam normal          Pulmonary   pulmonary exam normal                OUTSIDE LABS:  CBC:   Lab Results   Component Value Date    WBC 8.8 04/23/2024    WBC 9.9 11/30/2021    HGB 15.0 04/23/2024    HGB 16.6 11/30/2021    HCT 46.2 04/23/2024    HCT 50.5 11/30/2021     04/23/2024     11/30/2021     BMP:   Lab Results   Component Value Date    .3 04/23/2024    .3 02/27/2024    POTASSIUM 3.94 04/23/2024    POTASSIUM 4.52 02/27/2024    CHLORIDE 104.5 04/23/2024    CHLORIDE 108.4 02/27/2024    CO2 25.1 04/23/2024    CO2 25.2 02/27/2024    BUN 30 (A) 04/23/2024    BUN 20.3 04/23/2024    CR 1.47 (A) 04/23/2024    CR 1.14 02/27/2024     (A) 04/23/2024    GLC 93 02/27/2024     COAGS:   Lab Results   Component Value Date    INR 1.03 02/26/2018     POC: No results found for: \"BGM\", \"HCG\", \"HCGS\"  HEPATIC:   Lab Results   Component Value Date    ALBUMIN 3.9 12/20/2023    PROTTOTAL 7.4 12/20/2023    ALT 23 02/24/2018    AST 16 02/24/2018    ALKPHOS 105 12/20/2023    BILITOTAL 0.7 12/20/2023     OTHER:   Lab Results   Component Value Date    LACT 1.2 02/24/2018    A1C 6.1 12/20/2023    LEYDI 8.9 04/23/2024    LIPASE 154 02/24/2018       Anesthesia Plan    ASA Status:  2       Anesthesia Type: General.     - Airway: ETT   Induction: Intravenous.   Maintenance: Balanced.        Consents    Anesthesia Plan(s) and associated risks, benefits, and realistic alternatives discussed. Questions answered and patient/representative(s) expressed understanding.     - Discussed:     - Discussed with:  Patient      - Extended Intubation/Ventilatory Support Discussed: No.      - Patient is " "DNR/DNI Status: No     Use of blood products discussed: No .     Postoperative Care    Pain management: IV analgesics, Oral pain medications, Multi-modal analgesia.   PONV prophylaxis: Ondansetron (or other 5HT-3), Dexamethasone or Solumedrol     Comments:               Armand Narayanan MD    I have reviewed the pertinent notes and labs in the chart from the past 30 days and (re)examined the patient.  Any updates or changes from those notes are reflected in this note.              # Obesity: Estimated body mass index is 31.39 kg/m  as calculated from the following:    Height as of 4/23/24: 1.702 m (5' 7\").    Weight as of this encounter: 90.9 kg (200 lb 6.4 oz).      "

## 2024-05-03 ENCOUNTER — OFFICE VISIT (OUTPATIENT)
Dept: FAMILY MEDICINE | Facility: CLINIC | Age: 69
End: 2024-05-03

## 2024-05-03 VITALS
HEART RATE: 72 BPM | SYSTOLIC BLOOD PRESSURE: 120 MMHG | WEIGHT: 210 LBS | OXYGEN SATURATION: 96 % | DIASTOLIC BLOOD PRESSURE: 74 MMHG | TEMPERATURE: 98 F | RESPIRATION RATE: 18 BRPM | BODY MASS INDEX: 32.89 KG/M2

## 2024-05-03 DIAGNOSIS — S99.921A FOOT INJURY, RIGHT, INITIAL ENCOUNTER: ICD-10-CM

## 2024-05-03 DIAGNOSIS — R73.03 PREDIABETES: ICD-10-CM

## 2024-05-03 DIAGNOSIS — Z87.11 HISTORY OF GASTRIC ULCER: ICD-10-CM

## 2024-05-03 DIAGNOSIS — I10 BENIGN ESSENTIAL HYPERTENSION: Primary | ICD-10-CM

## 2024-05-03 LAB
BUN SERPL-MCNC: 27 MG/DL (ref 7–25)
BUN/CREATININE RATIO: 22.1 (ref 6–32)
CALCIUM SERPL-MCNC: 9.2 MG/DL (ref 8.6–10.3)
CHLORIDE SERPLBLD-SCNC: 104.4 MMOL/L (ref 98–110)
CO2 SERPL-SCNC: 25.5 MMOL/L (ref 20–32)
CREAT SERPL-MCNC: 1.22 MG/DL (ref 0.6–1.3)
GLUCOSE SERPL-MCNC: 142 MG/DL (ref 60–99)
HEMOGLOBIN A1C: 6.3 % (ref 4–5.6)
POTASSIUM SERPL-SCNC: 4.16 MMOL/L (ref 3.5–5.3)
SODIUM SERPL-SCNC: 137.7 MMOL/L (ref 135–146)

## 2024-05-03 PROCEDURE — 99214 OFFICE O/P EST MOD 30 MIN: CPT | Performed by: STUDENT IN AN ORGANIZED HEALTH CARE EDUCATION/TRAINING PROGRAM

## 2024-05-03 PROCEDURE — 80048 BASIC METABOLIC PNL TOTAL CA: CPT | Performed by: STUDENT IN AN ORGANIZED HEALTH CARE EDUCATION/TRAINING PROGRAM

## 2024-05-03 PROCEDURE — G2211 COMPLEX E/M VISIT ADD ON: HCPCS | Performed by: STUDENT IN AN ORGANIZED HEALTH CARE EDUCATION/TRAINING PROGRAM

## 2024-05-03 PROCEDURE — 83036 HEMOGLOBIN GLYCOSYLATED A1C: CPT | Performed by: STUDENT IN AN ORGANIZED HEALTH CARE EDUCATION/TRAINING PROGRAM

## 2024-05-03 PROCEDURE — 36415 COLL VENOUS BLD VENIPUNCTURE: CPT | Performed by: STUDENT IN AN ORGANIZED HEALTH CARE EDUCATION/TRAINING PROGRAM

## 2024-05-03 RX ORDER — CHLORTHALIDONE 25 MG/1
25 TABLET ORAL DAILY
Qty: 90 TABLET | Refills: 1 | Status: SHIPPED | OUTPATIENT
Start: 2024-05-03

## 2024-05-03 RX ORDER — LOSARTAN POTASSIUM 50 MG/1
50 TABLET ORAL DAILY
Qty: 90 TABLET | Refills: 1 | Status: SHIPPED | OUTPATIENT
Start: 2024-05-03

## 2024-05-03 NOTE — NURSING NOTE
Chief Complaint   Patient presents with    Recheck Medication     Non fasting medication check and review, needs BLOOD PRESSURE medications refilled today, did bring in home blood pressure monitor to check for accuracy, has been running ok at home      Pre-visit Screening:  Immunizations:  up to date  Colonoscopy:  is up to date  Mammogram: na  Asthma Action Test/Plan:  na  PHQ9:  na  GAD7:  na  Questioned patient about current smoking habits Pt. has never smoked.  Ok to leave detailed message on voice mail for today's visit only yes, phone # [phone] (home)

## 2024-05-03 NOTE — PATIENT INSTRUCTIONS
Blood work today    No change to medications today    Continue to focus on hydration. If still having lightheaded episodes we can decrease chlorthalidone to 1/2 pill/day and monitor to see if that is adequate for BP control.     Can use oxycodone sparingly for severe pain - be careful of worsening lightheadedness. Follow-up with [facility] as planned     Follow-up in 6 months, sooner if concerns

## 2024-05-03 NOTE — PROGRESS NOTES
Assessment & Plan     1. Benign essential hypertension  Well controlled, some lightheadedness but improved with hydration so agreed on plan below   - chlorthalidone (HYGROTON) 25 MG tablet; Take 1 tablet (25 mg) by mouth daily  Dispense: 90 tablet; Refill: 1  - losartan (COZAAR) 50 MG tablet; Take 1 tablet (50 mg) by mouth daily  Dispense: 90 tablet; Refill: 1  - Basic Metabolic Panel (BFP)    2. Foot injury, right, initial encounter  3. History of gastric ulcer  Reviewed pain management plan as below, avoid NSAIDs, follow-up with ortho as planned    4. Prediabetes  Recheck labs, reviewed diet/lifestyle recs  - HEMOGLOBIN A1C (BFP)     Patient Instructions   Blood work today    No change to medications today    Continue to focus on hydration. If still having lightheaded episodes we can decrease chlorthalidone to 1/2 pill/day and monitor to see if that is adequate for BP control.     Can use oxycodone sparingly for severe pain - be careful of worsening lightheadedness. Follow-up with [facility] as planned     Follow-up in 6 months, sooner if concerns       Reasons to follow-up sooner or seek emergent care reviewed.       Nilo Long MD, [facility]      Subjective     Lennox Acuna is a 69 year old male who presents to clinic today for the following health issues:    HPI   Chief Complaint   Patient presents with    Recheck Medication     Non fasting medication check and review, needs BLOOD PRESSURE medications refilled today, did bring in home blood pressure monitor to check for accuracy, has been running ok at home       Endoscopy last week went well per patient     Hypertension Follow-up  Started ARB 12/2023, thiazide 2/2024. Has had some lightheadedness if he doesn't drink enough fluids but has focused on this more and has nearly resolved.   Do you check your blood pressure regularly outside of the clinic? occas   Are your blood pressures ever more than 140 on the top number (systolic) OR  more   than 90 on the bottom number (diastolic), for example 140/90? Not recently     Prediabetes: trying to improve diet.   Wt Readings from Last 5 Encounters:   05/03/24 95.3 kg (210 lb)   04/29/24 90.9 kg (200 lb 6.4 oz)   04/23/24 92.1 kg (203 lb)   02/27/24 90.3 kg (199 lb)   12/20/23 89.8 kg (198 lb)     R foot injury: seen at [facility], has follow-up with Dr. Jonny maldonado. Advised to take NSAIDs prn but has hx of ulcer. Wondering about pain mgmt. Has previous rx for oxycodone at home, wondering if appropriate.         Objective    /74 (BP Location: Right arm, Patient Position: Sitting, Cuff Size: Adult Large)   Pulse 72   Temp 98  F (36.7  C) (Temporal)   Resp 18   Wt 95.3 kg (210 lb)   SpO2 96%   BMI 32.89 kg/m    Body mass index is 32.89 kg/m .  Alert, NAD  NC/AT  Sclerae anicteric  RRR  Resp nonlabored  Skin warm and dry  No focal neuro deficits. Speech intact.   CAM boot RLEL  Appropriate affect     Labs reviewed.

## 2024-05-06 LAB — UPPER EUS: NORMAL

## 2024-05-10 ENCOUNTER — TRANSFERRED RECORDS (OUTPATIENT)
Dept: FAMILY MEDICINE | Facility: CLINIC | Age: 69
End: 2024-05-10

## 2024-10-07 DIAGNOSIS — I10 BENIGN ESSENTIAL HYPERTENSION: ICD-10-CM

## 2024-10-08 RX ORDER — CHLORTHALIDONE 25 MG/1
25 TABLET ORAL DAILY
COMMUNITY
Start: 2024-10-08

## 2024-10-08 NOTE — TELEPHONE ENCOUNTER
Lennox Acuna is requesting a refill of:    Refused Prescriptions:                       Disp   Refills    chlorthalidone (HYGROTON) 25 MG tablet [Ph*                Sig: TAKE ONE TABLET BY MOUTH EVERY DAY  Refused By: LYN REAL  Reason for Refusal: Patient has requested refill too soon    Refill sent on 05/03/24 for 90 tab with 1 refill, enough medication until 11/2024

## 2024-10-14 RX ORDER — CHLORTHALIDONE 25 MG/1
25 TABLET ORAL DAILY
Qty: 30 TABLET | Refills: 0 | Status: SHIPPED | OUTPATIENT
Start: 2024-10-14 | End: 2024-11-08

## 2024-10-14 NOTE — TELEPHONE ENCOUNTER
Pt called stating he spilled his bottle of chlorthalidone and is now out. Pt due for OV next month. Can you send 1 month for SRB?    Lennox GAONA Armand is requesting a refill of:    Pending Prescriptions:                       Disp   Refills    chlorthalidone (HYGROTON) 25 MG tablet    30 tab*0            Sig: Take 1 tablet (25 mg) by mouth daily.  Refused Prescriptions:                       Disp   Refills    chlorthalidone (HYGROTON) 25 MG tablet [Ph*                Sig: TAKE ONE TABLET BY MOUTH EVERY DAY  Refused By: LYN REAL  Reason for Refusal: Patient has requested refill too soon

## 2024-11-08 ENCOUNTER — OFFICE VISIT (OUTPATIENT)
Dept: FAMILY MEDICINE | Facility: CLINIC | Age: 69
End: 2024-11-08

## 2024-11-08 VITALS
OXYGEN SATURATION: 95 % | HEART RATE: 67 BPM | RESPIRATION RATE: 18 BRPM | WEIGHT: 203 LBS | BODY MASS INDEX: 31.79 KG/M2 | SYSTOLIC BLOOD PRESSURE: 120 MMHG | DIASTOLIC BLOOD PRESSURE: 84 MMHG

## 2024-11-08 DIAGNOSIS — Z13.220 SCREENING FOR LIPID DISORDERS: ICD-10-CM

## 2024-11-08 DIAGNOSIS — Z87.11 HISTORY OF PEPTIC ULCER DISEASE: Primary | ICD-10-CM

## 2024-11-08 DIAGNOSIS — I10 BENIGN ESSENTIAL HYPERTENSION: ICD-10-CM

## 2024-11-08 PROCEDURE — 99213 OFFICE O/P EST LOW 20 MIN: CPT | Performed by: PHYSICIAN ASSISTANT

## 2024-11-08 PROCEDURE — 80053 COMPREHEN METABOLIC PANEL: CPT | Performed by: PHYSICIAN ASSISTANT

## 2024-11-08 PROCEDURE — 36415 COLL VENOUS BLD VENIPUNCTURE: CPT | Performed by: PHYSICIAN ASSISTANT

## 2024-11-08 PROCEDURE — 80061 LIPID PANEL: CPT | Performed by: PHYSICIAN ASSISTANT

## 2024-11-08 RX ORDER — CHLORTHALIDONE 25 MG/1
25 TABLET ORAL DAILY
Qty: 90 TABLET | Refills: 3 | Status: SHIPPED | OUTPATIENT
Start: 2024-11-08

## 2024-11-08 RX ORDER — OMEPRAZOLE 40 MG/1
40 CAPSULE, DELAYED RELEASE ORAL DAILY
Qty: 90 CAPSULE | Refills: 3 | Status: SHIPPED | OUTPATIENT
Start: 2024-11-08

## 2024-11-08 RX ORDER — LOSARTAN POTASSIUM 50 MG/1
50 TABLET ORAL DAILY
Qty: 90 TABLET | Refills: 3 | Status: SHIPPED | OUTPATIENT
Start: 2024-11-08

## 2024-11-08 NOTE — PROGRESS NOTES
CC: Medication Check    History:  HTN:  Was last here to discuss 5/2024. Was taking chlorthalidone and losartan daily, and was having lightheadedness. Agreed to work on hydration and then decrease medication if not improving, but this resolved with decreasing caffeine/soda, and increase water. Checks bp at home and getting 120/80. No chest pain, palpitations, SOB.    GERD:  Takes omeprazole 40 mg daily. Takes right away in morning 30 minutes before breakfast. Does have history of PUD.    PMH, MEDICATIONS, ALLERGIES, SOCIAL AND FAMILY HISTORY in [facility] and reviewed by me personally.    ROS negative other than the symptoms noted above in the HPI.      Examination   /84 (BP Location: Right arm, Patient Position: Sitting, Cuff Size: Adult Large)   Pulse 67   Resp 18   Wt 92.1 kg (203 lb)   SpO2 95%   BMI 31.79 kg/m       Constitutional: Sitting comfortably, in no acute distress. Vital signs noted  Neck:  no adenopathy, trachea midline and normal to palpation, thyroid normal to palpation  Cardiovascular:  regular rate and rhythm, no murmurs, clicks, or gallops  Respiratory:  normal respiratory rate and rhythm, lungs clear to auscultation  Psychiatric: mentation appears normal and affect normal/bright      A/P    ICD-10-CM    1. History of peptic ulcer disease  Z87.11 omeprazole (PRILOSEC) 40 MG DR capsule      2. Benign essential hypertension  I10 chlorthalidone (HYGROTON) 25 MG tablet     losartan (COZAAR) 50 MG tablet     VENOUS COLLECTION     Comprehensive Metobolic Panel ([facility])      3. Screening for lipid disorders  Z13.220 VENOUS COLLECTION     Lipid Panel ([facility])          DISCUSSION:   HTN:  Reassured by BP controlled today and at home. Will update fasting labs and send MyChart. Will refill medication without change for 1 year.     GERD:  Will refill medication without change for 1 year.     follow up visit: 1 year    Maria Luisa Aguila PA-C  [facility]

## 2024-11-08 NOTE — NURSING NOTE
Chief Complaint   Patient presents with    Recheck Medication     Fasting medication check and review, needs refills of blood pressure medications      Pre-visit Screening:  Immunizations:  up to date  Colonoscopy:  is up to date  Mammogram: rigoberto  Asthma Action Test/Plan:  na  PHQ9:  na  GAD7:  na  Questioned patient about current smoking habits Pt. has never smoked.  Ok to leave detailed message on voice mail for today's visit only yes, phone # [phone] (home)

## 2024-11-11 LAB
ALBUMIN SERPL-MCNC: 4 G/DL (ref 3.6–5.1)
ALP SERPL-CCNC: 120 U/L (ref 33–130)
ALT 1742-6: 11 U/L (ref 0–32)
AST 1920-8: 13 U/L (ref 0–35)
BILIRUB SERPL-MCNC: 0.6 MG/DL (ref 0.2–1.2)
BUN SERPL-MCNC: 28 MG/DL (ref 7–25)
BUN/CREATININE RATIO: 22 (ref 6–32)
CALCIUM SERPL-MCNC: 9.3 MG/DL (ref 8.6–10.3)
CHLORIDE SERPLBLD-SCNC: 105.6 MMOL/L (ref 98–110)
CHOLEST SERPL-MCNC: 180 MG/DL (ref 0–199)
CHOLEST/HDLC SERPL: 3 {RATIO} (ref 0–5)
CO2 SERPL-SCNC: 24.9 MMOL/L (ref 20–32)
CREAT SERPL-MCNC: 1.29 MG/DL (ref 0.6–1.3)
GLUCOSE SERPL-MCNC: 87 MG/DL (ref 60–99)
HDLC SERPL-MCNC: 60 MG/DL (ref 40–150)
LDLC SERPL CALC-MCNC: 109 MG/DL (ref 0–129)
POTASSIUM SERPL-SCNC: 4.05 MMOL/L (ref 3.5–5.3)
PROT SERPL-MCNC: 7.5 G/DL (ref 6.1–8.1)
SODIUM SERPL-SCNC: 140.4 MMOL/L (ref 135–146)
TRIGL SERPL-MCNC: 56 MG/DL (ref 0–149)

## 2025-01-26 ENCOUNTER — HEALTH MAINTENANCE LETTER (OUTPATIENT)
Age: 70
End: 2025-01-26

## 2025-06-18 ENCOUNTER — OFFICE VISIT (OUTPATIENT)
Dept: FAMILY MEDICINE | Facility: CLINIC | Age: 70
End: 2025-06-18

## 2025-06-18 VITALS
HEART RATE: 85 BPM | BODY MASS INDEX: 34.02 KG/M2 | SYSTOLIC BLOOD PRESSURE: 110 MMHG | DIASTOLIC BLOOD PRESSURE: 70 MMHG | WEIGHT: 217.2 LBS | OXYGEN SATURATION: 98 %

## 2025-06-18 DIAGNOSIS — S01.81XA FACIAL LACERATION, INITIAL ENCOUNTER: Primary | ICD-10-CM

## 2025-06-18 PROCEDURE — 99213 OFFICE O/P EST LOW 20 MIN: CPT | Performed by: PHYSICIAN ASSISTANT
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
## 2025-08-19 DIAGNOSIS — Z87.11 HISTORY OF PEPTIC ULCER DISEASE: ICD-10-CM

## 2025-08-19 RX ORDER — OMEPRAZOLE 40 MG/1
40 CAPSULE, DELAYED RELEASE ORAL DAILY
COMMUNITY
Start: 2025-08-19

## (undated) DEVICE — LINEN TOWEL PACK X5 5464

## (undated) DEVICE — TUBING SUCTION MEDI-VAC SOFT 3/16"X20' N520A

## (undated) DEVICE — COVER FOOTSWITCH URO

## (undated) DEVICE — ENDO TRAP POLYP QUICK CATCH 710201

## (undated) DEVICE — KIT ENDO FIRST STEP DISINFECTANT 200ML W/POUCH EP-4

## (undated) DEVICE — PREP PAD ALCOHOL 6818

## (undated) DEVICE — SOL WATER IRRIG 500ML BOTTLE 2F7113

## (undated) DEVICE — SOL WATER IRRIG 1000ML BOTTLE 2F7114

## (undated) DEVICE — KIT ENDO TURNOVER/PROCEDURE W/CLEAN A SCOPE LINERS 103888

## (undated) DEVICE — GOWN LG DISP 9515

## (undated) DEVICE — GOWN XLG DISP 9545

## (undated) DEVICE — APPLICATORS COTTON TIP 6"X2 STERILE LF C15053-006

## (undated) DEVICE — BAG CLEAR TRASH 1.3M 39X33" P4040C

## (undated) DEVICE — ENDO SNARE POLYPECTOMY OVAL 15MM LOOP SD-240U-15

## (undated) DEVICE — KIT PROCEDURE W/CLEAN-A-SCOPE LINERS V2 200800

## (undated) DEVICE — ENDO PROBE COVER ULTRASOUND BALLOON LATEX  MAJ-249

## (undated) DEVICE — SUCTION CANISTER MEDIVAC LINER 3000ML W/LID 65651-530

## (undated) RX ORDER — FENTANYL CITRATE 50 UG/ML
INJECTION, SOLUTION INTRAMUSCULAR; INTRAVENOUS
Status: DISPENSED
Start: 2018-05-14